# Patient Record
Sex: MALE | Race: WHITE | Employment: OTHER | URBAN - NONMETROPOLITAN AREA
[De-identification: names, ages, dates, MRNs, and addresses within clinical notes are randomized per-mention and may not be internally consistent; named-entity substitution may affect disease eponyms.]

---

## 2017-01-20 DIAGNOSIS — R07.89 ATYPICAL CHEST PAIN: Primary | ICD-10-CM

## 2017-01-20 NOTE — TELEPHONE ENCOUNTER
Prilosec      Last Written Prescription Date: 7/29/16  Last Fill Quantity: 30,  # refills: 10   Last Office Visit with [facility], [facility] or [facility] prescribing provider: 6/10/16

## 2017-01-23 RX ORDER — OMEPRAZOLE 40 MG/1
40 CAPSULE, DELAYED RELEASE ORAL DAILY
Qty: 30 CAPSULE | Refills: 3 | Status: SHIPPED | OUTPATIENT
Start: 2017-01-23 | End: 2018-07-17

## 2017-07-11 DIAGNOSIS — J30.2 SEASONAL ALLERGIC RHINITIS: ICD-10-CM

## 2017-07-12 RX ORDER — AZELASTINE 1 MG/ML
SPRAY, METERED NASAL
Qty: 30 ML | Refills: 0 | Status: SHIPPED | OUTPATIENT
Start: 2017-07-12 | End: 2017-08-24

## 2017-08-24 DIAGNOSIS — J30.2 SEASONAL ALLERGIC RHINITIS: ICD-10-CM

## 2017-08-24 RX ORDER — AZELASTINE 1 MG/ML
SPRAY, METERED NASAL
Qty: 30 ML | Refills: 0 | Status: SHIPPED | OUTPATIENT
Start: 2017-08-24 | End: 2017-12-12

## 2017-10-04 DIAGNOSIS — J30.2 SEASONAL ALLERGIC RHINITIS: ICD-10-CM

## 2017-10-04 NOTE — LETTER
[facility]  [address] [place] [address]              Atul Coker  [address] [facility] [address]      October 5, 2017       Dear Atul Coker,    APPOINTMENT REMINDER:       Our record indicates that it is time for you to be seen for an office visit with .  You may call our office at [phone] to schedule an appointment.  Please disregard this notice if you have already made an appointment.      Sincerely,    MD Claude

## 2017-10-04 NOTE — TELEPHONE ENCOUNTER
Astelin      Last Written Prescription Date: 8/24/17  Last Fill Quantity: 30ml,  # refills: 0   Last Office Visit with [facility], P or [facility] prescribing provider: 6/10/16

## 2017-10-05 RX ORDER — AZELASTINE 1 MG/ML
SPRAY, METERED NASAL
Qty: 30 ML | Refills: 0 | Status: SHIPPED | OUTPATIENT
Start: 2017-10-05 | End: 2018-02-02

## 2017-11-13 DIAGNOSIS — J30.2 SEASONAL ALLERGIC RHINITIS: ICD-10-CM

## 2017-11-13 DIAGNOSIS — J30.2 SEASONAL ALLERGIC RHINITIS, UNSPECIFIED CHRONICITY, UNSPECIFIED TRIGGER: Primary | ICD-10-CM

## 2017-11-14 NOTE — TELEPHONE ENCOUNTER
Astelin      Last Written Prescription Date: 10/5/17  Last Fill Quantity: 30ml,  # refills: 0   Last Office Visit with G, P or [facility] prescribing provider: 6/10/16

## 2017-11-16 RX ORDER — AZELASTINE 1 MG/ML
SPRAY, METERED NASAL
Qty: 30 ML | Refills: 0 | Status: SHIPPED | OUTPATIENT
Start: 2017-11-16 | End: 2018-02-02

## 2017-11-16 NOTE — TELEPHONE ENCOUNTER
Patient due for office visit.  Please see note below.  Medication pended.  Please advise.  Thank you.

## 2017-11-16 NOTE — TELEPHONE ENCOUNTER
Left message for the patient to return phone call back to clinic at earliest convenience.  Leti Bynum CMA([facility])

## 2017-11-22 NOTE — TELEPHONE ENCOUNTER
Left message for the patient to return phone call back to clinic at earliest convenience.  Leti Bynum CMA([facility])

## 2017-12-12 ENCOUNTER — OFFICE VISIT (OUTPATIENT)
Dept: FAMILY MEDICINE | Facility: OTHER | Age: 62
End: 2017-12-12
Attending: FAMILY MEDICINE
Payer: COMMERCIAL

## 2017-12-12 VITALS
HEIGHT: 69 IN | TEMPERATURE: 97 F | HEART RATE: 71 BPM | DIASTOLIC BLOOD PRESSURE: 68 MMHG | SYSTOLIC BLOOD PRESSURE: 132 MMHG | OXYGEN SATURATION: 97 % | BODY MASS INDEX: 28.82 KG/M2 | WEIGHT: 194.6 LBS | RESPIRATION RATE: 18 BRPM

## 2017-12-12 DIAGNOSIS — J30.1 CHRONIC SEASONAL ALLERGIC RHINITIS DUE TO POLLEN: Primary | ICD-10-CM

## 2017-12-12 PROCEDURE — 99213 OFFICE O/P EST LOW 20 MIN: CPT | Performed by: FAMILY MEDICINE

## 2017-12-12 RX ORDER — AZELASTINE 1 MG/ML
SPRAY, METERED NASAL
Qty: 3 BOTTLE | Refills: 3 | Status: SHIPPED | OUTPATIENT
Start: 2017-12-12 | End: 2018-05-17

## 2017-12-12 ASSESSMENT — PATIENT HEALTH QUESTIONNAIRE - PHQ9
5. POOR APPETITE OR OVEREATING: NOT AT ALL
SUM OF ALL RESPONSES TO PHQ QUESTIONS 1-9: 0

## 2017-12-12 ASSESSMENT — ANXIETY QUESTIONNAIRES
5. BEING SO RESTLESS THAT IT IS HARD TO SIT STILL: NOT AT ALL
3. WORRYING TOO MUCH ABOUT DIFFERENT THINGS: NOT AT ALL
1. FEELING NERVOUS, ANXIOUS, OR ON EDGE: NOT AT ALL
GAD7 TOTAL SCORE: 0
IF YOU CHECKED OFF ANY PROBLEMS ON THIS QUESTIONNAIRE, HOW DIFFICULT HAVE THESE PROBLEMS MADE IT FOR YOU TO DO YOUR WORK, TAKE CARE OF THINGS AT HOME, OR GET ALONG WITH OTHER PEOPLE: NOT DIFFICULT AT ALL
2. NOT BEING ABLE TO STOP OR CONTROL WORRYING: NOT AT ALL
7. FEELING AFRAID AS IF SOMETHING AWFUL MIGHT HAPPEN: NOT AT ALL
6. BECOMING EASILY ANNOYED OR IRRITABLE: NOT AT ALL

## 2017-12-12 ASSESSMENT — PAIN SCALES - GENERAL: PAINLEVEL: NO PAIN (0)

## 2017-12-12 NOTE — PROGRESS NOTES
SUBJECTIVE:  Atul Coker, 62 year old, male is seen with the following medical problems.    Allergic rhinitis.  Eliazar has history of allergic rhinitis and has been on azelastine as well as fexofenadine.  He has required occasional mucinex for congestions.  No new issues.      Denies chest pain, dyspnea, decreased exercise tolerance, dizzyness, nausea, vomiting, diaphoresis, palpitations, numbness, tingling, or paresthesias.    Current Outpatient Prescriptions   Medication Sig Dispense Refill     azelastine (ASTELIN) 0.1 % spray Spray 2 sprays into both nostrils 2 times daily 30 mL 0     azelastine (ASTELIN) 0.1 % spray Spray 2 sprays into both nostrils 2 times daily 30 mL 0     azelastine (ASTELIN) 0.1 % spray Spray 2 sprays into both nostrils 2 times daily 30 mL 0     omeprazole (PRILOSEC) 40 MG capsule Take 1 capsule (40 mg) by mouth daily Take 30-60 minutes before a meal. 30 capsule 3     order for DME Equipment being ordered: Orthotics 1 Device 0     order for DME Equipment being ordered: Left stirrup brace        DX:M25.572 LEFT ANKLE PAIN 1 Units 1     ASPIRIN PO Take 81 mg by mouth daily        Famotidine (PEPCID AC MAXIMUM STRENGTH PO) Take 20 mg by mouth daily       guaiFENesin (MUCINEX) 600 MG 12 hr tablet Take 600 mg by mouth daily       multivitamin, therapeutic with minerals (MULTI-VITAMIN) TABS Take 1 tablet by mouth daily       fexofenadine (ALLEGRA ALLERGY) 180 MG tablet Take 1 tablet by mouth daily.       Milk Thistle 175 MG CAPS Take by mouth daily          Allergies   Allergen Reactions     Seasonal Allergies      Penicillins Rash       Past Medical History:   Diagnosis Date     Allergic rhinitis, cause unspecified 3/5/2013    seasonal     CMV (cytomegalovirus infection) status positive (H)      Other abnormal glucose 3/5/2013    prediabetes     Other nonspecific abnormal serum enzyme levels 3/5/2013     Pure hypercholesterolemia 1/24/2012     Vocal cord nodules      Past Surgical History:  "  Procedure Laterality Date     COLONOSCOPY  08-    repeat in 10 yrs     HERNIA REPAIR, INGUINAL RT/LT  2013    Right side     UPPER GI ENDOSCOPY       VASECTOMY  2013     Family History   Problem Relation Age of Onset     C.A.D. Father      Myocardial Infarction Father      myocardial infarction     C.A.D. Mother      Other - See Comments Mother      colon polyps     Hypertension Mother      Other - See Comments Brother      end stage liver disease - cause of death     Hypertension Brother      Hypertension Sister      Social History     Social History     Marital status:      Spouse name: N/A     Number of children: N/A     Years of education: N/A     Occupational History     Teacher      full time     Social History Main Topics     Smoking status: Never Smoker     Smokeless tobacco: Never Used     Alcohol use Yes      Comment: occasionally     Drug use: No     Sexual activity: Not on file     Other Topics Concern     Caffeine Concern Yes     2 cups coffee daily     Parent/Sibling W/ Cabg, Mi Or Angioplasty Before 65f 55m? Yes     father     Social History Narrative         Review Of Systems  Constitutional, HEENT, cardiovascular, pulmonary, gi and gu systems are negative, except as otherwise noted.      OBJECTIVE:/68 (BP Location: Left arm, Patient Position: Sitting, Cuff Size: Adult Regular)  Pulse 71  Temp 97  F (36.1  C) (Tympanic)  Resp 18  Ht 5' 9\" (1.753 m)  Wt 194 lb 9.6 oz (88.3 kg)  SpO2 97%  BMI 28.74 kg/m2      Exam:  Physical Exam   Constitutional: He is oriented to person, place, and time. He appears well-developed and well-nourished. No distress.   Neurological: He is alert and oriented to person, place, and time.   Psychiatric: He has a normal mood and affect.     Other exam not repeated    Labs:  Orders Only on 07/26/2013   Component Date Value Ref Range Status     Post Vas Analysis 07/26/2013   NOSPRM Final                    Value:No Sperm Seen                          "  Testing performed on concentrated specimen.       ASSESSMENT/PLAN:  Chronic seasonal allergic rhinitis due to pollen  Continue same medications as outlined.  Follow up for complete physical.  - azelastine (ASTELIN) 0.1 % spray; Spray 2 sprays into both nostrils 2 times daily                Jatin Carbajal MD

## 2017-12-12 NOTE — MR AVS SNAPSHOT
After Visit Summary   12/12/2017    Atul Coker    MRN: [id number]           Patient Information     Date Of Birth          1955        Visit Information        Provider Department      12/12/2017 3:40 PM Jatin Carbajal MD [facility]3]        Today's Diagnoses     Chronic seasonal allergic rhinitis due to pollen    -  1      Care Instructions    Continue same medications.            Follow-ups after your visit        Follow-up notes from your care team     Return if symptoms worsen or fail to improve.      Who to contact     If you have questions or need follow up information about today's clinic visit or your schedule please contact [facility] directly at [phone].  Normal or non-critical lab and imaging results will be communicated to you by MyChart, letter or phone within 4 business days after the clinic has received the results. If you do not hear from us within 7 days, please contact the clinic through [employer] or phone. If you have a critical or abnormal lab result, we will notify you by phone as soon as possible.  Submit refill requests through [employer] or call your pharmacy and they will forward the refill request to us. Please allow 3 business days for your refill to be completed.          Additional Information About Your Visit        MyChart Information     [employer] gives you secure access to your electronic health record. If you see a primary care provider, you can also send messages to your care team and make appointments. If you have questions, please call your primary care clinic.  If you do not have a primary care provider, please call [phone] and they will assist you.        Care EveryWhere ID     This is your Care EveryWhere ID. This could be used by other organizations to access your [facility] medical records  [id number]        Your Vitals Were     Pulse Temperature Respirations Height Pulse Oximetry BMI (Body Mass Index)    71 97  F  "(36.1  C) (Tympanic) 18 5' 9\" (1.753 m) 97% 28.74 kg/m2       Blood Pressure from Last 3 Encounters:   12/12/17 132/68   06/10/16 103/70   10/05/15 132/74    Weight from Last 3 Encounters:   12/12/17 194 lb 9.6 oz (88.3 kg)   06/10/16 197 lb (89.4 kg)   10/05/15 192 lb (87.1 kg)              Today, you had the following     No orders found for display         Today's Medication Changes          These changes are accurate as of: 12/12/17 11:59 PM.  If you have any questions, ask your nurse or doctor.               These medicines have changed or have updated prescriptions.        Dose/Directions    * azelastine 0.1 % spray   Commonly known as:  ASTELIN   This may have changed:  Another medication with the same name was changed. Make sure you understand how and when to take each.   Used for:  Seasonal allergic rhinitis   Changed by:  Jatin Carbajal MD        Spray 2 sprays into both nostrils 2 times daily   Quantity:  30 mL   Refills:  0       * azelastine 0.1 % spray   Commonly known as:  ASTELIN   This may have changed:  Another medication with the same name was changed. Make sure you understand how and when to take each.   Used for:  Seasonal allergic rhinitis, unspecified chronicity, unspecified trigger   Changed by:  Jatin Carbajal MD        Spray 2 sprays into both nostrils 2 times daily   Quantity:  30 mL   Refills:  0       * azelastine 0.1 % spray   Commonly known as:  ASTELIN   This may have changed:  See the new instructions.   Used for:  Chronic seasonal allergic rhinitis due to pollen   Changed by:  Jatin Carbajal MD        Spray 2 sprays into both nostrils 2 times daily   Quantity:  3 Bottle   Refills:  3       * Notice:  This list has 3 medication(s) that are the same as other medications prescribed for you. Read the directions carefully, and ask your doctor or other care provider to review them with you.         Where to get your medicines      These medications were sent to [facility] - " [place], [place] - [address]  [address], [place] [address]     Phone:  [phone]     azelastine 0.1 % spray                Primary Care Provider Office Phone # Fax #    Jatin Carbajal [phone]       [facility] [place] [facility] [address] [place] [address]        Equal Access to Services     [facility] : Hadii aad ku hadasho Soomaali, waaxda luqadaha, qaybta kaalmada adeegyada, waxay idiin hayaan adeeg kharash la'aan ah. So [facility] [phone].    ATENCIÓN: Si habla espjustus, tiene a oscar disposición servicios gratuitos de asistencia lingüística. Isabel al [phone].    We comply with applicable federal civil rights laws and [place] laws. We do not discriminate on the basis of race, color, national origin, age, disability, sex, sexual orientation, or gender identity.            Thank you!     Thank you for choosing [facility]  for your care. Our goal is always to provide you with excellent care. Hearing back from our patients is one way we can continue to improve our services. Please take a few minutes to complete the written survey that you may receive in the mail after your visit with us. Thank you!             Your Updated Medication List - Protect others around you: Learn how to safely use, store and throw away your medicines at www.disposemymeds.org.          This list is accurate as of: 12/12/17 11:59 PM.  Always use your most recent med list.                   Brand Name Dispense Instructions for use Diagnosis    ALLEGRA ALLERGY 180 MG tablet   Generic drug:  fexofenadine      Take 1 tablet by mouth daily.        ASPIRIN PO      Take 81 mg by mouth daily        * azelastine 0.1 % spray    ASTELIN    30 mL    Spray 2 sprays into both nostrils 2 times daily    Seasonal allergic rhinitis       * azelastine 0.1 % spray    ASTELIN    30 mL    Spray 2 sprays into both nostrils 2 times daily    Seasonal allergic rhinitis, unspecified chronicity, unspecified trigger        * azelastine 0.1 % spray    ASTELIN    3 Bottle    Spray 2 sprays into both nostrils 2 times daily    Chronic seasonal allergic rhinitis due to pollen       guaiFENesin 600 MG 12 hr tablet    MUCINEX     Take 600 mg by mouth daily        Milk Thistle 175 MG Caps      Take by mouth daily        Multi-vitamin Tabs tablet      Take 1 tablet by mouth daily        omeprazole 40 MG capsule    priLOSEC    30 capsule    Take 1 capsule (40 mg) by mouth daily Take 30-60 minutes before a meal.    Atypical chest pain       * order for DME     1 Units    Equipment being ordered: Left stirrup brace    DX:M25.572 LEFT ANKLE PAIN    Left ankle pain       * order for DME     1 Device    Equipment being ordered: Orthotics    Chronic bilateral low back pain without sciatica       PEPCID AC MAXIMUM STRENGTH PO      Take 20 mg by mouth daily        * Notice:  This list has 5 medication(s) that are the same as other medications prescribed for you. Read the directions carefully, and ask your doctor or other care provider to review them with you.

## 2017-12-12 NOTE — NURSING NOTE
"Chief Complaint   Patient presents with     Recheck Medication       Initial /68 (BP Location: Left arm, Patient Position: Sitting, Cuff Size: Adult Regular)  Pulse 71  Temp 97  F (36.1  C) (Tympanic)  Resp 18  Ht 5' 9\" (1.753 m)  Wt 194 lb 9.6 oz (88.3 kg)  SpO2 97%  BMI 28.74 kg/m2 Estimated body mass index is 28.74 kg/(m^2) as calculated from the following:    Height as of this encounter: 5' 9\" (1.753 m).    Weight as of this encounter: 194 lb 9.6 oz (88.3 kg).  Medication Reconciliation: complete   Sarah Ramsey  "

## 2017-12-13 ASSESSMENT — ANXIETY QUESTIONNAIRES: GAD7 TOTAL SCORE: 0

## 2018-01-17 ENCOUNTER — TELEPHONE (OUTPATIENT)
Dept: FAMILY MEDICINE | Facility: OTHER | Age: 63
End: 2018-01-17

## 2018-01-17 DIAGNOSIS — Z12.5 SCREENING FOR PROSTATE CANCER: ICD-10-CM

## 2018-01-17 DIAGNOSIS — E78.2 MIXED HYPERLIPIDEMIA: Primary | ICD-10-CM

## 2018-01-17 NOTE — TELEPHONE ENCOUNTER
2:53 PM    Reason for Call: Phone Call    Description: Pt called and stated he was suppose to make an appt as a follow up from his Dec appt. Pt was told lab orders would be placed. Pt scheduled appt for 02/02/18 but I do not see labs in. Please place these and pt will come in on 02/01/18. Please call pt if there is any issues    Was an appointment offered for this call? Yes  If yes : Appointment type short              Date 02/02/18    Preferred method for responding to this message: Telephone Call  What is your phone number ?     If we cannot reach you directly, may we leave a detailed response at the number you provided? Yes    Can this message wait until your PCP/provider returns, if available today? Not applicable    Adrienne Sigala

## 2018-02-01 DIAGNOSIS — Z12.5 SCREENING FOR PROSTATE CANCER: ICD-10-CM

## 2018-02-01 DIAGNOSIS — E78.2 MIXED HYPERLIPIDEMIA: ICD-10-CM

## 2018-02-01 LAB
ALBUMIN SERPL-MCNC: 3.8 G/DL (ref 3.4–5)
ALP SERPL-CCNC: 126 U/L (ref 40–150)
ALT SERPL W P-5'-P-CCNC: 38 U/L (ref 0–70)
ANION GAP SERPL CALCULATED.3IONS-SCNC: 8 MMOL/L (ref 3–14)
AST SERPL W P-5'-P-CCNC: 22 U/L (ref 0–45)
BASOPHILS # BLD AUTO: 0.1 10E9/L (ref 0–0.2)
BASOPHILS NFR BLD AUTO: 0.8 %
BILIRUB SERPL-MCNC: 0.7 MG/DL (ref 0.2–1.3)
BUN SERPL-MCNC: 13 MG/DL (ref 7–30)
CALCIUM SERPL-MCNC: 8.7 MG/DL (ref 8.5–10.1)
CHLORIDE SERPL-SCNC: 109 MMOL/L (ref 94–109)
CHOLEST SERPL-MCNC: 253 MG/DL
CO2 SERPL-SCNC: 24 MMOL/L (ref 20–32)
CREAT SERPL-MCNC: 1.11 MG/DL (ref 0.66–1.25)
DIFFERENTIAL METHOD BLD: NORMAL
EOSINOPHIL # BLD AUTO: 0.3 10E9/L (ref 0–0.7)
EOSINOPHIL NFR BLD AUTO: 3.9 %
ERYTHROCYTE [DISTWIDTH] IN BLOOD BY AUTOMATED COUNT: 13.4 % (ref 10–15)
GFR SERPL CREATININE-BSD FRML MDRD: 67 ML/MIN/1.7M2
GLUCOSE SERPL-MCNC: 103 MG/DL (ref 70–99)
HCT VFR BLD AUTO: 46.2 % (ref 40–53)
HDLC SERPL-MCNC: 62 MG/DL
HGB BLD-MCNC: 16 G/DL (ref 13.3–17.7)
IMM GRANULOCYTES # BLD: 0 10E9/L (ref 0–0.4)
IMM GRANULOCYTES NFR BLD: 0.1 %
LDLC SERPL CALC-MCNC: 167 MG/DL
LYMPHOCYTES # BLD AUTO: 1.6 10E9/L (ref 0.8–5.3)
LYMPHOCYTES NFR BLD AUTO: 22.3 %
MCH RBC QN AUTO: 31.1 PG (ref 26.5–33)
MCHC RBC AUTO-ENTMCNC: 34.6 G/DL (ref 31.5–36.5)
MCV RBC AUTO: 90 FL (ref 78–100)
MONOCYTES # BLD AUTO: 0.7 10E9/L (ref 0–1.3)
MONOCYTES NFR BLD AUTO: 10.3 %
NEUTROPHILS # BLD AUTO: 4.5 10E9/L (ref 1.6–8.3)
NEUTROPHILS NFR BLD AUTO: 62.6 %
NONHDLC SERPL-MCNC: 191 MG/DL
NRBC # BLD AUTO: 0 10*3/UL
NRBC BLD AUTO-RTO: 0 /100
PLATELET # BLD AUTO: 161 10E9/L (ref 150–450)
POTASSIUM SERPL-SCNC: 4.1 MMOL/L (ref 3.4–5.3)
PROT SERPL-MCNC: 8 G/DL (ref 6.8–8.8)
PSA SERPL-ACNC: 1.47 UG/L (ref 0–4)
RBC # BLD AUTO: 5.14 10E12/L (ref 4.4–5.9)
SODIUM SERPL-SCNC: 141 MMOL/L (ref 133–144)
TRIGL SERPL-MCNC: 122 MG/DL
TSH SERPL DL<=0.005 MIU/L-ACNC: 1.35 MU/L (ref 0.4–4)
WBC # BLD AUTO: 7.2 10E9/L (ref 4–11)

## 2018-02-01 PROCEDURE — 80061 LIPID PANEL: CPT | Performed by: FAMILY MEDICINE

## 2018-02-01 PROCEDURE — G0103 PSA SCREENING: HCPCS | Performed by: FAMILY MEDICINE

## 2018-02-01 PROCEDURE — 80050 GENERAL HEALTH PANEL: CPT | Performed by: FAMILY MEDICINE

## 2018-02-01 PROCEDURE — 36415 COLL VENOUS BLD VENIPUNCTURE: CPT | Performed by: FAMILY MEDICINE

## 2018-02-02 ENCOUNTER — OFFICE VISIT (OUTPATIENT)
Dept: FAMILY MEDICINE | Facility: OTHER | Age: 63
End: 2018-02-02
Attending: FAMILY MEDICINE
Payer: COMMERCIAL

## 2018-02-02 VITALS
DIASTOLIC BLOOD PRESSURE: 62 MMHG | TEMPERATURE: 98.6 F | OXYGEN SATURATION: 99 % | HEART RATE: 62 BPM | HEIGHT: 69 IN | SYSTOLIC BLOOD PRESSURE: 132 MMHG | BODY MASS INDEX: 28.73 KG/M2 | WEIGHT: 194 LBS

## 2018-02-02 DIAGNOSIS — E78.5 DYSLIPIDEMIA: Primary | ICD-10-CM

## 2018-02-02 PROCEDURE — 99213 OFFICE O/P EST LOW 20 MIN: CPT | Performed by: FAMILY MEDICINE

## 2018-02-02 RX ORDER — SIMVASTATIN 20 MG
20 TABLET ORAL AT BEDTIME
Qty: 90 TABLET | Refills: 1 | Status: SHIPPED | OUTPATIENT
Start: 2018-02-02 | End: 2018-08-27

## 2018-02-02 ASSESSMENT — ANXIETY QUESTIONNAIRES
1. FEELING NERVOUS, ANXIOUS, OR ON EDGE: NOT AT ALL
7. FEELING AFRAID AS IF SOMETHING AWFUL MIGHT HAPPEN: NOT AT ALL
6. BECOMING EASILY ANNOYED OR IRRITABLE: NOT AT ALL
3. WORRYING TOO MUCH ABOUT DIFFERENT THINGS: NOT AT ALL
2. NOT BEING ABLE TO STOP OR CONTROL WORRYING: NOT AT ALL
GAD7 TOTAL SCORE: 0
5. BEING SO RESTLESS THAT IT IS HARD TO SIT STILL: NOT AT ALL
4. TROUBLE RELAXING: NOT AT ALL

## 2018-02-02 ASSESSMENT — PAIN SCALES - GENERAL: PAINLEVEL: NO PAIN (0)

## 2018-02-02 NOTE — MR AVS SNAPSHOT
After Visit Summary   2/2/2018    Atul Coker    MRN: [id number]           Patient Information     Date Of Birth          1955        Visit Information        Provider Department      2/2/2018 3:20 PM Jatin Carbajal MD [facility] [place]        Today's Diagnoses     Dyslipidemia    -  1      Care Instructions      Medicine for Cholesterol Control  Cholesterol is a waxy substance in your bloodstream. If there is too much of it in your blood, it can build up in the walls of your arteries. Over time, this buildup can lead to coronary disease. Coronary disease can put you at risk for a heart attack or stroke. It can also put you at risk for disease of the arteries in your legs and other places in your body. Medicine can give you the extra help you need to control your cholesterol.    How medicine helps  Different kinds of medicines help with cholesterol levels. Some help lower your LDL (bad cholesterol). Some help raise your HDL (good cholesterol). Other medicines lower your triglyceride levels. And some do all three. It may take some time to find the right medicine for you. Taking medicine will be only one part of your cholesterol control plan. You will still need to eat right and get regular exercise.  Talk with your healthcare provider to find out your risks for having a heart attack. Your provider can tell you what goals to use to see if your treatment is working. These goals may vary based on your health issues or family history. Also ask your provider how often your cholesterol should be checked as part of your treatment plan. You may need to fast before getting your cholesterol checked.  Taking your medicine  It is important to:    Tell your healthcare provider about any other medicines you take. This includes over-the-counter medicines. It also includes vitamins and herbs.    Take your medicine exactly as directed. This helps make sure that it works as it should.    Don't skip a  dose.    Don't stop taking it if you feel better.    Don't stop taking it when your cholesterol numbers improve.    Order your refill before your medicine runs out.  Side effects  Medicines can cause side effects. These often occur at the start of taking a new medicine. Side effects can include headache and upset stomach. Rarely you can have muscle aches. Tell your healthcare provider about any side effects you have.  When to call your healthcare provider  When taking your medicine, let your healthcare provider know if you have:    Yellowing of the whites of eyes    Blurred vision    Muscle aches    Trouble breathing   High-risk groups  Some people may need to take medicines called statins to control their cholesterol. This is in addition to eating a healthy diet and getting regular exercise.  Statins can help you stay healthy. They can also help prevent a heart attack or stroke. You may need to take a statin if you are in one of these groups:    Adults who have had a heart attack or stroke. Or adults who have had peripheral vascular disease, a ministroke (transient ischemic attack), or stable or unstable angina. This group also includes people who have had a procedure to restore blood flow through a blocked artery. These procedures include percutaneous coronary intervention, angioplasty, stent, and open-heart bypass surgery.    Adults who have diabetes. Or adults who are at higher risk of having a heart attack or stroke and have an LDL cholesterol level of 70 to 189 mg/dL    Adults who are 21 years old or older and have an LDL cholesterol level of 190 mg/dL or higher.  If you are in a high-risk group, talk with your healthcare provider about your treatment goals. Make sure you understand why these goals are important, based on your own health history and your family history of heart disease or high cholesterol.  Make a plan to have regular cholesterol checks. You may need to fast before getting this test. Also ask  "your provider about any side effects your medicines may cause. Let your provider know about any side effects you have. You may need to take more than one medicine to reach the cholesterol goals that you and your provider decide on.  Date Last Reviewed: 10/1/2016    7092-3785 [employer]. [address] [place] [address]. All rights reserved. This information is not intended as a substitute for professional medical care. Always follow your healthcare professional's instructions.        Controlling Your Cholesterol  Cholesterol is a waxy substance. It travels in your blood through the blood vessels. When you have high cholesterol, it can build up along the walls of the blood vessels. This makes the vessels narrower and decreases blood flow. You are then at greater risk for having a heart attack or a stroke.  Good and bad cholesterol  Lipids are fats and blood is mostly water. Fat and water don't mix. So our bodies need lipoproteins (lipids inside a protein shell) to carry the lipids. The protein shell carries its lipids through the bloodstream. There are two main kinds of lipoproteins:    LDL (low-density lipoprotein) is known as \"bad cholesterol.\" It mainly carries cholesterol. It delivers this cholesterol to body cells. Excess LDL cholesterol will build up in artery walls. This increases your risk for heart disease and stroke.    HDL (high-density lipoprotein) is known as \"good cholesterol.\" This protein shell collects excess cholesterol that LDLs have left behind on blood vessel walls. That's why high levels of HDL cholesterol can decrease your risk of heart disease and stroke.  Controlling cholesterol levels  Total cholesterol includes LDL and HDL cholesterol, as well as other fats in the bloodstream. If your total cholesterol is high, follow the steps below to help lower your total cholesterol level:    Eat less unhealthy fat:    Cut back on saturated fats and trans fats (also called " hydrogenated) by selecting lean cuts of meat, low-fat dairy, and using oils instead of solid fats. Limit baked goods, processed meats, and fried foods. A diet that s high in these fats increases your bad cholesterol. It's not enough to just cut back on foods containing cholesterol.    Eat about 2 servings of fish per week. Most fish contain omega-3 fatty acids. These help lower blood cholesterol.    Eat more whole grains and soluble fiber (such as oat bran). These lower overall cholesterol.    Be active:    Choose an activity you enjoy. Walking, swimming, and riding a bike are some good ways to be active.    Start at a level where you feel comfortable. Increase your time and pace a little each week.    Work up to 30 to 40 minutes of moderate to high intensity physical activity at least 3 to 4 days per week.    Remember, some activity is better than none.    If you haven't been exercising regularly, start slowly. Check with your healthcare provider to make sure the exercise plan is right for you.    Quit smoking. Quitting smoking can improve your lipid levels. It also lowers your risk for heart disease and stroke.    Weight management. If you are overweight or obese, your healthcare provider will work with you to lose weight and lower your BMI (body mass index) to a normal or near-normal level. Making diet changes and increasing physical activity can help.    Take medicine as directed. Many people need medicine to get their LDL levels to a safe level. Medicine to lower cholesterol levels is effective and safe. Taking medicine is not a substitute for exercise or watching your diet! Your healthcare provider can tell you whether you might benefit from a cholesterol-lowering medicine.  Date Last Reviewed: 6/1/2017 2000-2017 [employer]. [address] [place] [address], [place] [address]. All rights reserved. This information is not intended as a substitute for professional medical care. Always follow your  healthcare professional's instructions.        Lifestyle Changes to Control Cholesterol  You can control your cholesterol through diet, exercise, weight management, quitting smoking, stress management, and taking your medicines right. These things can also lower your risk for cardiovascular disease.    Eating healthy  Your healthcare provider will give you information on diet changes you may need to make. Your provider may recommend that you see a registered dietitian for help with diet changes. Changes may include:    Cutting back on the amount of fat and cholesterol in your meals    Eating less salt (sodium). This is especially important if you have high blood pressure.    Eating more fresh vegetables and fruits    Eating lean proteins such as fish, poultry, beans, and peas    Eating less red meat and processed meats    Using low-fat dairy products    Using vegetable and nut oils in limited amounts    Limiting how many sweets and processed foods like chips, cookies, and baked goods that you eat     Limiting how many sugar-sweetened beverages you drink    Limiting how often you eat out  Getting exercise  Regular exercise is a good way to help your body control cholesterol. Regular exercise can help in many ways. It can:    Raise your good cholesterol    Help lower your bad cholesterol    Let blood flow better through your body    Give more oxygen to your muscles and tissues    Help you manage your weight    Help your heart pump better    Lower your blood pressure  Your healthcare provider may recommend that you get more physical activity if you haven't been active. Your provider may recommend that you get moderate to vigorous physical activity for at least 40 minutes each day. You should do this for at least 3 to 4 days each week. A few examples of moderate to vigorous activity are:    Walking at a brisk pace. This is about 3 to 4 miles per hour.    Jogging or running    Swimming or water  aerobics    Hiking    Dancing    Martial arts    Tennis    Riding a bicycle or stationary bike    Dancing  Managing your weight  If you are overweight or obese, your healthcare provider will work with you to help you lose weight and lower your BMI (body mass index). Making diet changes and getting more physical activity can help. Changing your diet will help you lose weight more easily than adding exercise.  Quitting smoking  Smoking and other tobacco use can raise cholesterol and make it harder to control. Quitting is tough. But millions of people have given up tobacco for good. You can quit, too! Think about some of the reasons below to quit smoking. Do any of them make you think twice about your smoking habit?  Stop smoking because it:    Keeps your cholesterol high, even if you make all the other changes you re supposed to    Damages your body. It especially harms your heart, lungs, skin, and blood vessels.    Makes you more likely to have a heart attack (acute myocardial infarction), stroke, or cancer    Stains your teeth    Makes your skin, clothes, and breath smell bad    Costs a lot of money  Controlling stress   Learn ways to control stress. This will help you deal with stress in your home and work life. Controlling stress can greatly lower your risk of getting cardiovascular disease.  Making the most of medicines  Healthy eating and exercise are a good start to keeping your cholesterol down. But you may need some extra help from medicine. If your doctor prescribes medicine, be sure to take it exactly as directed. Remember:    Tell your healthcare provider about all other medicines you take. This includes vitamins and herbs.    Tell your healthcare provider if you have any side effects after starting to take a medicine. Examples of side effects to watch for include muscle aches, weakness, blurred vision, rust-colored urine, yellowing of eyes or skin (jaundice), and headache.    Don t skip a dose or stop  taking your medicine because you feel better or because your cholesterol numbers go down. Never stop taking your medicine unless your healthcare provider has told you it s OK.    Ask your healthcare provider if you have any questions about your medicines.  High risk groups  Some people may need to take medicines called statins to control their cholesterol. This is in addition to eating a healthy diet and getting regular exercise.  Statins can help you stay healthy. They can also help prevent a heart attack or stroke. You may need to take a statin if you are in one of these groups:    Adults who have had a heart attack or stroke. Or adults who have had peripheral vascular disease, a ministroke (transient ischemic attack), or stable or unstable angina. This group also includes people who have had a procedure to restore blood flow through a blocked artery. These procedures include percutaneous coronary intervention, angioplasty, stent, and open-heart bypass surgery.    Adults who have diabetes. Or adults who are at higher risk of having a heart attack or stroke and have an LDL cholesterol level of 70 to 189 mg/dL    Adults who are 21 years old or older and have an LDL cholesterol level of 190 mg/dL or higher.  If you are in a high-risk group, talk with your healthcare provider about your treatment goals. Make sure you understand why these goals are important, based on your own health history and your family history of heart disease or high cholesterol.  Make a plan to have regular cholesterol checks. You may need to fast before getting this test. Also ask your provider about any side effects your medicines may cause. Let your provider know about any side effects you have. You may need to take more than one medicine to reach the cholesterol goals that you and your provider decide on.  Date Last Reviewed: 10/1/2016    6428-2045 [employer]. [address] [place] [address], [place] [address]. All rights reserved.  This information is not intended as a substitute for professional medical care. Always follow your healthcare professional's instructions.        Understanding Food and Cholesterol  Having a high cholesterol level puts you at risk for heart disease and other health problems. What you eat has a big effect on your body s cholesterol level. Eating certain foods can raise your cholesterol. Other foods can help you lower it. Watching what you eat can help you get your cholesterol level under control.  Know which foods are high in saturated fat and trans fat  Foods high in saturated fat and trans fat can raise your LDL (bad) cholesterol. It s important to knowing which foods are high in these fats, and eat less of them. This can help you manage your cholesterol levels.  Foods high in these fats are:    Animal products, including beef, lamb, pork, and poultry with skin on    Cold cuts, hankins, sausage    Creamy sauces and fatty gravies    Cookies, donuts, muffins, and pastries    Fried foods    Shortening, butter, coconut oil, palm oil, cocoa butter, partially hydrogenated oils (read labels)    High-fat dairy products such as whole milk, cheese, cream cheese, and ice cream  Better choices:    Lean beef, skinless white-meat poultry, fish    Tomato sauce, vegetable puree    Dried fruit, bagels, bread with jam    Baked, broiled, steamed, or roasted foods    Soft (tub) margarine, canola oil, and olive oil in moderate amounts    Low-fat or nonfat dairy products, such as 1% or fat-free milk, and reduced-fat cheese  Use fiber to help control cholesterol  Foods high in fiber can help you keep your cholesterol down. Good sources of fiber are:    Oats    Barley    Whole grains    Beans    Vegetables    Cornmeal    Popcorn    Berries, apples, other fruits  Date Last Reviewed: 8/10/2015    2927-4700 [employer].. [address] [place] [address], [place] [address]. All rights reserved. This information is not intended as a  "substitute for professional medical care. Always follow your healthcare professional's instructions.                Follow-ups after your visit        Follow-up notes from your care team     Return in about 3 months (around 5/2/2018) for Lab testing, Follow Up Visit.      Who to contact     If you have questions or need follow up information about today's clinic visit or your schedule please contact [facility] directly at [phone].  Normal or non-critical lab and imaging results will be communicated to you by [employer], letter or phone within 4 business days after the clinic has received the results. If you do not hear from us within 7 days, please contact the clinic through [employer] or phone. If you have a critical or abnormal lab result, we will notify you by phone as soon as possible.  Submit refill requests through [employer] or call your pharmacy and they will forward the refill request to us. Please allow 3 business days for your refill to be completed.          Additional Information About Your Visit        [employer] Information     [employer] gives you secure access to your electronic health record. If you see a primary care provider, you can also send messages to your care team and make appointments. If you have questions, please call your primary care clinic.  If you do not have a primary care provider, please call [phone] and they will assist you.        Care EveryWhere ID     This is your Care EveryWhere ID. This could be used by other organizations to access your [place] medical records  [id number]        Your Vitals Were     Pulse Temperature Height Pulse Oximetry BMI (Body Mass Index)       62 98.6  F (37  C) (Tympanic) 5' 9\" (1.753 m) 99% 28.65 kg/m2        Blood Pressure from Last 3 Encounters:   02/02/18 132/62   12/12/17 132/68   06/10/16 103/70    Weight from Last 3 Encounters:   02/02/18 194 lb (88 kg)   12/12/17 194 lb 9.6 oz (88.3 kg)   06/10/16 197 lb (89.4 kg)            "   Today, you had the following     No orders found for display         Today's Medication Changes          These changes are accurate as of 2/2/18 11:59 PM.  If you have any questions, ask your nurse or doctor.               Start taking these medicines.        Dose/Directions    simvastatin 20 MG tablet   Commonly known as:  ZOCOR   Used for:  Dyslipidemia   Started by:  Jatin Carbajal MD        Dose:  20 mg   Take 1 tablet (20 mg) by mouth At Bedtime   Quantity:  90 tablet   Refills:  1         These medicines have changed or have updated prescriptions.        Dose/Directions    azelastine 0.1 % spray   Commonly known as:  ASTELIN   This may have changed:  Another medication with the same name was removed. Continue taking this medication, and follow the directions you see here.   Used for:  Chronic seasonal allergic rhinitis due to pollen   Changed by:  Jatin Carbajal MD        Spray 2 sprays into both nostrils 2 times daily   Quantity:  3 Bottle   Refills:  3         Stop taking these medicines if you haven't already. Please contact your care team if you have questions.     Milk Thistle 175 MG Caps   Stopped by:  Jatin Carbajal MD                Where to get your medicines      These medications were sent to [facility] [id number]     Phone:  [phone]     simvastatin 20 MG tablet                Primary Care Provider Office Phone # Fax #    Jatin Carbajal [phone]       [address] [place] [address]        Equal Access to Services     [facility]: Hadii john nice Soblayne, waaxda luqadaha, qaybta kaalmada jose fernandez . So [facility] [phone].    ATENCIÓN: Si habla español, tiene a oscar disposición servicios gratuitos de asistencia lingüística. Karlame al [phone].    We comply with applicable federal civil rights laws and [place] laws. We do not discriminate on  the basis of race, color, national origin, age, disability, sex, sexual orientation, or gender identity.            Thank you!     [facility]  [facility]. Our goal is always to provide you with excellent care. Hearing back from our patients is one way we can continue to improve our services. Please take a few minutes to complete the written survey that you may receive in the mail after your visit with us. Thank you!             Your Updated Medication List - Protect others around you: Learn how to safely use, store and throw away your medicines at www.disposemymeds.org.          This list is accurate as of 2/2/18 11:59 PM.  Always use your most recent med list.                   Brand Name Dispense Instructions for use Diagnosis    ALLEGRA ALLERGY 180 MG tablet   Generic drug:  fexofenadine      Take 1 tablet by mouth daily.        ASPIRIN PO      Take 81 mg by mouth daily        azelastine 0.1 % spray    ASTELIN    3 Bottle    Spray 2 sprays into both nostrils 2 times daily    Chronic seasonal allergic rhinitis due to pollen       guaiFENesin 600 MG 12 hr tablet    MUCINEX     Take 600 mg by mouth daily        Multi-vitamin Tabs tablet      Take 1 tablet by mouth daily        omeprazole 40 MG capsule    priLOSEC    30 capsule    Take 1 capsule (40 mg) by mouth daily Take 30-60 minutes before a meal.    Atypical chest pain       * order for DME     1 Units    Equipment being ordered: Left stirrup brace    DX:M25.572 LEFT ANKLE PAIN    Left ankle pain       * order for DME     1 Device    Equipment being ordered: Orthotics    Chronic bilateral low back pain without sciatica       PEPCID AC MAXIMUM STRENGTH PO      Take 20 mg by mouth daily        simvastatin 20 MG tablet    ZOCOR    90 tablet    Take 1 tablet (20 mg) by mouth At Bedtime    Dyslipidemia       * Notice:  This list has 2 medication(s) that are the same as other medications prescribed for you. Read the directions  carefully, and ask your doctor or other care provider to review them with you.

## 2018-02-02 NOTE — PROGRESS NOTES
SUBJECTIVE:  Atul Coker, 63 year old, male is seen with the following medical problems.    Dyslipidemia.  Eliazar was found to have a significantly elevated total cholesterol and LDL value.  He has a strong family history of coronary heart disease. Risk is calculated as follows:    The 10-year ASCVD risk score (Delavaneb BURRELL Jr, et al., 2013) is: 11.2%    Values used to calculate the score:      Age: 63 years      Sex: Male      Is Non- : No      Diabetic: No      Tobacco smoker: No      Systolic Blood Pressure: 132 mmHg      Is BP treated: No      HDL Cholesterol: 62 mg/dL      Total Cholesterol: 253 mg/dL     Denies chest pain, dyspnea, decreased exercise tolerance, dizzyness, nausea, vomiting, diaphoresis, palpitations, numbness, tingling, or paresthesias.            Current Outpatient Prescriptions   Medication Sig Dispense Refill     azelastine (ASTELIN) 0.1 % spray Spray 2 sprays into both nostrils 2 times daily 3 Bottle 3     omeprazole (PRILOSEC) 40 MG capsule Take 1 capsule (40 mg) by mouth daily Take 30-60 minutes before a meal. 30 capsule 3     order for DME Equipment being ordered: Orthotics 1 Device 0     order for DME Equipment being ordered: Left stirrup brace        DX:M25.572 LEFT ANKLE PAIN 1 Units 1     ASPIRIN PO Take 81 mg by mouth daily        Famotidine (PEPCID AC MAXIMUM STRENGTH PO) Take 20 mg by mouth daily       guaiFENesin (MUCINEX) 600 MG 12 hr tablet Take 600 mg by mouth daily       multivitamin, therapeutic with minerals (MULTI-VITAMIN) TABS Take 1 tablet by mouth daily       fexofenadine (ALLEGRA ALLERGY) 180 MG tablet Take 1 tablet by mouth daily.          Allergies   Allergen Reactions     Seasonal Allergies      Penicillins Rash       Past Medical History:   Diagnosis Date     Allergic rhinitis, cause unspecified 3/5/2013    seasonal     CMV (cytomegalovirus infection) status positive (H)      Other abnormal glucose 3/5/2013    prediabetes     Other  "nonspecific abnormal serum enzyme levels 3/5/2013     Pure hypercholesterolemia 1/24/2012     Vocal cord nodules      Past Surgical History:   Procedure Laterality Date     COLONOSCOPY  08-    repeat in 10 yrs     HERNIA REPAIR, INGUINAL RT/LT  2013    Right side     UPPER GI ENDOSCOPY       VASECTOMY  2013     Family History   Problem Relation Age of Onset     C.A.D. Father      Myocardial Infarction Father      myocardial infarction     C.A.D. Mother      Other - See Comments Mother      colon polyps     Hypertension Mother      Other - See Comments Brother      end stage liver disease - cause of death     Hypertension Brother      Hypertension Sister      Social History     Social History     Marital status:      Spouse name: N/A     Number of children: N/A     Years of education: N/A     Occupational History     Teacher      full time     Social History Main Topics     Smoking status: Never Smoker     Smokeless tobacco: Never Used     Alcohol use Yes      Comment: occasionally     Drug use: No     Sexual activity: Not on file     Other Topics Concern     Caffeine Concern Yes     2 cups coffee daily     Parent/Sibling W/ Cabg, Mi Or Angioplasty Before 65f 55m? Yes     father     Social History Narrative         Review Of Systems  Constitutional, HEENT, cardiovascular, pulmonary, gi and gu systems are negative, except as otherwise noted.    OBJECTIVE:  /62 (BP Location: Right arm, Patient Position: Sitting, Cuff Size: Adult Regular)  Pulse 62  Temp 98.6  F (37  C) (Tympanic)  Ht 5' 9\" (1.753 m)  Wt 194 lb (88 kg)  SpO2 99%  BMI 28.65 kg/m2      Exam:  Physical Exam   Constitutional: He is oriented to person, place, and time. He appears well-developed and well-nourished. No distress.   Neurological: He is alert and oriented to person, place, and time.   Psychiatric: He has a normal mood and affect.     Other exam not repeated    Labs:  Orders Only on 02/01/2018   Component Date Value Ref " Range Status     WBC 02/01/2018 7.2  4.0 - 11.0 10e9/L Final     RBC Count 02/01/2018 5.14  4.4 - 5.9 10e12/L Final     Hemoglobin 02/01/2018 16.0  13.3 - 17.7 g/dL Final     Hematocrit 02/01/2018 46.2  40.0 - 53.0 % Final     MCV 02/01/2018 90  78 - 100 fl Final     MCH 02/01/2018 31.1  26.5 - 33.0 pg Final     MCHC 02/01/2018 34.6  31.5 - 36.5 g/dL Final     RDW 02/01/2018 13.4  10.0 - 15.0 % Final     Platelet Count 02/01/2018 161  150 - 450 10e9/L Final     Diff Method 02/01/2018 Automated Method   Final     % Neutrophils 02/01/2018 62.6  % Final     % Lymphocytes 02/01/2018 22.3  % Final     % Monocytes 02/01/2018 10.3  % Final     % Eosinophils 02/01/2018 3.9  % Final     % Basophils 02/01/2018 0.8  % Final     % Immature Granulocytes 02/01/2018 0.1  % Final     Nucleated RBCs 02/01/2018 0  0 /100 Final     Absolute Neutrophil 02/01/2018 4.5  1.6 - 8.3 10e9/L Final     Absolute Lymphocytes 02/01/2018 1.6  0.8 - 5.3 10e9/L Final     Absolute Monocytes 02/01/2018 0.7  0.0 - 1.3 10e9/L Final     Absolute Eosinophils 02/01/2018 0.3  0.0 - 0.7 10e9/L Final     Absolute Basophils 02/01/2018 0.1  0.0 - 0.2 10e9/L Final     Abs Immature Granulocytes 02/01/2018 0.0  0 - 0.4 10e9/L Final     Absolute Nucleated RBC 02/01/2018 0.0   Final     PSA 02/01/2018 1.47  0 - 4 ug/L Final    Assay Method:  Chemiluminescence using Siemens Vista analyzer     Cholesterol 02/01/2018 253* <200 mg/dL Final    Desirable:       <200 mg/dl     Triglycerides 02/01/2018 122  <150 mg/dL Final    Fasting specimen     HDL Cholesterol 02/01/2018 62  >39 mg/dL Final     LDL Cholesterol Calculated 02/01/2018 167* <100 mg/dL Final    Comment: Above desirable:  100-129 mg/dl  Borderline High:  130-159 mg/dL  High:             160-189 mg/dL  Very high:       >189 mg/dl       Non HDL Cholesterol 02/01/2018 191* <130 mg/dL Final    Comment: Above Desirable:  130-159 mg/dl  Borderline high:  160-189 mg/dl  High:             190-219 mg/dl  Very high:        >219 mg/dl       Sodium 02/01/2018 141  133 - 144 mmol/L Final     Potassium 02/01/2018 4.1  3.4 - 5.3 mmol/L Final     Chloride 02/01/2018 109  94 - 109 mmol/L Final     Carbon Dioxide 02/01/2018 24  20 - 32 mmol/L Final     Anion Gap 02/01/2018 8  3 - 14 mmol/L Final     Glucose 02/01/2018 103* 70 - 99 mg/dL Final    Fasting specimen     Urea Nitrogen 02/01/2018 13  7 - 30 mg/dL Final     Creatinine 02/01/2018 1.11  0.66 - 1.25 mg/dL Final     GFR Estimate 02/01/2018 67  >60 mL/min/1.7m2 Final    Non  GFR Calc     GFR Estimate If Black 02/01/2018 81  >60 mL/min/1.7m2 Final    African American GFR Calc     Calcium 02/01/2018 8.7  8.5 - 10.1 mg/dL Final     Bilirubin Total 02/01/2018 0.7  0.2 - 1.3 mg/dL Final     Albumin 02/01/2018 3.8  3.4 - 5.0 g/dL Final     Protein Total 02/01/2018 8.0  6.8 - 8.8 g/dL Final     Alkaline Phosphatase 02/01/2018 126  40 - 150 U/L Final     ALT 02/01/2018 38  0 - 70 U/L Final     AST 02/01/2018 22  0 - 45 U/L Final     TSH 02/01/2018 1.35  0.40 - 4.00 mU/L Final       ASSESSMENT/PLAN:  Dyslipidemia  Begin simvastatin (Zocor) once daily.  Diet and exercise. Follow up 3 months.  - simvastatin (ZOCOR) 20 MG tablet; Take 1 tablet (20 mg) by mouth At Bedtime            Jatin Carbajal MD

## 2018-02-02 NOTE — NURSING NOTE
"Chief Complaint   Patient presents with     RECHECK     consult of labs he had drawn yesterday.     Derm Problem     on the shin and on the arms.  Very itchy.  Just wants to know if it is dry skin or not.     Ear Problem     states when he swallows that both ears click       Initial /62 (BP Location: Right arm, Patient Position: Sitting, Cuff Size: Adult Regular)  Pulse 62  Temp 98.6  F (37  C) (Tympanic)  Ht 5' 9\" (1.753 m)  Wt 194 lb (88 kg)  SpO2 99%  BMI 28.65 kg/m2 Estimated body mass index is 28.65 kg/(m^2) as calculated from the following:    Height as of this encounter: 5' 9\" (1.753 m).    Weight as of this encounter: 194 lb (88 kg).  Medication Reconciliation: complete     Caitlyn Miller      "

## 2018-02-03 ASSESSMENT — ANXIETY QUESTIONNAIRES: GAD7 TOTAL SCORE: 0

## 2018-02-03 ASSESSMENT — PATIENT HEALTH QUESTIONNAIRE - PHQ9: SUM OF ALL RESPONSES TO PHQ QUESTIONS 1-9: 0

## 2018-02-09 ENCOUNTER — TELEPHONE (OUTPATIENT)
Dept: FAMILY MEDICINE | Facility: OTHER | Age: 63
End: 2018-02-09

## 2018-02-09 NOTE — TELEPHONE ENCOUNTER
Left message to hold medication and see if the pain goes away and follow up with Dr. Carbajal next week. If any questions please call.

## 2018-02-09 NOTE — TELEPHONE ENCOUNTER
8:48 AM    Reason for Call: Phone Call    Description: Atul was seen by Solomon Martinez and was put on a cholesterol medication and is now having stomach issues. Could this be from the medication ? Please call Atul toharrisonvise    Was an appointment offered for this call?  No    Preferred method for responding to this message: [phone]    If we cannot reach you directly, may we leave a detailed response at the number you provided?   Yes    Can this message wait until your PCP/provider returns, if available today?   BRAYAN Tellez

## 2018-02-14 NOTE — PATIENT INSTRUCTIONS
Medicine for Cholesterol Control  Cholesterol is a waxy substance in your bloodstream. If there is too much of it in your blood, it can build up in the walls of your arteries. Over time, this buildup can lead to coronary disease. Coronary disease can put you at risk for a heart attack or stroke. It can also put you at risk for disease of the arteries in your legs and other places in your body. Medicine can give you the extra help you need to control your cholesterol.    How medicine helps  Different kinds of medicines help with cholesterol levels. Some help lower your LDL (bad cholesterol). Some help raise your HDL (good cholesterol). Other medicines lower your triglyceride levels. And some do all three. It may take some time to find the right medicine for you. Taking medicine will be only one part of your cholesterol control plan. You will still need to eat right and get regular exercise.  Talk with your healthcare provider to find out your risks for having a heart attack. Your provider can tell you what goals to use to see if your treatment is working. These goals may vary based on your health issues or family history. Also ask your provider how often your cholesterol should be checked as part of your treatment plan. You may need to fast before getting your cholesterol checked.  Taking your medicine  It is important to:    Tell your healthcare provider about any other medicines you take. This includes over-the-counter medicines. It also includes vitamins and herbs.    Take your medicine exactly as directed. This helps make sure that it works as it should.    Don't skip a dose.    Don't stop taking it if you feel better.    Don't stop taking it when your cholesterol numbers improve.    Order your refill before your medicine runs out.  Side effects  Medicines can cause side effects. These often occur at the start of taking a new medicine. Side effects can include headache and upset stomach. Rarely you can have  muscle aches. Tell your healthcare provider about any side effects you have.  When to call your healthcare provider  When taking your medicine, let your healthcare provider know if you have:    Yellowing of the whites of eyes    Blurred vision    Muscle aches    Trouble breathing   High-risk groups  Some people may need to take medicines called statins to control their cholesterol. This is in addition to eating a healthy diet and getting regular exercise.  Statins can help you stay healthy. They can also help prevent a heart attack or stroke. You may need to take a statin if you are in one of these groups:    Adults who have had a heart attack or stroke. Or adults who have had peripheral vascular disease, a ministroke (transient ischemic attack), or stable or unstable angina. This group also includes people who have had a procedure to restore blood flow through a blocked artery. These procedures include percutaneous coronary intervention, angioplasty, stent, and open-heart bypass surgery.    Adults who have diabetes. Or adults who are at higher risk of having a heart attack or stroke and have an LDL cholesterol level of 70 to 189 mg/dL    Adults who are 21 years old or older and have an LDL cholesterol level of 190 mg/dL or higher.  If you are in a high-risk group, talk with your healthcare provider about your treatment goals. Make sure you understand why these goals are important, based on your own health history and your family history of heart disease or high cholesterol.  Make a plan to have regular cholesterol checks. You may need to fast before getting this test. Also ask your provider about any side effects your medicines may cause. Let your provider know about any side effects you have. You may need to take more than one medicine to reach the cholesterol goals that you and your provider decide on.  Date Last Reviewed: 10/1/2016    9187-5638 [employer]. [address] [facility], [place], [place]  "[address]. All rights reserved. This information is not intended as a substitute for professional medical care. Always follow your healthcare professional's instructions.        Controlling Your Cholesterol  Cholesterol is a waxy substance. It travels in your blood through the blood vessels. When you have high cholesterol, it can build up along the walls of the blood vessels. This makes the vessels narrower and decreases blood flow. You are then at greater risk for having a heart attack or a stroke.  Good and bad cholesterol  Lipids are fats and blood is mostly water. Fat and water don't mix. So our bodies need lipoproteins (lipids inside a protein shell) to carry the lipids. The protein shell carries its lipids through the bloodstream. There are two main kinds of lipoproteins:    LDL (low-density lipoprotein) is known as \"bad cholesterol.\" It mainly carries cholesterol. It delivers this cholesterol to body cells. Excess LDL cholesterol will build up in artery walls. This increases your risk for heart disease and stroke.    HDL (high-density lipoprotein) is known as \"good cholesterol.\" This protein shell collects excess cholesterol that LDLs have left behind on blood vessel walls. That's why high levels of HDL cholesterol can decrease your risk of heart disease and stroke.  Controlling cholesterol levels  Total cholesterol includes LDL and HDL cholesterol, as well as other fats in the bloodstream. If your total cholesterol is high, follow the steps below to help lower your total cholesterol level:    Eat less unhealthy fat:    Cut back on saturated fats and trans fats (also called hydrogenated) by selecting lean cuts of meat, low-fat dairy, and using oils instead of solid fats. Limit baked goods, processed meats, and fried foods. A diet that s high in these fats increases your bad cholesterol. It's not enough to just cut back on foods containing cholesterol.    Eat about 2 servings of fish per week. Most fish contain " omega-3 fatty acids. These help lower blood cholesterol.    Eat more whole grains and soluble fiber (such as oat bran). These lower overall cholesterol.    Be active:    Choose an activity you enjoy. Walking, swimming, and riding a bike are some good ways to be active.    Start at a level where you feel comfortable. Increase your time and pace a little each week.    Work up to 30 to 40 minutes of moderate to high intensity physical activity at least 3 to 4 days per week.    Remember, some activity is better than none.    If you haven't been exercising regularly, start slowly. Check with your healthcare provider to make sure the exercise plan is right for you.    Quit smoking. Quitting smoking can improve your lipid levels. It also lowers your risk for heart disease and stroke.    Weight management. If you are overweight or obese, your healthcare provider will work with you to lose weight and lower your BMI (body mass index) to a normal or near-normal level. Making diet changes and increasing physical activity can help.    Take medicine as directed. Many people need medicine to get their LDL levels to a safe level. Medicine to lower cholesterol levels is effective and safe. Taking medicine is not a substitute for exercise or watching your diet! Your healthcare provider can tell you whether you might benefit from a cholesterol-lowering medicine.  Date Last Reviewed: 6/1/2017 2000-2017 [employer]. [address] [place] [address], [place] [address]. All rights reserved. This information is not intended as a substitute for professional medical care. Always follow your healthcare professional's instructions.        Lifestyle Changes to Control Cholesterol  You can control your cholesterol through diet, exercise, weight management, quitting smoking, stress management, and taking your medicines right. These things can also lower your risk for cardiovascular disease.    Eating healthy  Your healthcare provider will  give you information on diet changes you may need to make. Your provider may recommend that you see a registered dietitian for help with diet changes. Changes may include:    Cutting back on the amount of fat and cholesterol in your meals    Eating less salt (sodium). This is especially important if you have high blood pressure.    Eating more fresh vegetables and fruits    Eating lean proteins such as fish, poultry, beans, and peas    Eating less red meat and processed meats    Using low-fat dairy products    Using vegetable and nut oils in limited amounts    Limiting how many sweets and processed foods like chips, cookies, and baked goods that you eat     Limiting how many sugar-sweetened beverages you drink    Limiting how often you eat out  Getting exercise  Regular exercise is a good way to help your body control cholesterol. Regular exercise can help in many ways. It can:    Raise your good cholesterol    Help lower your bad cholesterol    Let blood flow better through your body    Give more oxygen to your muscles and tissues    Help you manage your weight    Help your heart pump better    Lower your blood pressure  Your healthcare provider may recommend that you get more physical activity if you haven't been active. Your provider may recommend that you get moderate to vigorous physical activity for at least 40 minutes each day. You should do this for at least 3 to 4 days each week. A few examples of moderate to vigorous activity are:    Walking at a brisk pace. This is about 3 to 4 miles per hour.    Jogging or running    Swimming or water aerobics    Hiking    Dancing    Martial arts    Tennis    Riding a bicycle or stationary bike    Dancing  Managing your weight  If you are overweight or obese, your healthcare provider will work with you to help you lose weight and lower your BMI (body mass index). Making diet changes and getting more physical activity can help. Changing your diet will help you lose weight  more easily than adding exercise.  Quitting smoking  Smoking and other tobacco use can raise cholesterol and make it harder to control. Quitting is tough. But millions of people have given up tobacco for good. You can quit, too! Think about some of the reasons below to quit smoking. Do any of them make you think twice about your smoking habit?  Stop smoking because it:    Keeps your cholesterol high, even if you make all the other changes you re supposed to    Damages your body. It especially harms your heart, lungs, skin, and blood vessels.    Makes you more likely to have a heart attack (acute myocardial infarction), stroke, or cancer    Stains your teeth    Makes your skin, clothes, and breath smell bad    Costs a lot of money  Controlling stress   Learn ways to control stress. This will help you deal with stress in your home and work life. Controlling stress can greatly lower your risk of getting cardiovascular disease.  Making the most of medicines  Healthy eating and exercise are a good start to keeping your cholesterol down. But you may need some extra help from medicine. If your doctor prescribes medicine, be sure to take it exactly as directed. Remember:    Tell your healthcare provider about all other medicines you take. This includes vitamins and herbs.    Tell your healthcare provider if you have any side effects after starting to take a medicine. Examples of side effects to watch for include muscle aches, weakness, blurred vision, rust-colored urine, yellowing of eyes or skin (jaundice), and headache.    Don t skip a dose or stop taking your medicine because you feel better or because your cholesterol numbers go down. Never stop taking your medicine unless your healthcare provider has told you it s OK.    Ask your healthcare provider if you have any questions about your medicines.  High risk groups  Some people may need to take medicines called statins to control their cholesterol. This is in addition  to eating a healthy diet and getting regular exercise.  Statins can help you stay healthy. They can also help prevent a heart attack or stroke. You may need to take a statin if you are in one of these groups:    Adults who have had a heart attack or stroke. Or adults who have had peripheral vascular disease, a ministroke (transient ischemic attack), or stable or unstable angina. This group also includes people who have had a procedure to restore blood flow through a blocked artery. These procedures include percutaneous coronary intervention, angioplasty, stent, and open-heart bypass surgery.    Adults who have diabetes. Or adults who are at higher risk of having a heart attack or stroke and have an LDL cholesterol level of 70 to 189 mg/dL    Adults who are 21 years old or older and have an LDL cholesterol level of 190 mg/dL or higher.  If you are in a high-risk group, talk with your healthcare provider about your treatment goals. Make sure you understand why these goals are important, based on your own health history and your family history of heart disease or high cholesterol.  Make a plan to have regular cholesterol checks. You may need to fast before getting this test. Also ask your provider about any side effects your medicines may cause. Let your provider know about any side effects you have. You may need to take more than one medicine to reach the cholesterol goals that you and your provider decide on.  Date Last Reviewed: 10/1/2016    1019-6543 [employer]. [address] [place] [address], [place] [address]. All rights reserved. This information is not intended as a substitute for professional medical care. Always follow your healthcare professional's instructions.        Understanding Food and Cholesterol  Having a high cholesterol level puts you at risk for heart disease and other health problems. What you eat has a big effect on your body s cholesterol level. Eating certain foods can raise your  cholesterol. Other foods can help you lower it. Watching what you eat can help you get your cholesterol level under control.  Know which foods are high in saturated fat and trans fat  Foods high in saturated fat and trans fat can raise your LDL (bad) cholesterol. It s important to knowing which foods are high in these fats, and eat less of them. This can help you manage your cholesterol levels.  Foods high in these fats are:    Animal products, including beef, lamb, pork, and poultry with skin on    Cold cuts, hankins, sausage    Creamy sauces and fatty gravies    Cookies, donuts, muffins, and pastries    Fried foods    Shortening, butter, coconut oil, palm oil, cocoa butter, partially hydrogenated oils (read labels)    High-fat dairy products such as whole milk, cheese, cream cheese, and ice cream  Better choices:    Lean beef, skinless white-meat poultry, fish    Tomato sauce, vegetable puree    Dried fruit, bagels, bread with jam    Baked, broiled, steamed, or roasted foods    Soft (tub) margarine, canola oil, and olive oil in moderate amounts    Low-fat or nonfat dairy products, such as 1% or fat-free milk, and reduced-fat cheese  Use fiber to help control cholesterol  Foods high in fiber can help you keep your cholesterol down. Good sources of fiber are:    Oats    Barley    Whole grains    Beans    Vegetables    Cornmeal    Popcorn    Berries, apples, other fruits  Date Last Reviewed: 8/10/2015    3884-8338 The "[employer] [address] [place] [address], [place] [address]. All rights reserved. This information is not intended as a substitute for professional medical care. Always follow your healthcare professional's instructions.

## 2018-05-17 ENCOUNTER — TELEPHONE (OUTPATIENT)
Dept: FAMILY MEDICINE | Facility: OTHER | Age: 63
End: 2018-05-17

## 2018-05-17 DIAGNOSIS — E78.2 MIXED HYPERLIPIDEMIA: Primary | ICD-10-CM

## 2018-05-17 DIAGNOSIS — Z12.5 SCREENING FOR PROSTATE CANCER: ICD-10-CM

## 2018-05-17 NOTE — TELEPHONE ENCOUNTER
Called and notified patient he may come in for fasting labs prior to his appointment. Please place orders per Dr Elaina Boo.

## 2018-05-17 NOTE — TELEPHONE ENCOUNTER
10:10 AM    Reason for Call: Phone Call    Description: Patient has an appt on 5/29/18 for his BP med review appt - he is questioning if he has to fast for the blood draw and if so can he come in early to do labs please call the patient back.    Was an appointment offered for this call? No  If yes : Appointment type              Date    Preferred method for responding to this message: Telephone Call  What is your phone number ? [phone]    If we cannot reach you directly, may we leave a detailed response at the number you provided? Yes    Can this message wait until your PCP/provider returns, if available today? YES,     Elaina Irene

## 2018-05-25 DIAGNOSIS — Z12.5 SCREENING FOR PROSTATE CANCER: ICD-10-CM

## 2018-05-25 DIAGNOSIS — E78.2 MIXED HYPERLIPIDEMIA: ICD-10-CM

## 2018-05-25 LAB
ALBUMIN SERPL-MCNC: 3.9 G/DL (ref 3.4–5)
ALP SERPL-CCNC: 150 U/L (ref 40–150)
ALT SERPL W P-5'-P-CCNC: 136 U/L (ref 0–70)
ANION GAP SERPL CALCULATED.3IONS-SCNC: 6 MMOL/L (ref 3–14)
AST SERPL W P-5'-P-CCNC: 64 U/L (ref 0–45)
BASOPHILS # BLD AUTO: 0.1 10E9/L (ref 0–0.2)
BASOPHILS NFR BLD AUTO: 1 %
BILIRUB SERPL-MCNC: 0.7 MG/DL (ref 0.2–1.3)
BUN SERPL-MCNC: 11 MG/DL (ref 7–30)
CALCIUM SERPL-MCNC: 8.8 MG/DL (ref 8.5–10.1)
CHLORIDE SERPL-SCNC: 109 MMOL/L (ref 94–109)
CHOLEST SERPL-MCNC: 131 MG/DL
CO2 SERPL-SCNC: 26 MMOL/L (ref 20–32)
CREAT SERPL-MCNC: 1.13 MG/DL (ref 0.66–1.25)
DIFFERENTIAL METHOD BLD: NORMAL
EOSINOPHIL # BLD AUTO: 0.3 10E9/L (ref 0–0.7)
EOSINOPHIL NFR BLD AUTO: 4.5 %
ERYTHROCYTE [DISTWIDTH] IN BLOOD BY AUTOMATED COUNT: 13.3 % (ref 10–15)
GFR SERPL CREATININE-BSD FRML MDRD: 66 ML/MIN/1.7M2
GLUCOSE SERPL-MCNC: 100 MG/DL (ref 70–99)
HCT VFR BLD AUTO: 44.9 % (ref 40–53)
HDLC SERPL-MCNC: 56 MG/DL
HGB BLD-MCNC: 15.9 G/DL (ref 13.3–17.7)
IMM GRANULOCYTES # BLD: 0 10E9/L (ref 0–0.4)
IMM GRANULOCYTES NFR BLD: 0.5 %
LDLC SERPL CALC-MCNC: 54 MG/DL
LYMPHOCYTES # BLD AUTO: 1.6 10E9/L (ref 0.8–5.3)
LYMPHOCYTES NFR BLD AUTO: 27.3 %
MCH RBC QN AUTO: 32.1 PG (ref 26.5–33)
MCHC RBC AUTO-ENTMCNC: 35.4 G/DL (ref 31.5–36.5)
MCV RBC AUTO: 91 FL (ref 78–100)
MONOCYTES # BLD AUTO: 0.5 10E9/L (ref 0–1.3)
MONOCYTES NFR BLD AUTO: 9 %
NEUTROPHILS # BLD AUTO: 3.5 10E9/L (ref 1.6–8.3)
NEUTROPHILS NFR BLD AUTO: 57.7 %
NONHDLC SERPL-MCNC: 75 MG/DL
NRBC # BLD AUTO: 0 10*3/UL
NRBC BLD AUTO-RTO: 0 /100
PLATELET # BLD AUTO: 163 10E9/L (ref 150–450)
POTASSIUM SERPL-SCNC: 4 MMOL/L (ref 3.4–5.3)
PROT SERPL-MCNC: 7.5 G/DL (ref 6.8–8.8)
PSA SERPL-ACNC: 0.86 UG/L (ref 0–4)
RBC # BLD AUTO: 4.95 10E12/L (ref 4.4–5.9)
SODIUM SERPL-SCNC: 141 MMOL/L (ref 133–144)
TRIGL SERPL-MCNC: 103 MG/DL
TSH SERPL DL<=0.005 MIU/L-ACNC: 1.38 MU/L (ref 0.4–4)
WBC # BLD AUTO: 6 10E9/L (ref 4–11)

## 2018-05-25 PROCEDURE — 80061 LIPID PANEL: CPT | Performed by: FAMILY MEDICINE

## 2018-05-25 PROCEDURE — 80050 GENERAL HEALTH PANEL: CPT | Performed by: FAMILY MEDICINE

## 2018-05-25 PROCEDURE — G0103 PSA SCREENING: HCPCS | Performed by: FAMILY MEDICINE

## 2018-05-25 PROCEDURE — 36415 COLL VENOUS BLD VENIPUNCTURE: CPT | Performed by: FAMILY MEDICINE

## 2018-05-29 ENCOUNTER — OFFICE VISIT (OUTPATIENT)
Dept: FAMILY MEDICINE | Facility: OTHER | Age: 63
End: 2018-05-29
Attending: FAMILY MEDICINE
Payer: COMMERCIAL

## 2018-05-29 VITALS
HEART RATE: 68 BPM | TEMPERATURE: 98.9 F | DIASTOLIC BLOOD PRESSURE: 64 MMHG | WEIGHT: 194 LBS | HEIGHT: 69 IN | BODY MASS INDEX: 28.73 KG/M2 | SYSTOLIC BLOOD PRESSURE: 118 MMHG | OXYGEN SATURATION: 98 %

## 2018-05-29 DIAGNOSIS — E78.2 MIXED HYPERLIPIDEMIA: Primary | ICD-10-CM

## 2018-05-29 DIAGNOSIS — R79.89 ABNORMAL LFTS (LIVER FUNCTION TESTS): ICD-10-CM

## 2018-05-29 PROCEDURE — 99213 OFFICE O/P EST LOW 20 MIN: CPT | Performed by: FAMILY MEDICINE

## 2018-05-29 ASSESSMENT — ANXIETY QUESTIONNAIRES
6. BECOMING EASILY ANNOYED OR IRRITABLE: NOT AT ALL
5. BEING SO RESTLESS THAT IT IS HARD TO SIT STILL: NOT AT ALL
GAD7 TOTAL SCORE: 0
3. WORRYING TOO MUCH ABOUT DIFFERENT THINGS: NOT AT ALL
4. TROUBLE RELAXING: NOT AT ALL
1. FEELING NERVOUS, ANXIOUS, OR ON EDGE: NOT AT ALL
7. FEELING AFRAID AS IF SOMETHING AWFUL MIGHT HAPPEN: NOT AT ALL
2. NOT BEING ABLE TO STOP OR CONTROL WORRYING: NOT AT ALL

## 2018-05-29 ASSESSMENT — PAIN SCALES - GENERAL: PAINLEVEL: NO PAIN (0)

## 2018-05-29 NOTE — NURSING NOTE
"Chief Complaint   Patient presents with     Recheck Medication       Initial /64 (BP Location: Left arm, Patient Position: Sitting, Cuff Size: Adult Regular)  Pulse 68  Temp 98.9  F (37.2  C) (Tympanic)  Ht 5' 9\" (1.753 m)  Wt 194 lb (88 kg)  SpO2 98%  BMI 28.65 kg/m2 Estimated body mass index is 28.65 kg/(m^2) as calculated from the following:    Height as of this encounter: 5' 9\" (1.753 m).    Weight as of this encounter: 194 lb (88 kg).  Medication Reconciliation: complete    Caitlyn Miller LPN    "

## 2018-05-29 NOTE — MR AVS SNAPSHOT
After Visit Summary   5/29/2018    Atul Coker    MRN: [id number]           Patient Information     Date Of Birth          1955        Visit Information        Provider Department      5/29/2018 3:00 PM Jatin Carbajal MD [facility]        Today's Diagnoses     Dyslipidemia    -  1    Abnormal LFTs (liver function tests)          Care Instructions    Continue same medications.  Repeat liver test in 3 months.  Cholesterol in one year          Follow-ups after your visit        Follow-up notes from your care team     Return in about 3 months (around 8/29/2018) for Lab testing.      Who to contact     If you have questions or need follow up information about today's clinic visit or your schedule please contact [facility] directly at [phone].  Normal or non-critical lab and imaging results will be communicated to you by BeatSwitchhart, letter or phone within 4 business days after the clinic has received the results. If you do not hear from us within 7 days, please contact the clinic through BeatSwitchhart or phone. If you have a critical or abnormal lab result, we will notify you by phone as soon as possible.  Submit refill requests through Roomtag or call your pharmacy and they will forward the refill request to us. Please allow 3 business days for your refill to be completed.          Additional Information About Your Visit        MyChart Information     Roomtag gives you secure access to your electronic health record. If you see a primary care provider, you can also send messages to your care team and make appointments. If you have questions, please call your primary care clinic.  If you do not have a primary care provider, please call [phone] and they will assist you.        Care EveryWhere ID     This is your Care EveryWhere ID. This could be used by other organizations to access your [place] medical records  [id number]        Your Vitals Were     Pulse  "Temperature Height Pulse Oximetry BMI (Body Mass Index)       68 98.9  F (37.2  C) (Tympanic) 5' 9\" (1.753 m) 98% 28.65 kg/m2        Blood Pressure from Last 3 Encounters:   05/29/18 118/64   02/02/18 132/62   12/12/17 132/68    Weight from Last 3 Encounters:   05/29/18 194 lb (88 kg)   02/02/18 194 lb (88 kg)   12/12/17 194 lb 9.6 oz (88.3 kg)              Today, you had the following     No orders found for display       Primary Care Provider Office Phone # Fax #    Jatin Carbajal [phone] [address] [address]        Equal Access to Services     VADIM MEDRANO : Negro nice Soblayne, waaxda luqadaha, qaybta kaalmada adeegyada, jose beyer . So [facility] [phone].    ATENCIÓN: Si habla español, tiene a oscar disposición servicios gratuitos de asistencia lingüística. [facility] [phone].    We comply with applicable federal civil rights laws and [place] laws. We do not discriminate on the basis of race, color, national origin, age, disability, sex, sexual orientation, or gender identity.            Thank you!     Thank you for choosing [facility]  for your care. Our goal is always to provide you with excellent care. Hearing back from our patients is one way we can continue to improve our services. Please take a few minutes to complete the written survey that you may receive in the mail after your visit with us. Thank you!             Your Updated Medication List - Protect others around you: Learn how to safely use, store and throw away your medicines at www.disposemymeds.org.          This list is accurate as of 5/29/18 11:59 PM.  Always use your most recent med list.                   Brand Name Dispense Instructions for use Diagnosis    ALLEGRA ALLERGY 180 MG tablet   Generic drug:  fexofenadine      Take 1 tablet by mouth daily.        ASPIRIN PO      Take 81 mg by mouth daily        Azelastine HCl 137 MCG/SPRAY Soln "     30 mL    SPRAY 2 SPRAYS INTO BOTH NOSTRILS 2 TIMES DAILY    Chronic seasonal allergic rhinitis due to pollen       guaiFENesin 600 MG 12 hr tablet    MUCINEX     Take 600 mg by mouth daily        Multi-vitamin Tabs tablet      Take 1 tablet by mouth daily        omeprazole 40 MG capsule    priLOSEC    30 capsule    Take 1 capsule (40 mg) by mouth daily Take 30-60 minutes before a meal.    Atypical chest pain       order for DME     1 Units    Equipment being ordered: Left stirrup brace    DX:M25.572 LEFT ANKLE PAIN    Left ankle pain       order for DME     1 Device    Equipment being ordered: Orthotics    Chronic bilateral low back pain without sciatica       PEPCID AC MAXIMUM STRENGTH PO      Take 20 mg by mouth daily        simvastatin 20 MG tablet    ZOCOR    90 tablet    Take 1 tablet (20 mg) by mouth At Bedtime    Dyslipidemia

## 2018-05-29 NOTE — PROGRESS NOTES
SUBJECTIVE:   Atul Coker is a 63 year old male who presents to clinic today for the following health issues:     Hyperlipidemia Follow-Up      Rate your low fat/cholesterol diet?: fair    Taking statin?  Yes, no muscle aches from statin    Other lipid medications/supplements?:  none    Eliazar was started on simvastatin (Zocor) and his lipids are significantly improved.  He was noted to have a mild increase in his ALT and AST.  He remains asymptomatic.    Problem list and histories reviewed & adjusted, as indicated.  Additional history: as documented    Patient Active Problem List   Diagnosis     GERD (gastroesophageal reflux disease)     CMV (cytomegalovirus) positive (H)     Dyslipidemia     Seasonal allergic rhinitis     Chronic bilateral low back pain without sciatica     Past Surgical History:   Procedure Laterality Date     COLONOSCOPY  08-    repeat in 10 yrs     HERNIA REPAIR, INGUINAL RT/LT  2013    Right side     UPPER GI ENDOSCOPY       VASECTOMY  2013       Social History   Substance Use Topics     Smoking status: Never Smoker     Smokeless tobacco: Never Used     Alcohol use Yes      Comment: occasionally     Family History   Problem Relation Age of Onset     C.A.D. Father      Myocardial Infarction Father      myocardial infarction     C.A.D. Mother      Other - See Comments Mother      colon polyps     Hypertension Mother      Other - See Comments Brother      end stage liver disease - cause of death     Hypertension Brother      Hypertension Sister          Current Outpatient Prescriptions   Medication Sig Dispense Refill     ASPIRIN PO Take 81 mg by mouth daily        Azelastine HCl 137 MCG/SPRAY SOLN SPRAY 2 SPRAYS INTO BOTH NOSTRILS 2 TIMES DAILY 30 mL 1     Famotidine (PEPCID AC MAXIMUM STRENGTH PO) Take 20 mg by mouth daily       fexofenadine (ALLEGRA ALLERGY) 180 MG tablet Take 1 tablet by mouth daily.       guaiFENesin (MUCINEX) 600 MG 12 hr tablet Take 600 mg by mouth daily        "multivitamin, therapeutic with minerals (MULTI-VITAMIN) TABS Take 1 tablet by mouth daily       omeprazole (PRILOSEC) 40 MG capsule Take 1 capsule (40 mg) by mouth daily Take 30-60 minutes before a meal. 30 capsule 3     order for DME Equipment being ordered: Orthotics 1 Device 0     order for DME Equipment being ordered: Left stirrup brace        DX:M25.572 LEFT ANKLE PAIN 1 Units 1     simvastatin (ZOCOR) 20 MG tablet Take 1 tablet (20 mg) by mouth At Bedtime 90 tablet 1     Allergies   Allergen Reactions     Seasonal Allergies      Penicillins Rash       Reviewed and updated as needed this visit by clinical staff  Tobacco  Allergies  Meds  Problems       Reviewed and updated as needed this visit by Provider         ROS:  Constitutional, HEENT, cardiovascular, pulmonary, gi and gu systems are negative, except as otherwise noted.    OBJECTIVE:     /64 (BP Location: Left arm, Patient Position: Sitting, Cuff Size: Adult Regular)  Pulse 68  Temp 98.9  F (37.2  C) (Tympanic)  Ht 5' 9\" (1.753 m)  Wt 194 lb (88 kg)  SpO2 98%  BMI 28.65 kg/m2  Body mass index is 28.65 kg/(m^2).  Physical Exam   Constitutional: He is oriented to person, place, and time. He appears well-developed and well-nourished. No distress.   Neurological: He is alert and oriented to person, place, and time.   Psychiatric: He has a normal mood and affect.       Other exam not repeated.  Diagnostic Test Results:  none     ASSESSMENT/PLAN:     Dyslipidemia  Reviewed recent lipids.  Continue diet and exercise.  Recheck one year.    Abnormal LFTs (liver function tests)  Mild increase in ALT and AST.  Will repeat in 3 months.            Jatin Carbajal MD  [facility] [place]    "

## 2018-05-30 ASSESSMENT — ANXIETY QUESTIONNAIRES: GAD7 TOTAL SCORE: 0

## 2018-05-30 ASSESSMENT — PATIENT HEALTH QUESTIONNAIRE - PHQ9: SUM OF ALL RESPONSES TO PHQ QUESTIONS 1-9: 0

## 2018-11-09 ENCOUNTER — OFFICE VISIT (OUTPATIENT)
Dept: FAMILY MEDICINE | Facility: OTHER | Age: 63
End: 2018-11-09
Attending: FAMILY MEDICINE
Payer: COMMERCIAL

## 2018-11-09 VITALS
WEIGHT: 190 LBS | HEART RATE: 61 BPM | OXYGEN SATURATION: 98 % | BODY MASS INDEX: 28.06 KG/M2 | SYSTOLIC BLOOD PRESSURE: 112 MMHG | DIASTOLIC BLOOD PRESSURE: 78 MMHG | TEMPERATURE: 97 F

## 2018-11-09 DIAGNOSIS — R10.32 ABDOMINAL PAIN, LLQ: Primary | ICD-10-CM

## 2018-11-09 PROCEDURE — 99213 OFFICE O/P EST LOW 20 MIN: CPT | Performed by: FAMILY MEDICINE

## 2018-11-09 RX ORDER — AMPICILLIN TRIHYDRATE 250 MG
1 CAPSULE ORAL DAILY
COMMUNITY

## 2018-11-09 ASSESSMENT — ANXIETY QUESTIONNAIRES
5. BEING SO RESTLESS THAT IT IS HARD TO SIT STILL: NOT AT ALL
GAD7 TOTAL SCORE: 0
1. FEELING NERVOUS, ANXIOUS, OR ON EDGE: NOT AT ALL
2. NOT BEING ABLE TO STOP OR CONTROL WORRYING: NOT AT ALL
6. BECOMING EASILY ANNOYED OR IRRITABLE: NOT AT ALL
7. FEELING AFRAID AS IF SOMETHING AWFUL MIGHT HAPPEN: NOT AT ALL
3. WORRYING TOO MUCH ABOUT DIFFERENT THINGS: NOT AT ALL

## 2018-11-09 ASSESSMENT — PATIENT HEALTH QUESTIONNAIRE - PHQ9
5. POOR APPETITE OR OVEREATING: NOT AT ALL
SUM OF ALL RESPONSES TO PHQ QUESTIONS 1-9: 0

## 2018-11-09 ASSESSMENT — PAIN SCALES - GENERAL: PAINLEVEL: NO PAIN (0)

## 2018-11-09 NOTE — PROGRESS NOTES
"  SUBJECTIVE:   Atul Coker is a 63 year old male who presents to clinic today for the following health issues:    Abdominal Pain      Duration: Flash of a moment on Sunday; similar to muscle cramping, everything subsided with in the next few days, no S&S since except a \"odd sensation\"    Description (location/character/radiation): Abdomen Left lower quadrant; pain was like a \"cramp\"       Associated flank pain: None    Intensity:  mild    Accompanying signs and symptoms:        Fever/Chills: no        Gas/Bloating: no        Nausea/vomitting: no        Diarrhea: no        Dysuria or Hematuria: no     History (previous similar pain/trauma/previous testing): None    Precipitating or alleviating factors:       Pain worse with eating/BM/urination: N/A       Pain relieved by BM: no     Therapies tried and outcome: None    LMP:  not applicable      Problem list and histories reviewed & adjusted, as indicated.  Additional history: as documented    Patient Active Problem List   Diagnosis     GERD (gastroesophageal reflux disease)     CMV (cytomegalovirus) positive (H)     Dyslipidemia     Seasonal allergic rhinitis     Chronic bilateral low back pain without sciatica     Past Surgical History:   Procedure Laterality Date     COLONOSCOPY  08-    repeat in 10 yrs     HERNIA REPAIR, INGUINAL RT/LT  2013    Right side     UPPER GI ENDOSCOPY       VASECTOMY  2013       Social History   Substance Use Topics     Smoking status: Never Smoker     Smokeless tobacco: Never Used     Alcohol use Yes      Comment: occasionally     Family History   Problem Relation Age of Onset     C.A.D. Father      Myocardial Infarction Father      myocardial infarction     C.A.D. Mother      Other - See Comments Mother      colon polyps     Hypertension Mother      Other - See Comments Brother      end stage liver disease - cause of death     Hypertension Brother      Hypertension Sister          Current Outpatient Prescriptions "   Medication Sig Dispense Refill     ASPIRIN PO Take 81 mg by mouth daily        Azelastine HCl 137 MCG/SPRAY SOLN SPRAY 2 SPRAYS INTO BOTH NOSTRILS 2 TIMES DAILY 30 mL 5     Coenzyme Q10 (COQ10) 200 MG CAPS Take 1 capsule by mouth daily       Famotidine (PEPCID AC MAXIMUM STRENGTH PO) Take 20 mg by mouth daily       fexofenadine (ALLEGRA ALLERGY) 180 MG tablet Take 1 tablet by mouth daily.       guaiFENesin (MUCINEX) 600 MG 12 hr tablet Take 600 mg by mouth daily       multivitamin, therapeutic with minerals (MULTI-VITAMIN) TABS Take 1 tablet by mouth daily       omeprazole (PRILOSEC) 40 MG capsule TAKE 1 CAPSULE (40 MG) BY MOUTH DAILY TAKE 30-60 MINUTES BEFORE A MEAL. 30 capsule 10     order for DME Equipment being ordered: Orthotics 1 Device 0     order for DME Equipment being ordered: Left stirrup brace        DX:M25.572 LEFT ANKLE PAIN 1 Units 1     simvastatin (ZOCOR) 20 MG tablet TAKE 1 TABLET (20 MG) BY MOUTH AT BEDTIME 90 tablet 1     Allergies   Allergen Reactions     Seasonal Allergies      Penicillins Rash     BP Readings from Last 3 Encounters:   11/09/18 112/78   05/29/18 118/64   02/02/18 132/62    Wt Readings from Last 3 Encounters:   11/09/18 190 lb (86.2 kg)   05/29/18 194 lb (88 kg)   02/02/18 194 lb (88 kg)                    Reviewed and updated as needed this visit by clinical staff  Tobacco  Allergies  Meds  Med Hx  Surg Hx  Fam Hx  Soc Hx      Reviewed and updated as needed this visit by Provider         ROS:  Constitutional, HEENT, cardiovascular, pulmonary, gi and gu systems are negative, except as otherwise noted.    OBJECTIVE:     /78 (BP Location: Left arm, Patient Position: Sitting, Cuff Size: Adult Regular)  Pulse 61  Temp 97  F (36.1  C) (Tympanic)  Wt 190 lb (86.2 kg)  SpO2 98%  BMI 28.06 kg/m2  Body mass index is 28.06 kg/(m^2).  Physical Exam   Constitutional: He is oriented to person, place, and time. He appears well-developed and well-nourished. No distress.    Abdominal: Soft. Normal appearance. He exhibits no mass. There is no hepatosplenomegaly. There is tenderness in the left lower quadrant. There is no CVA tenderness. No hernia.   Neurological: He is alert and oriented to person, place, and time.   Psychiatric: He has a normal mood and affect.       Other exam not repeated.  Diagnostic Test Results:  none     ASSESSMENT/PLAN:     Abdominal pain, LLQ  Suspect resolving diverticulitis.  This is discussed.  Will follow.          Jatin Carbajal MD  [facility]

## 2018-11-09 NOTE — MR AVS SNAPSHOT
After Visit Summary   11/9/2018    Atul Coker    MRN: [id number]           Patient Information     Date Of Birth          1955        Visit Information        Provider Department      11/9/2018 3:20 PM Jatin Carbajal MD [facility]        Today's Diagnoses     Abdominal pain, LLQ    -  1      Care Instructions    Will follow          Follow-ups after your visit        Follow-up notes from your care team     Return if symptoms worsen or fail to improve.      Who to contact     If you have questions or need follow up information about today's clinic visit or your schedule please contact [facility] directly at [phone].  Normal or non-critical lab and imaging results will be communicated to you by MyChart, letter or phone within 4 business days after the clinic has received the results. If you do not hear from us within 7 days, please contact the clinic through Copinyhart or phone. If you have a critical or abnormal lab result, we will notify you by phone as soon as possible.  Submit refill requests through Classical Connection or call your pharmacy and they will forward the refill request to us. Please allow 3 business days for your refill to be completed.          Additional Information About Your Visit        MyChart Information     Classical Connection gives you secure access to your electronic health record. If you see a primary care provider, you can also send messages to your care team and make appointments. If you have questions, please call your primary care clinic.  If you do not have a primary care provider, please call [phone] and they will assist you.        Care EveryWhere ID     This is your Care EveryWhere ID. This could be used by other organizations to access your [place] medical records  [id number]        Your Vitals Were     Pulse Temperature Pulse Oximetry BMI (Body Mass Index)          61 97  F (36.1  C) (Tympanic) 98% 28.06 kg/m2          Blood Pressure from Last 3 Encounters:   11/09/18 112/78   05/29/18 118/64   02/02/18 132/62    Weight from Last 3 Encounters:   11/09/18 190 lb (86.2 kg)   05/29/18 194 lb (88 kg)   02/02/18 194 lb (88 kg)              Today, you had the following     No orders found for display       Primary Care Provider Office Phone # Fax #    Jatin Carbajal [phone]       [facility] [address]        Equal Access to Services     [facility]: Hadii aad ku hadasho Soomaali, waaxda luqadaha, qaybta kaalmada adeegyadenis, jose beyer . So [facility] [phone].    ATENCIÓN: Si habla español, tiene a oscar disposición servicios gratuitos de asistencia lingüística. [facility] [phone].    We comply with applicable federal civil rights laws and [place] laws. We do not discriminate on the basis of race, color, national origin, age, disability, sex, sexual orientation, or gender identity.            Thank you!     Thank you for choosing [facility]  for your care. Our goal is always to provide you with excellent care. Hearing back from our patients is one way we can continue to improve our services. Please take a few minutes to complete the written survey that you may receive in the mail after your visit with us. Thank you!             Your Updated Medication List - Protect others around you: Learn how to safely use, store and throw away your medicines at www.disposemymeds.org.          This list is accurate as of 11/9/18 11:59 PM.  Always use your most recent med list.                   Brand Name Dispense Instructions for use Diagnosis    ALLEGRA ALLERGY 180 MG tablet   Generic drug:  fexofenadine      Take 1 tablet by mouth daily.        ASPIRIN PO      Take 81 mg by mouth daily        Azelastine HCl 137 MCG/SPRAY Soln     30 mL    SPRAY 2 SPRAYS INTO BOTH NOSTRILS 2 TIMES DAILY    Chronic seasonal allergic rhinitis due to pollen       CoQ10 200 MG  Caps      Take 1 capsule by mouth daily        guaiFENesin 600 MG 12 hr tablet    MUCINEX     Take 600 mg by mouth daily        Multi-vitamin Tabs tablet      Take 1 tablet by mouth daily        omeprazole 40 MG capsule    priLOSEC    30 capsule    TAKE 1 CAPSULE (40 MG) BY MOUTH DAILY TAKE 30-60 MINUTES BEFORE A MEAL.    Atypical chest pain       order for DME     1 Units    Equipment being ordered: Left stirrup brace    DX:M25.572 LEFT ANKLE PAIN    Left ankle pain       order for DME     1 Device    Equipment being ordered: Orthotics    Chronic bilateral low back pain without sciatica       PEPCID AC MAXIMUM STRENGTH PO      Take 20 mg by mouth daily        simvastatin 20 MG tablet    ZOCOR    90 tablet    TAKE 1 TABLET (20 MG) BY MOUTH AT BEDTIME    Dyslipidemia

## 2018-11-09 NOTE — NURSING NOTE
"Chief Complaint   Patient presents with     Abdominal Pain       Initial /78 (BP Location: Left arm, Patient Position: Sitting, Cuff Size: Adult Regular)  Pulse 61  Temp 97  F (36.1  C) (Tympanic)  Wt 190 lb (86.2 kg)  SpO2 98%  BMI 28.06 kg/m2 Estimated body mass index is 28.06 kg/(m^2) as calculated from the following:    Height as of 5/29/18: 5' 9\" (1.753 m).    Weight as of this encounter: 190 lb (86.2 kg).  Medication Reconciliation: complete   Patient declined Influenza vaccine as previously received; located documentation in medication reconciliation; health maintenance updated.    Odilia Lopes LPN  "

## 2018-11-10 ASSESSMENT — ANXIETY QUESTIONNAIRES: GAD7 TOTAL SCORE: 0

## 2019-01-15 DIAGNOSIS — J30.1 CHRONIC SEASONAL ALLERGIC RHINITIS DUE TO POLLEN: ICD-10-CM

## 2019-01-16 RX ORDER — AZELASTINE HYDROCHLORIDE 137 UG/1
SPRAY, METERED NASAL
Qty: 30 ML | Refills: 2 | Status: SHIPPED | OUTPATIENT
Start: 2019-01-16 | End: 2019-05-11

## 2019-02-15 ENCOUNTER — OFFICE VISIT (OUTPATIENT)
Dept: FAMILY MEDICINE | Facility: OTHER | Age: 64
End: 2019-02-15
Attending: FAMILY MEDICINE
Payer: COMMERCIAL

## 2019-02-15 VITALS
SYSTOLIC BLOOD PRESSURE: 118 MMHG | TEMPERATURE: 100.3 F | OXYGEN SATURATION: 96 % | WEIGHT: 196 LBS | DIASTOLIC BLOOD PRESSURE: 78 MMHG | HEART RATE: 88 BPM | BODY MASS INDEX: 28.94 KG/M2

## 2019-02-15 DIAGNOSIS — J01.01 ACUTE RECURRENT MAXILLARY SINUSITIS: Primary | ICD-10-CM

## 2019-02-15 PROCEDURE — 99213 OFFICE O/P EST LOW 20 MIN: CPT | Performed by: FAMILY MEDICINE

## 2019-02-15 RX ORDER — AZITHROMYCIN 250 MG/1
TABLET, FILM COATED ORAL
Qty: 6 TABLET | Refills: 0 | Status: SHIPPED | OUTPATIENT
Start: 2019-02-15 | End: 2019-05-07

## 2019-02-15 NOTE — PROGRESS NOTES
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 Nasal spray      Problem list and histories reviewed & adjusted, as indicated.  Additional history: as documented    Patient Active Problem List   Diagnosis     GERD (gastroesophageal reflux disease)     CMV (cytomegalovirus) positive (H)     Dyslipidemia     Seasonal allergic rhinitis     Chronic bilateral low back pain without sciatica     Past Surgical History:   Procedure Laterality Date     COLONOSCOPY  08-    repeat in 10 yrs     HERNIA REPAIR, INGUINAL RT/LT  2013    Right side     UPPER GI ENDOSCOPY       VASECTOMY  2013       Social History     Tobacco Use     Smoking status: Never Smoker     Smokeless tobacco: Never Used   Substance Use Topics     Alcohol use: Yes     Comment: occasionally     Family History   Problem Relation Age of Onset     C.A.D. Father      Myocardial Infarction Father         myocardial infarction     C.A.D. Mother      Other - See Comments Mother         colon polyps     Hypertension Mother      Other - See Comments Brother         end stage liver disease - cause of death     Hypertension Brother      Hypertension Sister          Current Outpatient Medications   Medication Sig Dispense Refill     ASPIRIN PO Take 81 mg by mouth daily        Azelastine HCl 137 MCG/SPRAY SOLN SPRAY 2 SPRAYS INTO BOTH NOSTRILS 2 TIMES DAILY 30 mL 2     Coenzyme Q10 (COQ10) 200 MG CAPS Take 1 capsule by mouth daily       Famotidine  (PEPCID AC MAXIMUM STRENGTH PO) Take 20 mg by mouth daily       fexofenadine (ALLEGRA ALLERGY) 180 MG tablet Take 1 tablet by mouth daily.       guaiFENesin (MUCINEX) 600 MG 12 hr tablet Take 600 mg by mouth daily       multivitamin, therapeutic with minerals (MULTI-VITAMIN) TABS Take 1 tablet by mouth daily       omeprazole (PRILOSEC) 40 MG capsule TAKE 1 CAPSULE (40 MG) BY MOUTH DAILY TAKE 30-60 MINUTES BEFORE A MEAL. 30 capsule 10     order for DME Equipment being ordered: Orthotics 1 Device 0     order for DME Equipment being ordered: Left stirrup brace        DX:M25.572 LEFT ANKLE PAIN 1 Units 1     simvastatin (ZOCOR) 20 MG tablet TAKE 1 TABLET (20 MG) BY MOUTH AT BEDTIME 90 tablet 1     Allergies   Allergen Reactions     Seasonal Allergies      Penicillins Rash       Reviewed and updated as needed this visit by clinical staff       Reviewed and updated as needed this visit by Provider         ROS:  Constitutional, HEENT, cardiovascular, pulmonary, gi and gu systems are negative, except as otherwise noted.    OBJECTIVE:     /78   Pulse 88   Temp 100.3  F (37.9  C) (Tympanic)   Wt 88.9 kg (196 lb)   SpO2 96%   BMI 28.94 kg/m    Body mass index is 28.94 kg/m .  Physical Exam   Constitutional: He is oriented to person, place, and time. He appears well-developed and well-nourished. No distress.   HENT:   Head: Normocephalic.   Right Ear: Hearing, tympanic membrane, external ear and ear canal normal.   Left Ear: Hearing, tympanic membrane, external ear and ear canal normal.   Nose: Right sinus exhibits maxillary sinus tenderness. Right sinus exhibits no frontal sinus tenderness. Left sinus exhibits maxillary sinus tenderness. Left sinus exhibits no frontal sinus tenderness.   Mouth/Throat: Uvula is midline and oropharynx is clear and moist. No oropharyngeal exudate or posterior oropharyngeal erythema.   Eyes: Conjunctivae are normal.   Neck: Neck supple.   Pulmonary/Chest: Effort normal and breath sounds  normal.   Lymphadenopathy:     He has no cervical adenopathy.   Neurological: He is alert and oriented to person, place, and time.   Skin: Skin is warm and dry.   Psychiatric: He has a normal mood and affect.         Diagnostic Test Results:  none     ASSESSMENT/PLAN:     Acute recurrent maxillary sinusitis  Azithromycin (Zithromax) as written.  Continue over the counter decongestants and hydration.   - azithromycin (ZITHROMAX) 250 MG tablet; Two tablets first day, then one tablet daily for four days.    Jatin Carbajal MD  [facility] - [place]

## 2019-02-15 NOTE — NURSING NOTE
"Chief Complaint   Patient presents with     URI       Initial /78   Pulse 88   Temp 100.3  F (37.9  C) (Tympanic)   Wt 88.9 kg (196 lb)   SpO2 96%   BMI 28.94 kg/m   Estimated body mass index is 28.94 kg/m  as calculated from the following:    Height as of 5/29/18: 1.753 m (5' 9\").    Weight as of this encounter: 88.9 kg (196 lb).  Medication Reconciliation: complete    Michelle Trevino LPN  "

## 2019-02-27 ENCOUNTER — TRANSFERRED RECORDS (OUTPATIENT)
Dept: HEALTH INFORMATION MANAGEMENT | Facility: CLINIC | Age: 64
End: 2019-02-27

## 2019-04-24 ENCOUNTER — TRANSFERRED RECORDS (OUTPATIENT)
Dept: HEALTH INFORMATION MANAGEMENT | Facility: CLINIC | Age: 64
End: 2019-04-24

## 2019-05-06 NOTE — PROGRESS NOTES
SUBJECTIVE:   Atul Coker is a 64 year old male who presents to clinic today for the following   health issues:    Musculoskeletal problem/pain      Duration: weeks    Description  Location: left arms, shoulder, neck and upper back    Intensity:  4/10    Accompanying signs and symptoms: weakness in arm and shoulder and tingling    History  Previous similar problem: no   Previous evaluation:  none    Precipitating or alleviating factors:  Trauma or overuse: no   Aggravating factors include: lifting and exercise    Therapies tried and outcome: nothing    Eliazar has had symptoms of bilateral hane numbness and tingling as well as the Orthopedic complaints as above.  He was treated conservatively for carpal tunnel syndrome but his symptoms persist.        Additional history: as documented    Reviewed  and updated as needed this visit by clinical staff  Tobacco  Allergies  Meds  Problems  Med Hx  Surg Hx  Fam Hx         Reviewed and updated as needed this visit by Provider         Patient Active Problem List   Diagnosis     GERD (gastroesophageal reflux disease)     CMV (cytomegalovirus) positive (H)     Dyslipidemia     Seasonal allergic rhinitis     Chronic bilateral low back pain without sciatica     Past Surgical History:   Procedure Laterality Date     COLONOSCOPY  08-    repeat in 10 yrs     HERNIA REPAIR, INGUINAL RT/LT  2013    Right side     UPPER GI ENDOSCOPY       VASECTOMY  2013       Social History     Tobacco Use     Smoking status: Never Smoker     Smokeless tobacco: Never Used   Substance Use Topics     Alcohol use: Yes     Comment: occasionally     Family History   Problem Relation Age of Onset     C.A.D. Father      Myocardial Infarction Father         myocardial infarction     C.A.D. Mother      Other - See Comments Mother         colon polyps     Hypertension Mother      Other - See Comments Brother         end stage liver disease - cause of death     Hypertension Brother       Hypertension Sister          Current Outpatient Medications   Medication Sig Dispense Refill     ASPIRIN PO Take 81 mg by mouth daily        Azelastine HCl 137 MCG/SPRAY SOLN SPRAY 2 SPRAYS INTO BOTH NOSTRILS 2 TIMES DAILY 30 mL 2     Coenzyme Q10 (COQ10) 200 MG CAPS Take 1 capsule by mouth daily       Famotidine (PEPCID AC MAXIMUM STRENGTH PO) Take 20 mg by mouth daily       fexofenadine (ALLEGRA ALLERGY) 180 MG tablet Take 1 tablet by mouth daily.       guaiFENesin (MUCINEX) 600 MG 12 hr tablet Take 600 mg by mouth daily       multivitamin, therapeutic with minerals (MULTI-VITAMIN) TABS Take 1 tablet by mouth daily       omeprazole (PRILOSEC) 40 MG capsule TAKE 1 CAPSULE (40 MG) BY MOUTH DAILY TAKE 30-60 MINUTES BEFORE A MEAL. 30 capsule 10     order for DME Equipment being ordered: Orthotics 1 Device 0     order for DME Equipment being ordered: Left stirrup brace        DX:M25.572 LEFT ANKLE PAIN 1 Units 1     simvastatin (ZOCOR) 20 MG tablet TAKE 1 TABLET (20 MG) BY MOUTH AT BEDTIME 90 tablet 1     Allergies   Allergen Reactions     Seasonal Allergies      Penicillins Rash     BP Readings from Last 3 Encounters:   05/07/19 143/82   02/15/19 118/78   11/09/18 112/78    Wt Readings from Last 3 Encounters:   02/15/19 88.9 kg (196 lb)   11/09/18 86.2 kg (190 lb)   05/29/18 88 kg (194 lb)                    ROS:  Constitutional, HEENT, cardiovascular, pulmonary, gi and gu systems are negative, except as otherwise noted.    OBJECTIVE:     /82 (BP Location: Left arm, Patient Position: Sitting, Cuff Size: Adult Regular)   Pulse 63   Temp 97.4  F (36.3  C) (Tympanic)   Resp 18   SpO2 98%   There is no height or weight on file to calculate BMI.  Physical Exam   Constitutional: He is oriented to person, place, and time. He appears well-developed and well-nourished. No distress.   Musculoskeletal:   Examination of the left wrist shows a positive Tinel and Phalen sign.  No thenar atrophy.    Examination of the  right wrist shows a positve Tinel and Phalen sign.  No thenar atrophy.    There is  No tenderness noted over the anterior superior aspect of the left shoulder.  No crepitus is appreciated. ROM l in forward flexion, abduction, and internal rotation is intact.  Impingement sign is negative.  Pulses are intact.     Neurological: He is alert and oriented to person, place, and time.   Psychiatric: He has a normal mood and affect.         Diagnostic Test Results:  none     ASSESSMENT/PLAN:     Bilateral hand numbness  Suspect the majority of his complaints are related to carpal tunnel syndrome.  EMG and Neurology consultation scheduled.  He will return for complete physical.  - NEUROLOGY ADULT REFERRAL    Special screening for malignant neoplasms, colon  Appointment with General Surgery scheduled for evaluation and recommendations for further management.   - GENERAL SURG ADULT REFERRAL    Jatin Carbajal MD  [facility]

## 2019-05-07 ENCOUNTER — OFFICE VISIT (OUTPATIENT)
Dept: FAMILY MEDICINE | Facility: OTHER | Age: 64
End: 2019-05-07
Attending: FAMILY MEDICINE
Payer: COMMERCIAL

## 2019-05-07 VITALS
HEART RATE: 63 BPM | RESPIRATION RATE: 18 BRPM | SYSTOLIC BLOOD PRESSURE: 143 MMHG | TEMPERATURE: 97.4 F | OXYGEN SATURATION: 98 % | DIASTOLIC BLOOD PRESSURE: 82 MMHG

## 2019-05-07 DIAGNOSIS — Z12.11 SPECIAL SCREENING FOR MALIGNANT NEOPLASMS, COLON: Primary | ICD-10-CM

## 2019-05-07 DIAGNOSIS — R20.0 BILATERAL HAND NUMBNESS: ICD-10-CM

## 2019-05-07 PROCEDURE — 99213 OFFICE O/P EST LOW 20 MIN: CPT | Performed by: FAMILY MEDICINE

## 2019-05-07 ASSESSMENT — PAIN SCALES - GENERAL: PAINLEVEL: MODERATE PAIN (4)

## 2019-05-07 NOTE — NURSING NOTE
"Chief Complaint   Patient presents with     Musculoskeletal Problem       Initial /82 (BP Location: Left arm, Patient Position: Sitting, Cuff Size: Adult Regular)   Pulse 63   Temp 97.4  F (36.3  C) (Tympanic)   Resp 18   SpO2 98%  Estimated body mass index is 28.94 kg/m  as calculated from the following:    Height as of 5/29/18: 1.753 m (5' 9\").    Weight as of 2/15/19: 88.9 kg (196 lb).  Medication Reconciliation: complete    Magdalene Estrada LPN  "

## 2019-05-08 ENCOUNTER — TELEPHONE (OUTPATIENT)
Dept: SURGERY | Facility: OTHER | Age: 64
End: 2019-05-08

## 2019-05-08 DIAGNOSIS — Z12.11 SPECIAL SCREENING FOR MALIGNANT NEOPLASMS, COLON: Primary | ICD-10-CM

## 2019-05-08 NOTE — TELEPHONE ENCOUNTER
Referral received colonoscopy. Approved by surgery education nurse for meet and greet colonoscopy. 1st attempt to call patient to schedule. No answer. Left message for patient to return call.    City: [place]  Phone number: [phone]  Insurance: [facility]  [facility]acy: [facility]  Lab/X-ray/EKG needed: None

## 2019-05-08 NOTE — LETTER
"May 10, 2019          Atul Coker  [address] [place] [address]      Dear Atul,     We want to your procedure to be as pleasant as possible. Please review the instructions below. If you have questions, you may contact us at the any of the following numbers:     [facility] Coordinator: [phone]  Clinic Nurse: [phone]  Surgery Education Nurse: [phone]    Date of Procedure: 5/23/19 with Dr. Groves  Admit Time: Hospital Surgery will call you the day before your procedure by 5pm with your arrival time. If your surgery is on Monday, expect a call on Friday. If you are not contacted before 5PM, please call admitting at [phone]. After hours or on weekends, please call [phone] to postpone.     Call the clinic nurse if you become ill within 1 week of your procedure to reschedule.     7 DAYS BEFORE THE EXAM:  Call the Surgery Education Nurse at [phone] and have a medication list ready.   Do not take Aspirin or NSAIDS (Ibuprofen, Celebrex, Naproxen, etc) 7 days before surgery.  Stop taking fiber supplements, herbals, vitamins, and iron. Stop eating corn, nuts and seeds.  If you are prescribed a daily 81mg Aspirin, you may continue this.   Arrange transportation with a responsible adult or you will be be cancelled.    You have been ordered Gatorade Prep Please purchase the following over the counter items:    Two 5 mg Dulcolax (bisacodyl) tablets   One 8.3 ounce (238 g) bottle of miralax   One 10 ounce bottle of magnesium citrate   One 64 ounce bottle of gatorade-Not red or purple or powdered.    2 DAYS BEFORE THE EXAM:   Low fiber diet. See the list of low fiber foods on page 3 of the \"Miralax, Dulcolax and Magnesium Citrate\" packet. Drink at least 4-6 large glasses of sports drink today and tomorrow. Nothing red or purple.    1 DAY BEFORE THE EXAM:  No solid food or milk products after 12:01 AM. Drink only clear liquids all day, at least 8-10 glasses. Please see the list of clear " "liquids on page 2 of the \"Miralax, Dulcolax and Magnesium Citrate\" packet.  No red, purple or alcohol.             AT 12:00 PM NOON THE DAY BEFORE EXAM:  Take 2 Dulcolax tablets by mouth with clear liquids.            AT 6:00 PM THE DAY BEFORE EXAM:  Mix the bottle of Miralax and 64 oz. of Gatorade in a pitcher. Drink one 8 oz. glass every 10-15 minutes until gone. Stay near a toilet.     DAY OF COLONOSCOPY EXAM:             6 HOURS PRIOR TO EXAM DAY OF PROCEDURE:  Drink the bottle of magnesium citrate followed by a full glass of water. You may have clear liquids up until 3 hours before arrival.  If you need to take any medications after this, take them with a tiny sip of water.   Shower. Wear comfortable clothes. No jewelry, make-up, nail polish, hair spray, lotions, or perfumes. [place] in Admitting through the [facility]. You must have a responsible adult to drive and to stay with you for 4 hours at home.     TIPS FOR COLON CLEANSING BEFORE YOUR COLONOSCOPY  To get accurate results from your exam, your colon must be completely empty or you may need to repeat the colon prep and exam. If you followed instructions and your stool is clear or yellow liquid, you are ready. If you are not sure if your colon is clean, please call the clinic nurse.    You may use Tucks wipes, hemorrhoid treatments, hydrocortisone cream or alcohol-free baby wipes to ease anal irritation. You may also use Vaseline to help protect the skin.     Quickly drink each glass. Even when you are sitting on the toilet, keep drinking every 15 minutes. If you have nausea or vomiting, take a break for 30 minutes and then resume drinking.    You will have loose watery stools and may also have chills. Dress for comfort. Expect to feel bloating, nausea and other discomfort until the stool clears from your colon.           Sincerely,      Dr. David Groves/Humaira YANG LPN            "

## 2019-05-10 NOTE — TELEPHONE ENCOUNTER
Referral received for meet and greet colonoscopy. 2nd attempt to call patient to schedule. No answer. Left message for patient to return call.       City: [place]  Phone number: [phone]  Insurance: [facility]  Pharmacy: [facility]  Lab/X-ray/EKG needed: None

## 2019-05-10 NOTE — TELEPHONE ENCOUNTER
Referral received colonoscopy. Approved by surgery education nurses for meet and greet colonoscopy. Patient scheduled for colonoscopy on 5/23/19 with Dr. Groves at [facility] with Gatorade prep. Instructions given via phone and instructions mailed to patient with surgery handbook.

## 2019-05-17 ENCOUNTER — ANESTHESIA EVENT (OUTPATIENT)
Dept: SURGERY | Facility: HOSPITAL | Age: 64
End: 2019-05-17
Payer: COMMERCIAL

## 2019-05-17 RX ORDER — ONDANSETRON 4 MG/1
4 TABLET, ORALLY DISINTEGRATING ORAL EVERY 30 MIN PRN
Status: CANCELLED | OUTPATIENT
Start: 2019-05-17

## 2019-05-17 RX ORDER — SODIUM CHLORIDE, SODIUM LACTATE, POTASSIUM CHLORIDE, CALCIUM CHLORIDE 600; 310; 30; 20 MG/100ML; MG/100ML; MG/100ML; MG/100ML
INJECTION, SOLUTION INTRAVENOUS CONTINUOUS
Status: CANCELLED | OUTPATIENT
Start: 2019-05-17

## 2019-05-17 RX ORDER — ONDANSETRON 2 MG/ML
4 INJECTION INTRAMUSCULAR; INTRAVENOUS EVERY 30 MIN PRN
Status: CANCELLED | OUTPATIENT
Start: 2019-05-17

## 2019-05-17 RX ORDER — MEPERIDINE HYDROCHLORIDE 50 MG/ML
12.5 INJECTION INTRAMUSCULAR; INTRAVENOUS; SUBCUTANEOUS
Status: CANCELLED | OUTPATIENT
Start: 2019-05-17

## 2019-05-17 RX ORDER — NALOXONE HYDROCHLORIDE 0.4 MG/ML
.1-.4 INJECTION, SOLUTION INTRAMUSCULAR; INTRAVENOUS; SUBCUTANEOUS
Status: CANCELLED | OUTPATIENT
Start: 2019-05-17 | End: 2019-05-18

## 2019-05-17 NOTE — ANESTHESIA PREPROCEDURE EVALUATION
Anesthesia Pre-Procedure Evaluation    Patient: Atul Coker   MRN: [id number] : 1955          Preoperative Diagnosis: SCREENING    Procedure(s):  COLONOSCOPY    Past Medical History:   Diagnosis Date     Allergic rhinitis, cause unspecified 3/5/2013    seasonal     CMV (cytomegalovirus infection) status positive (H)      Other abnormal glucose 3/5/2013    prediabetes     Other nonspecific abnormal serum enzyme levels 3/5/2013     Pure hypercholesterolemia 2012     Vocal cord nodules      Past Surgical History:   Procedure Laterality Date     COLONOSCOPY  2006    repeat in 10 yrs     HERNIA REPAIR, INGUINAL RT/LT      Right side     UPPER GI ENDOSCOPY       VASECTOMY  2013       Anesthesia Evaluation     . Pt has had prior anesthetic. Type: MAC and General    History of anesthetic complications    slow to wake      ROS/MED HX    ENT/Pulmonary:     (+)allergic rhinitis, , . .    Neurologic:  - neg neurologic ROS     Cardiovascular:     (+) Dyslipidemia, ----. : . . . :. . Previous cardiac testing date:results:date: results:ECG reviewed date:5-9-15 results:SR date: results:          METS/Exercise Tolerance:     Hematologic:  - neg hematologic  ROS       Musculoskeletal:   (+)  other musculoskeletal- chronic low back pain      GI/Hepatic:     (+) GERD Asymptomatic on medication, bowel prep,       Renal/Genitourinary:  - ROS Renal section negative       Endo:  - neg endo ROS       Psychiatric:  - neg psychiatric ROS       Infectious Disease:   (+) Other Infectious Disease hx CMV      Malignancy:      - no malignancy   Other:    (+) H/O Chronic Pain,  - neg other ROS                      Physical Exam  Normal systems: cardiovascular, pulmonary and dental    Airway   Mallampati: IV  TM distance: <3 FB  Neck ROM: full  Comment: Possible difficult intubation. Pt reports no problem with previus hernia surgery.     Dental     Cardiovascular   Rhythm and rate: regular and normal      Pulmonary     "breath sounds clear to auscultation            Lab Results   Component Value Date    WBC 6.0 05/25/2018    HGB 15.9 05/25/2018    HCT 44.9 05/25/2018     05/25/2018     05/25/2018    POTASSIUM 4.0 05/25/2018    CHLORIDE 109 05/25/2018    CO2 26 05/25/2018    BUN 11 05/25/2018    CR 1.13 05/25/2018     (H) 05/25/2018    FAVIO 8.8 05/25/2018    ALBUMIN 3.9 05/25/2018    PROTTOTAL 7.5 05/25/2018     (H) 05/25/2018    AST 64 (H) 05/25/2018    ALKPHOS 150 05/25/2018    BILITOTAL 0.7 05/25/2018    TSH 1.38 05/25/2018       Preop Vitals  BP Readings from Last 3 Encounters:   05/07/19 143/82   02/15/19 118/78   11/09/18 112/78    Pulse Readings from Last 3 Encounters:   05/07/19 63   02/15/19 88   11/09/18 61      Resp Readings from Last 3 Encounters:   05/07/19 18   12/12/17 18   06/10/16 20    SpO2 Readings from Last 3 Encounters:   05/07/19 98%   02/15/19 96%   11/09/18 98%      Temp Readings from Last 1 Encounters:   05/07/19 97.4  F (36.3  C) (Tympanic)    Ht Readings from Last 1 Encounters:   05/29/18 1.753 m (5' 9\")      Wt Readings from Last 1 Encounters:   02/15/19 88.9 kg (196 lb)    Estimated body mass index is 28.94 kg/m  as calculated from the following:    Height as of 5/29/18: 1.753 m (5' 9\").    Weight as of 2/15/19: 88.9 kg (196 lb).       Anesthesia Plan      History & Physical Review  History and physical reviewed and following examination; no interval change.    ASA Status:  2 .    NPO Status:  > 8 hours    Plan for MAC with Intravenous induction. Maintenance will be TIVA.  Reason for MAC:  Deep or markedly invasive procedure (G8)    Risks and benefits of MAC anesthetic discussed including dental damage, aspiration, loss of airway, conversion to general anesthetic, CV complications, MI, stroke, death. Pt wishes to proceed.       Postoperative Care  Postoperative pain management:  Multi-modal analgesia.      Consents  Anesthetic plan, risks, benefits and alternatives discussed " with:  Patient.  Use of blood products discussed: Yes.   Use of blood products discussed with Patient.  Consented to blood products.  .                 SHAHZAD Canales CRNA

## 2019-05-23 ENCOUNTER — HOSPITAL ENCOUNTER (OUTPATIENT)
Facility: HOSPITAL | Age: 64
Discharge: HOME | End: 2019-05-23
Attending: SURGERY | Admitting: SURGERY
Payer: COMMERCIAL

## 2019-05-23 ENCOUNTER — ANESTHESIA (OUTPATIENT)
Dept: SURGERY | Facility: HOSPITAL | Age: 64
End: 2019-05-23
Payer: COMMERCIAL

## 2019-05-23 VITALS
SYSTOLIC BLOOD PRESSURE: 117 MMHG | OXYGEN SATURATION: 95 % | BODY MASS INDEX: 26.96 KG/M2 | RESPIRATION RATE: 20 BRPM | TEMPERATURE: 97.8 F | HEART RATE: 81 BPM | WEIGHT: 182 LBS | HEIGHT: 69 IN | DIASTOLIC BLOOD PRESSURE: 80 MMHG

## 2019-05-23 PROCEDURE — 25000128 H RX IP 250 OP 636: Performed by: NURSE ANESTHETIST, CERTIFIED REGISTERED

## 2019-05-23 PROCEDURE — 37000008 ZZH ANESTHESIA TECHNICAL FEE, 1ST 30 MIN: Performed by: SURGERY

## 2019-05-23 PROCEDURE — 01999 UNLISTED ANES PROCEDURE: CPT | Performed by: NURSE ANESTHETIST, CERTIFIED REGISTERED

## 2019-05-23 PROCEDURE — 36000050 ZZH SURGERY LEVEL 2 1ST 30 MIN: Performed by: SURGERY

## 2019-05-23 PROCEDURE — 27210794 ZZH OR GENERAL SUPPLY STERILE: Performed by: SURGERY

## 2019-05-23 PROCEDURE — 25800030 ZZH RX IP 258 OP 636: Performed by: NURSE ANESTHETIST, CERTIFIED REGISTERED

## 2019-05-23 PROCEDURE — 40000305 ZZH STATISTIC PRE PROC ASSESS I: Performed by: SURGERY

## 2019-05-23 PROCEDURE — 88305 TISSUE EXAM BY PATHOLOGIST: CPT | Mod: TC | Performed by: SURGERY

## 2019-05-23 PROCEDURE — 45380 COLONOSCOPY AND BIOPSY: CPT | Mod: PT | Performed by: SURGERY

## 2019-05-23 PROCEDURE — 25000125 ZZHC RX 250: Performed by: NURSE ANESTHETIST, CERTIFIED REGISTERED

## 2019-05-23 PROCEDURE — 71000027 ZZH RECOVERY PHASE 2 EACH 15 MINS: Performed by: SURGERY

## 2019-05-23 RX ORDER — SODIUM CHLORIDE, SODIUM LACTATE, POTASSIUM CHLORIDE, CALCIUM CHLORIDE 600; 310; 30; 20 MG/100ML; MG/100ML; MG/100ML; MG/100ML
INJECTION, SOLUTION INTRAVENOUS CONTINUOUS
Status: DISCONTINUED | OUTPATIENT
Start: 2019-05-23 | End: 2019-05-23 | Stop reason: HOSPADM

## 2019-05-23 RX ORDER — LIDOCAINE 40 MG/G
CREAM TOPICAL
Status: DISCONTINUED | OUTPATIENT
Start: 2019-05-23 | End: 2019-05-23 | Stop reason: HOSPADM

## 2019-05-23 RX ORDER — LIDOCAINE HYDROCHLORIDE 20 MG/ML
INJECTION, SOLUTION INFILTRATION; PERINEURAL PRN
Status: DISCONTINUED | OUTPATIENT
Start: 2019-05-23 | End: 2019-05-23

## 2019-05-23 RX ORDER — PROPOFOL 10 MG/ML
INJECTION, EMULSION INTRAVENOUS PRN
Status: DISCONTINUED | OUTPATIENT
Start: 2019-05-23 | End: 2019-05-23

## 2019-05-23 RX ADMIN — PROPOFOL 20 MG: 10 INJECTION, EMULSION INTRAVENOUS at 08:05

## 2019-05-23 RX ADMIN — PROPOFOL 30 MG: 10 INJECTION, EMULSION INTRAVENOUS at 08:02

## 2019-05-23 RX ADMIN — PROPOFOL 20 MG: 10 INJECTION, EMULSION INTRAVENOUS at 08:06

## 2019-05-23 RX ADMIN — PROPOFOL 10 MG: 10 INJECTION, EMULSION INTRAVENOUS at 08:09

## 2019-05-23 RX ADMIN — MIDAZOLAM 2 MG: 1 INJECTION INTRAMUSCULAR; INTRAVENOUS at 08:00

## 2019-05-23 RX ADMIN — PROPOFOL 10 MG: 10 INJECTION, EMULSION INTRAVENOUS at 08:11

## 2019-05-23 RX ADMIN — SODIUM CHLORIDE, POTASSIUM CHLORIDE, SODIUM LACTATE AND CALCIUM CHLORIDE: 600; 310; 30; 20 INJECTION, SOLUTION INTRAVENOUS at 07:40

## 2019-05-23 RX ADMIN — PROPOFOL 10 MG: 10 INJECTION, EMULSION INTRAVENOUS at 08:13

## 2019-05-23 RX ADMIN — LIDOCAINE HYDROCHLORIDE 40 MG: 20 INJECTION, SOLUTION INFILTRATION; PERINEURAL at 08:02

## 2019-05-23 RX ADMIN — PROPOFOL 30 MG: 10 INJECTION, EMULSION INTRAVENOUS at 08:04

## 2019-05-23 RX ADMIN — PROPOFOL 20 MG: 10 INJECTION, EMULSION INTRAVENOUS at 08:15

## 2019-05-23 ASSESSMENT — MIFFLIN-ST. JEOR: SCORE: 1610.93

## 2019-05-23 NOTE — H&P
HISTORY AND PHYSICAL - ENDOSCOPY   5/23/2019    Patient : Atul Coker    Referring Physician : No ref. provider found    Planned Procedures : Colonoscopy    This is a 64 year old male with a need for a colonoscopy for Screening colonoscopy.      Last colonoscopy : 13 years ago  Family history of colon cancer : NO  Family history of colon polyps : NO  Personal history of colon cancer : NO  Personal history of colon polyps : NO  Rectal bleeding : NO  Changes in bowel habits :NO  Personal history of inflammatory bowel disease : NO    Past Medical History:  Past Medical History:   Diagnosis Date     Allergic rhinitis, cause unspecified 3/5/2013    seasonal     CMV (cytomegalovirus infection) status positive (H)      Other abnormal glucose 3/5/2013    prediabetes     Other nonspecific abnormal serum enzyme levels 3/5/2013     Pure hypercholesterolemia 1/24/2012     Vocal cord nodules        Past Surgical History:  Past Surgical History:   Procedure Laterality Date     COLONOSCOPY  08-    repeat in 10 yrs     HERNIA REPAIR, INGUINAL RT/LT  2013    Right side     UPPER GI ENDOSCOPY       VASECTOMY  2013       Family History History:  Family History   Problem Relation Age of Onset     C.A.D. Father      Myocardial Infarction Father         myocardial infarction     C.A.D. Mother      Other - See Comments Mother         colon polyps     Hypertension Mother      Other - See Comments Brother         end stage liver disease - cause of death     Hypertension Brother      Hypertension Sister        History of Tobacco Use:  History   Smoking Status     Never Smoker   Smokeless Tobacco     Never Used       Current Medications:  No current outpatient medications on file.       Allergies:  Allergies   Allergen Reactions     Seasonal Allergies      Penicillins Rash       ROS:  A comprehensive review of systems was negative.    PHYSICAL EXAM:     Vital signs: /83   Pulse 81   Temp 98  F (36.7  C) (Oral)   Resp 20   " Ht 1.753 m (5' 9\")   Wt 82.6 kg (182 lb)   SpO2 96%   BMI 26.88 kg/m     Weight: [unfilled]   BMI: Body mass index is 26.88 kg/m .   General: Normal, healthy, cooperative, in no acute distress, alert   Skin: no jaundice   HEENT: PERRLA, EOMI and Sclera clear, anicteric   Neck: supple, without adenopathy, full range of motion   Lungs: clear to auscultation   CV: Regular rate and rhythm without murmer   Abdominal: abdomen is soft without significant tenderness, masses, organomegaly or guarding   Extremities: No cyanosis, clubbing or edema noted bilaterally in Upper and Lower Extremities   Neurological: without deficit    Assessment:   64 year old male with need of a colonoscopy for Screening colonoscopy:    Plan:   Will plan on doing a colonoscopy.  The procedures with their risks, benefits and alternatives were explained.  Risks include but are not limited to bleeding, perforation, missing lesions, need for additional procedures, reaction to anesthesia.  All the patients questions were answered.  The patient consents to proceed.  The procedures will be scheduled.      "

## 2019-05-23 NOTE — OR NURSING
Pateint discharged to home.  Navjot score 20. Pain level 0/10.  Discharged from unit via walking.

## 2019-05-23 NOTE — OP NOTE
REPORT OF OPERATION  DATE OF PROCEDURE: 5/23/2019    PATIENT: Atul Coker    SURGERY PREFORMED:   Colonoscopy     with biopsy    without use of cauterized snare    without tattooing    PREOPERATIVE DIAGNOSIS: Screening colonoscopy    POSTOPERATIVE DIAGNOSIS:    Same   Colon polyps, 20 cm   Diverticulosis was identified.   Hemorrhoids  were  identified.    SURGEON: David Groves MD    ASSISTANTS: None    ANESTHESIA: Monitored Anesthesia Care    COMPLICATIONS: None apparent    TRANSFUSIONS: None     TISSUE TO PATHOLOGY: Polyps from 20 cm were sent to pathology for pathological diagnosis.    FINDINGS: Colon polyps, 20 cm.  Diverticulosis was identified.  Hemorrhoids  were  identified.    INDICATIONS: This is a 64 year old male in need of a colonoscopy for Screening colonoscopy.  The patient will be taken to the endoscopy suite for that procedure.    DESCRIPTIONS OF PROCEDURE IN DETAIL: After consent was obtained the patient was taken to the endoscopy suite and placed in the left lateral decubitus position.  The patient was identified and the correct patient was confirmed.  Monitored Anesthesia Care was given.  A time out was preformed verifying the correct patient and the correct procedure.  The entire operative team was in agreement.  All necessary equipment and supplies were in the room.    Rectal exam was preformed and no lesions of the anal canal were noted.  The colonoscope was inserted into the anus and passed without difficulty to the cecum.  The cecum was identified by the ileocecal valve, the coalescence of the tinea and the appendiceal orifice.  Upon withdrawal all walls of the colon were visualized.  Polyps were identified at 20 cm.  These was removed by cold biopsy forceps.  Tattooing their of the location was not done.  Large colon masses and colitis were not seen.colon  Diverticulosis was seen.  Upon reaching the rectum the scope was retroflexed and internal hemorrhoids  were  seen.  The scope  was straightened back out and removed from the patient.  The patient was then taken to the recovery room in stable condition tolerating the procedure well.      Prep: good    Withdrawal time was 8 minutes.    It is recommended that the patient have another colonoscopy in 5 years.

## 2019-05-23 NOTE — ANESTHESIA POSTPROCEDURE EVALUATION
Patient: Atul Coker    Procedure(s):  COLONOSCOPY WITH POLYPECTOMY    Diagnosis:SCREENING  Diagnosis Additional Information: No value filed.    Anesthesia Type:  MAC    Note:  Anesthesia Post Evaluation    Patient location during evaluation: Bedside  Patient participation: Able to fully participate in evaluation  Level of consciousness: awake and alert  Pain management: adequate  Airway patency: patent  Cardiovascular status: acceptable and stable  Respiratory status: acceptable and room air  Hydration status: acceptable  PONV: none     Anesthetic complications: None          Last vitals:  Vitals:    05/23/19 0840 05/23/19 0845 05/23/19 0850   BP: 119/86 122/96 117/80   Pulse:      Resp: 20 20 20   Temp:      SpO2: 97% 97% 95%         Electronically Signed By: SHAHZAD Grover CRNA  May 23, 2019  9:02 AM

## 2019-05-23 NOTE — ANESTHESIA CARE TRANSFER NOTE
Patient: Atul Coker    Procedure(s):  COLONOSCOPY WITH POLYPECTOMY    Diagnosis: SCREENING  Diagnosis Additional Information: No value filed.    Anesthesia Type:   MAC     Note:  Airway :Nasal Cannula  Patient transferred to:Phase II  Handoff Report: Identifed the Patient, Identified the Reponsible Provider, Reviewed the pertinent medical history, Discussed the surgical course, Reviewed Intra-OP anesthesia mangement and issues during anesthesia, Set expectations for post-procedure period and Allowed opportunity for questions and acknowledgement of understanding      Vitals: (Last set prior to Anesthesia Care Transfer)    CRNA VITALS  5/23/2019 0746 - 5/23/2019 0818      5/23/2019             NIBP:  115/73    Pulse:  74    NIBP Mean:  88    Ht Rate:  72    SpO2:  98 %    Resp Rate (set):  8                Electronically Signed By: SHAHZAD Sharma CRNA  May 23, 2019  8:18 AM

## 2019-05-24 LAB — COPATH REPORT: NORMAL

## 2019-05-28 DIAGNOSIS — E78.5 DYSLIPIDEMIA: ICD-10-CM

## 2019-05-29 ENCOUNTER — TRANSFERRED RECORDS (OUTPATIENT)
Dept: HEALTH INFORMATION MANAGEMENT | Facility: CLINIC | Age: 64
End: 2019-05-29

## 2019-05-29 NOTE — TELEPHONE ENCOUNTER
simvastatin  Last Written Prescription Date: 11/27/18  Last Fill Quantity: 90 # of Refills: 1  Last Office Visit: 5/7/19

## 2019-05-30 NOTE — TELEPHONE ENCOUNTER
Protocol failed due to    LDL on file in past 12 months     LDL Cholesterol Calculated   Date Value Ref Range Status   05/25/2018 54 <100 mg/dL Final     Comment:     Desirable:       <100 mg/dl     Please advise. Thank you.

## 2019-05-31 RX ORDER — SIMVASTATIN 20 MG
TABLET ORAL
Qty: 90 TABLET | Refills: 1 | Status: SHIPPED | OUTPATIENT
Start: 2019-05-31 | End: 2019-11-25

## 2019-06-05 ENCOUNTER — TRANSFERRED RECORDS (OUTPATIENT)
Dept: HEALTH INFORMATION MANAGEMENT | Facility: CLINIC | Age: 64
End: 2019-06-05

## 2019-06-06 ENCOUNTER — TELEPHONE (OUTPATIENT)
Dept: FAMILY MEDICINE | Facility: OTHER | Age: 64
End: 2019-06-06

## 2019-06-06 DIAGNOSIS — Z12.5 SCREENING FOR PROSTATE CANCER: ICD-10-CM

## 2019-06-06 DIAGNOSIS — E78.5 DYSLIPIDEMIA: ICD-10-CM

## 2019-06-06 DIAGNOSIS — E78.5 DYSLIPIDEMIA: Primary | ICD-10-CM

## 2019-06-06 LAB
ALBUMIN SERPL-MCNC: 3.7 G/DL (ref 3.4–5)
ALP SERPL-CCNC: 117 U/L (ref 40–150)
ALT SERPL W P-5'-P-CCNC: 56 U/L (ref 0–70)
ANION GAP SERPL CALCULATED.3IONS-SCNC: 9 MMOL/L (ref 3–14)
AST SERPL W P-5'-P-CCNC: 26 U/L (ref 0–45)
BASOPHILS # BLD AUTO: 0.1 10E9/L (ref 0–0.2)
BASOPHILS NFR BLD AUTO: 1.1 %
BILIRUB SERPL-MCNC: 0.5 MG/DL (ref 0.2–1.3)
BUN SERPL-MCNC: 15 MG/DL (ref 7–30)
CALCIUM SERPL-MCNC: 8.9 MG/DL (ref 8.5–10.1)
CHLORIDE SERPL-SCNC: 109 MMOL/L (ref 94–109)
CHOLEST SERPL-MCNC: 198 MG/DL
CO2 SERPL-SCNC: 23 MMOL/L (ref 20–32)
CREAT SERPL-MCNC: 1.13 MG/DL (ref 0.66–1.25)
DIFFERENTIAL METHOD BLD: NORMAL
EOSINOPHIL # BLD AUTO: 0.3 10E9/L (ref 0–0.7)
EOSINOPHIL NFR BLD AUTO: 5.1 %
ERYTHROCYTE [DISTWIDTH] IN BLOOD BY AUTOMATED COUNT: 13.1 % (ref 10–15)
GFR SERPL CREATININE-BSD FRML MDRD: 69 ML/MIN/{1.73_M2}
GLUCOSE SERPL-MCNC: 105 MG/DL (ref 70–99)
HCT VFR BLD AUTO: 47.1 % (ref 40–53)
HDLC SERPL-MCNC: 73 MG/DL
HGB BLD-MCNC: 16.5 G/DL (ref 13.3–17.7)
IMM GRANULOCYTES # BLD: 0 10E9/L (ref 0–0.4)
IMM GRANULOCYTES NFR BLD: 0.3 %
LDLC SERPL CALC-MCNC: 94 MG/DL
LYMPHOCYTES # BLD AUTO: 1.7 10E9/L (ref 0.8–5.3)
LYMPHOCYTES NFR BLD AUTO: 25.4 %
MCH RBC QN AUTO: 32.1 PG (ref 26.5–33)
MCHC RBC AUTO-ENTMCNC: 35 G/DL (ref 31.5–36.5)
MCV RBC AUTO: 92 FL (ref 78–100)
MONOCYTES # BLD AUTO: 0.5 10E9/L (ref 0–1.3)
MONOCYTES NFR BLD AUTO: 7.4 %
NEUTROPHILS # BLD AUTO: 4 10E9/L (ref 1.6–8.3)
NEUTROPHILS NFR BLD AUTO: 60.7 %
NONHDLC SERPL-MCNC: 125 MG/DL
NRBC # BLD AUTO: 0 10*3/UL
NRBC BLD AUTO-RTO: 0 /100
PLATELET # BLD AUTO: 166 10E9/L (ref 150–450)
POTASSIUM SERPL-SCNC: 3.9 MMOL/L (ref 3.4–5.3)
PROT SERPL-MCNC: 7.5 G/DL (ref 6.8–8.8)
PSA SERPL-ACNC: 0.94 UG/L (ref 0–4)
RBC # BLD AUTO: 5.14 10E12/L (ref 4.4–5.9)
SODIUM SERPL-SCNC: 141 MMOL/L (ref 133–144)
TRIGL SERPL-MCNC: 156 MG/DL
TSH SERPL DL<=0.005 MIU/L-ACNC: 1.21 MU/L (ref 0.4–4)
WBC # BLD AUTO: 6.5 10E9/L (ref 4–11)

## 2019-06-06 PROCEDURE — 36415 COLL VENOUS BLD VENIPUNCTURE: CPT | Performed by: FAMILY MEDICINE

## 2019-06-06 PROCEDURE — 80061 LIPID PANEL: CPT | Performed by: FAMILY MEDICINE

## 2019-06-06 PROCEDURE — G0103 PSA SCREENING: HCPCS | Performed by: FAMILY MEDICINE

## 2019-06-06 PROCEDURE — 80050 GENERAL HEALTH PANEL: CPT | Performed by: FAMILY MEDICINE

## 2019-06-06 NOTE — TELEPHONE ENCOUNTER
Patient came in for labs for physical next week. Can you please sign labs as Dr. Carbajal is out of office today.

## 2019-06-11 NOTE — PROGRESS NOTES
SUBJECTIVE:   CC: Atul Coker is an 64 year old male who presents for preventive health visit.     Healthy Habits:    Do you get at least three servings of calcium containing foods daily (dairy, green leafy vegetables, etc.)? yes    Amount of exercise or daily activities, outside of work: 1 day(s) per week    Problems taking medications regularly No    Medication side effects: No    Have you had an eye exam in the past two years? yes    Do you see a dentist twice per year? no    Do you have sleep apnea, excessive snoring or daytime drowsiness?yes, excessive snoring      His recent consultations with Neurology and Retinal Surgery are reviewed.    Today's PHQ-2 Score:   PHQ-2 ( 1999 [employer]) 6/12/2019 5/7/2019   Q1: Little interest or pleasure in doing things 0 0   Q2: Feeling down, depressed or hopeless 0 0   PHQ-2 Score 0 0       Abuse: Current or Past(Physical, Sexual or Emotional)- No  Do you feel safe in your environment? Yes    Social History     Tobacco Use     Smoking status: Never Smoker     Smokeless tobacco: Never Used   Substance Use Topics     Alcohol use: Yes     Comment: occasionally     If you drink alcohol do you typically have >3 drinks per day or >7 drinks per week? No                      Last PSA:   PSA   Date Value Ref Range Status   06/06/2019 0.94 0 - 4 ug/L Final     Comment:     Assay Method:  Chemiluminescence using Siemens Vista analyzer       Reviewed orders with patient. Reviewed health maintenance and updated orders accordingly - Yes  BP Readings from Last 3 Encounters:   06/12/19 124/80   05/23/19 117/80   05/07/19 143/82    Wt Readings from Last 3 Encounters:   06/12/19 84.4 kg (186 lb)   05/23/19 82.6 kg (182 lb)   02/15/19 88.9 kg (196 lb)                  Patient Active Problem List   Diagnosis     GERD (gastroesophageal reflux disease)     CMV (cytomegalovirus) positive (H)     Dyslipidemia     Seasonal allergic rhinitis     Chronic bilateral low back pain without sciatica      Comprehensive Medical Examination     Past Surgical History:   Procedure Laterality Date     COLONOSCOPY  08-    repeat in 10 yrs     COLONOSCOPY N/A 5/23/2019    Procedure: COLONOSCOPY WITH POLYPECTOMY;  Surgeon: David Groves MD;  Location: [facility]     HERNIA REPAIR, INGUINAL RT/LT  2013    Right side     UPPER GI ENDOSCOPY       VASECTOMY  2013       Social History     Tobacco Use     Smoking status: Never Smoker     Smokeless tobacco: Never Used   Substance Use Topics     Alcohol use: Yes     Comment: occasionally     Family History   Problem Relation Age of Onset     C.A.D. Father      Myocardial Infarction Father         myocardial infarction     C.A.D. Mother      Other - See Comments Mother         colon polyps     Hypertension Mother      Other - See Comments Brother         end stage liver disease - cause of death     Hypertension Brother      Hypertension Sister          Current Outpatient Medications   Medication Sig Dispense Refill     ASPIRIN PO Take 81 mg by mouth daily        Azelastine HCl 137 MCG/SPRAY SOLN SPRAY 2 SPRAYS INTO BOTH NOSTRILS 2 TIMES DAILY 30 mL 2     Coenzyme Q10 (COQ10) 200 MG CAPS Take 1 capsule by mouth daily       Famotidine (PEPCID AC MAXIMUM STRENGTH PO) Take 20 mg by mouth daily       fexofenadine (ALLEGRA ALLERGY) 180 MG tablet Take 1 tablet by mouth daily.       guaiFENesin (MUCINEX) 600 MG 12 hr tablet Take 600 mg by mouth daily       multivitamin, therapeutic with minerals (MULTI-VITAMIN) TABS Take 1 tablet by mouth daily       omeprazole (PRILOSEC) 40 MG capsule TAKE 1 CAPSULE (40 MG) BY MOUTH DAILY TAKE 30-60 MINUTES BEFORE A MEAL. 30 capsule 10     order for DME Equipment being ordered: Orthotics 1 Device 0     order for DME Equipment being ordered: Left stirrup brace        DX:M25.572 LEFT ANKLE PAIN 1 Units 1     simvastatin (ZOCOR) 20 MG tablet TAKE 1 TABLET (20 MG) BY MOUTH AT BEDTIME 90 tablet 1     Allergies   Allergen Reactions     Seasonal  Allergies      Penicillins Rash     Recent Labs   Lab Test 06/06/19  0745 05/25/18  0736 02/01/18  0734   LDL 94 54 167*   HDL 73 56 62   TRIG 156* 103 122   ALT 56 136* 38   CR 1.13 1.13 1.11   GFRESTIMATED 69 66 67   GFRESTBLACK 80 79 81   POTASSIUM 3.9 4.0 4.1   TSH 1.21 1.38 1.35        Reviewed and updated as needed this visit by clinical staff  Tobacco  Allergies  Meds  Problems  Med Hx  Surg Hx  Fam Hx         Reviewed and updated as needed this visit by Provider        Past Medical History:   Diagnosis Date     Allergic rhinitis, cause unspecified 3/5/2013    seasonal     CMV (cytomegalovirus infection) status positive (H)      Other abnormal glucose 3/5/2013    prediabetes     Other nonspecific abnormal serum enzyme levels 3/5/2013     Pure hypercholesterolemia 1/24/2012     Vocal cord nodules       Past Surgical History:   Procedure Laterality Date     COLONOSCOPY  08-    repeat in 10 yrs     COLONOSCOPY N/A 5/23/2019    Procedure: COLONOSCOPY WITH POLYPECTOMY;  Surgeon: David Groves MD;  Location: [facility]     HERNIA REPAIR, INGUINAL RT/LT  2013    Right side     UPPER GI ENDOSCOPY       VASECTOMY  2013       ROS:  CONSTITUTIONAL: NEGATIVE for fever, chills, change in weight  INTEGUMENTARY/SKIN: NEGATIVE for worrisome rashes, moles or lesions  EYES: NEGATIVE for vision changes or irritation  ENT: NEGATIVE for ear, mouth and throat problems  RESP: NEGATIVE for significant cough or SOB  CV: NEGATIVE for chest pain, palpitations or peripheral edema  GI: NEGATIVE for nausea, abdominal pain, heartburn, or change in bowel habits   male: negative for dysuria, hematuria, decreased urinary stream, erectile dysfunction, urethral discharge  MUSCULOSKELETAL: NEGATIVE for significant arthralgias or myalgia  NEURO: NEGATIVE for weakness, dizziness or paresthesias  PSYCHIATRIC: NEGATIVE for changes in mood or affect    OBJECTIVE:   /80 (BP Location: Right arm, Patient Position: Sitting, Cuff  "Size: Adult Regular)   Pulse 58   Temp 97.7  F (36.5  C) (Tympanic)   Resp 18   Ht 1.753 m (5' 9\")   Wt 84.4 kg (186 lb)   SpO2 98%   BMI 27.47 kg/m    EXAM:  Physical Exam   Constitutional: He is oriented to person, place, and time. He appears well-developed and well-nourished. No distress.   HENT:   Head: Normocephalic and atraumatic.   Right Ear: External ear normal.   Left Ear: External ear normal.   Nose: Nose normal.   Mouth/Throat: Oropharynx is clear and moist.   Eyes: Pupils are equal, round, and reactive to light. Conjunctivae and EOM are normal.   Neck: Normal range of motion. Neck supple. No JVD present. No thyromegaly present.   Cardiovascular: Normal rate, regular rhythm, normal heart sounds and intact distal pulses. Exam reveals no gallop and no friction rub.   No murmur heard.  Pulmonary/Chest: Effort normal and breath sounds normal. He has no wheezes. He has no rales.   Abdominal: Soft. Bowel sounds are normal. He exhibits no mass. There is no tenderness.   Genitourinary: Rectum normal, prostate normal and penis normal.   Musculoskeletal: Normal range of motion.   Lymphadenopathy:     He has no cervical adenopathy.   Neurological: He is alert and oriented to person, place, and time. He has normal reflexes.   Skin: Skin is warm and dry.   Psychiatric: He has a normal mood and affect.         Diagnostic Test Results:  Labs reviewed in Epic  Results for orders placed or performed in visit on 06/06/19   PSA, screen   Result Value Ref Range    PSA 0.94 0 - 4 ug/L   TSH with free T4 reflex   Result Value Ref Range    TSH 1.21 0.40 - 4.00 mU/L   Lipid Profile   Result Value Ref Range    Cholesterol 198 <200 mg/dL    Triglycerides 156 (H) <150 mg/dL    HDL Cholesterol 73 >39 mg/dL    LDL Cholesterol Calculated 94 <100 mg/dL    Non HDL Cholesterol 125 <130 mg/dL   Comprehensive metabolic panel   Result Value Ref Range    Sodium 141 133 - 144 mmol/L    Potassium 3.9 3.4 - 5.3 mmol/L    Chloride 109 94 " - 109 mmol/L    Carbon Dioxide 23 20 - 32 mmol/L    Anion Gap 9 3 - 14 mmol/L    Glucose 105 (H) 70 - 99 mg/dL    Urea Nitrogen 15 7 - 30 mg/dL    Creatinine 1.13 0.66 - 1.25 mg/dL    GFR Estimate 69 >60 mL/min/[1.73_m2]    GFR Estimate If Black 80 >60 mL/min/[1.73_m2]    Calcium 8.9 8.5 - 10.1 mg/dL    Bilirubin Total 0.5 0.2 - 1.3 mg/dL    Albumin 3.7 3.4 - 5.0 g/dL    Protein Total 7.5 6.8 - 8.8 g/dL    Alkaline Phosphatase 117 40 - 150 U/L    ALT 56 0 - 70 U/L    AST 26 0 - 45 U/L   CBC with platelets differential   Result Value Ref Range    WBC 6.5 4.0 - 11.0 10e9/L    RBC Count 5.14 4.4 - 5.9 10e12/L    Hemoglobin 16.5 13.3 - 17.7 g/dL    Hematocrit 47.1 40.0 - 53.0 %    MCV 92 78 - 100 fl    MCH 32.1 26.5 - 33.0 pg    MCHC 35.0 31.5 - 36.5 g/dL    RDW 13.1 10.0 - 15.0 %    Platelet Count 166 150 - 450 10e9/L    Diff Method Automated Method     % Neutrophils 60.7 %    % Lymphocytes 25.4 %    % Monocytes 7.4 %    % Eosinophils 5.1 %    % Basophils 1.1 %    % Immature Granulocytes 0.3 %    Nucleated RBCs 0 0 /100    Absolute Neutrophil 4.0 1.6 - 8.3 10e9/L    Absolute Lymphocytes 1.7 0.8 - 5.3 10e9/L    Absolute Monocytes 0.5 0.0 - 1.3 10e9/L    Absolute Eosinophils 0.3 0.0 - 0.7 10e9/L    Absolute Basophils 0.1 0.0 - 0.2 10e9/L    Abs Immature Granulocytes 0.0 0 - 0.4 10e9/L    Absolute Nucleated RBC 0.0        ASSESSMENT/PLAN:   1. Comprehensive Medical Examination  Appropriate screening labs are drawn.  Most recent colonoscopy and immunizations are reviewed.  Risk factors, aspirin use, screening recommendations, and follow up discussed.  Routine exam in 1 year.      2. Dyslipidemia  Fasting labs reviewed.  Goals discussed.  Discussed the importance of diet, exercise, and weight reduction.  Follow up 12 months.      3. Vision changes  Reviewed Retinal Surgery notes  - US Carotid Bilateral; Future    4. Cervical radiculopathy  He has recently had EMG which showed   - MR Cervical Spine w/o Contrast; Future    5.  "Central retinal vein occlusion with macular edema of right eye  As above    6. Branch retinal vein occlusion of left eye with macular edema  As above    7. Cervical disc disorder at C6-C7 level with radiculopathy  As above      COUNSELING:  Reviewed preventive health counseling, as reflected in patient instructions  Special attention given to:        Regular exercise       Healthy diet/nutrition    Estimated body mass index is 27.47 kg/m  as calculated from the following:    Height as of this encounter: 1.753 m (5' 9\").    Weight as of this encounter: 84.4 kg (186 lb).    Weight management plan: Discussed healthy diet and exercise guidelines     reports that he has never smoked. He has never used smokeless tobacco.      Counseling Resources:  ATP IV Guidelines  Pooled Cohorts Equation Calculator  FRAX Risk Assessment  ICSI Preventive Guidelines  Dietary Guidelines for Americans, 2010  USDA's MyPlate  ASA Prophylaxis  Lung CA Screening    Jatin Carbajal MD  [facility] - [place]  "

## 2019-06-12 ENCOUNTER — OFFICE VISIT (OUTPATIENT)
Dept: FAMILY MEDICINE | Facility: OTHER | Age: 64
End: 2019-06-12
Attending: FAMILY MEDICINE
Payer: COMMERCIAL

## 2019-06-12 VITALS
SYSTOLIC BLOOD PRESSURE: 124 MMHG | WEIGHT: 186 LBS | DIASTOLIC BLOOD PRESSURE: 80 MMHG | HEIGHT: 69 IN | OXYGEN SATURATION: 98 % | RESPIRATION RATE: 18 BRPM | HEART RATE: 58 BPM | BODY MASS INDEX: 27.55 KG/M2 | TEMPERATURE: 97.7 F

## 2019-06-12 DIAGNOSIS — M54.12 CERVICAL RADICULOPATHY: ICD-10-CM

## 2019-06-12 DIAGNOSIS — E78.2 MIXED HYPERLIPIDEMIA: ICD-10-CM

## 2019-06-12 DIAGNOSIS — H34.8110 CENTRAL RETINAL VEIN OCCLUSION WITH MACULAR EDEMA OF RIGHT EYE (H): ICD-10-CM

## 2019-06-12 DIAGNOSIS — H53.9 VISION CHANGES: ICD-10-CM

## 2019-06-12 DIAGNOSIS — H34.8320 BRANCH RETINAL VEIN OCCLUSION OF LEFT EYE WITH MACULAR EDEMA (H): ICD-10-CM

## 2019-06-12 DIAGNOSIS — M50.123 CERVICAL DISC DISORDER AT C6-C7 LEVEL WITH RADICULOPATHY: ICD-10-CM

## 2019-06-12 DIAGNOSIS — Z00.00 ROUTINE GENERAL MEDICAL EXAMINATION AT A HEALTH CARE FACILITY: Primary | ICD-10-CM

## 2019-06-12 PROCEDURE — 99396 PREV VISIT EST AGE 40-64: CPT | Performed by: FAMILY MEDICINE

## 2019-06-12 ASSESSMENT — PAIN SCALES - GENERAL: PAINLEVEL: NO PAIN (0)

## 2019-06-12 ASSESSMENT — MIFFLIN-ST. JEOR: SCORE: 1629.07

## 2019-06-12 NOTE — NURSING NOTE
"Chief Complaint   Patient presents with     Physical       Initial /80 (BP Location: Right arm, Patient Position: Sitting, Cuff Size: Adult Regular)   Pulse 58   Temp 97.7  F (36.5  C) (Tympanic)   Resp 18   Ht 1.753 m (5' 9\")   Wt 84.4 kg (186 lb)   SpO2 98%   BMI 27.47 kg/m   Estimated body mass index is 27.47 kg/m  as calculated from the following:    Height as of this encounter: 1.753 m (5' 9\").    Weight as of this encounter: 84.4 kg (186 lb).  Medication Reconciliation: complete    Magdalene Estrada LPN  "

## 2019-06-13 PROBLEM — H34.8112: Status: ACTIVE | Noted: 2019-06-13

## 2019-06-13 PROBLEM — M50.123 CERVICAL DISC DISORDER AT C6-C7 LEVEL WITH RADICULOPATHY: Status: ACTIVE | Noted: 2019-06-13

## 2019-06-13 PROBLEM — H34.8320 BRANCH RETINAL VEIN OCCLUSION OF LEFT EYE WITH MACULAR EDEMA (H): Status: ACTIVE | Noted: 2019-06-13

## 2019-06-26 ENCOUNTER — TRANSFERRED RECORDS (OUTPATIENT)
Dept: HEALTH INFORMATION MANAGEMENT | Facility: CLINIC | Age: 64
End: 2019-06-26

## 2019-06-27 ENCOUNTER — HOSPITAL ENCOUNTER (OUTPATIENT)
Dept: ULTRASOUND IMAGING | Facility: HOSPITAL | Age: 64
Discharge: HOME | End: 2019-06-27
Attending: FAMILY MEDICINE | Admitting: FAMILY MEDICINE
Payer: COMMERCIAL

## 2019-06-27 ENCOUNTER — HOSPITAL ENCOUNTER (OUTPATIENT)
Dept: MRI IMAGING | Facility: HOSPITAL | Age: 64
End: 2019-06-27
Attending: FAMILY MEDICINE
Payer: COMMERCIAL

## 2019-06-27 DIAGNOSIS — M54.12 CERVICAL RADICULOPATHY: ICD-10-CM

## 2019-06-27 DIAGNOSIS — H53.9 VISION CHANGES: ICD-10-CM

## 2019-06-27 PROCEDURE — 72141 MRI NECK SPINE W/O DYE: CPT | Mod: TC

## 2019-06-27 PROCEDURE — 93880 EXTRACRANIAL BILAT STUDY: CPT | Mod: TC

## 2019-07-15 ENCOUNTER — OFFICE VISIT (OUTPATIENT)
Dept: FAMILY MEDICINE | Facility: OTHER | Age: 64
End: 2019-07-15
Attending: FAMILY MEDICINE
Payer: COMMERCIAL

## 2019-07-15 VITALS
OXYGEN SATURATION: 96 % | DIASTOLIC BLOOD PRESSURE: 72 MMHG | BODY MASS INDEX: 28.29 KG/M2 | WEIGHT: 191.6 LBS | SYSTOLIC BLOOD PRESSURE: 118 MMHG | HEART RATE: 72 BPM

## 2019-07-15 DIAGNOSIS — M50.123 CERVICAL DISC DISORDER AT C6-C7 LEVEL WITH RADICULOPATHY: Primary | ICD-10-CM

## 2019-07-15 DIAGNOSIS — G56.03 BILATERAL CARPAL TUNNEL SYNDROME: ICD-10-CM

## 2019-07-15 DIAGNOSIS — J30.1 CHRONIC SEASONAL ALLERGIC RHINITIS DUE TO POLLEN: ICD-10-CM

## 2019-07-15 PROCEDURE — 99213 OFFICE O/P EST LOW 20 MIN: CPT | Performed by: FAMILY MEDICINE

## 2019-07-15 RX ORDER — AZELASTINE HYDROCHLORIDE 137 UG/1
SPRAY, METERED NASAL
Qty: 30 ML | Refills: 2 | Status: SHIPPED | OUTPATIENT
Start: 2019-07-15 | End: 2019-10-22

## 2019-07-15 ASSESSMENT — PAIN SCALES - GENERAL: PAINLEVEL: NO PAIN (1)

## 2019-07-15 NOTE — NURSING NOTE
"Chief Complaint   Patient presents with     Results       Initial /72 (BP Location: Left arm, Patient Position: Chair, Cuff Size: Adult Large)   Pulse 72   Wt 86.9 kg (191 lb 9.6 oz)   SpO2 96%   BMI 28.29 kg/m   Estimated body mass index is 28.29 kg/m  as calculated from the following:    Height as of 6/12/19: 1.753 m (5' 9\").    Weight as of this encounter: 86.9 kg (191 lb 9.6 oz).  Medication Reconciliation: complete     Jefferson Diana LPN    "

## 2019-07-15 NOTE — PATIENT INSTRUCTIONS
Appointment with Orthopedics scheduled for evaluation and recommendations for further management.

## 2019-07-15 NOTE — PROGRESS NOTES
Subjective     Atul Coker is a 64 year old male who presents to clinic today for the following health issues:    HPI     Bilataral wrist pain  Eliazar is seen in follow up of neck pain and possible carpal tunnel syndrome. He previously underwent EMG which showed possible cervical disc disease. He underwent MRI which showed only facet degenerative disease. Eliazar was noted to have bilateral carpal tunnel syndrome      Patient would like to talk about tightness behind right knee       Patient Active Problem List   Diagnosis     GERD (gastroesophageal reflux disease)     CMV (cytomegalovirus) positive (H)     Dyslipidemia     Seasonal allergic rhinitis     Chronic bilateral low back pain without sciatica     Comprehensive Medical Examination     Retinal vein occlusion of right eye     Branch retinal vein occlusion of left eye with macular edema     Cervical disc disorder at C6-C7 level with radiculopathy     Past Surgical History:   Procedure Laterality Date     COLONOSCOPY  08-    repeat in 10 yrs     COLONOSCOPY N/A 5/23/2019    Procedure: COLONOSCOPY WITH POLYPECTOMY;  Surgeon: David Groves MD;  Location: [facility]     HERNIA REPAIR, INGUINAL RT/LT  2013    Right side     UPPER GI ENDOSCOPY       VASECTOMY  2013       Social History     Tobacco Use     Smoking status: Never Smoker     Smokeless tobacco: Never Used   Substance Use Topics     Alcohol use: Yes     Comment: occasionally     Family History   Problem Relation Age of Onset     C.A.D. Father      Myocardial Infarction Father         myocardial infarction     C.A.D. Mother      Other - See Comments Mother         colon polyps     Hypertension Mother      Other - See Comments Brother         end stage liver disease - cause of death     Hypertension Brother      Hypertension Sister          Current Outpatient Medications   Medication Sig Dispense Refill     ASPIRIN PO Take 81 mg by mouth daily        Azelastine HCl 137 MCG/SPRAY SOLN SPRAY 2  SPRAYS INTO BOTH NOSTRILS 2 TIMES DAILY 30 mL 2     Coenzyme Q10 (COQ10) 200 MG CAPS Take 1 capsule by mouth daily       Famotidine (PEPCID AC MAXIMUM STRENGTH PO) Take 20 mg by mouth daily       fexofenadine (ALLEGRA ALLERGY) 180 MG tablet Take 1 tablet by mouth daily.       guaiFENesin (MUCINEX) 600 MG 12 hr tablet Take 600 mg by mouth daily       multivitamin, therapeutic with minerals (MULTI-VITAMIN) TABS Take 1 tablet by mouth daily       omeprazole (PRILOSEC) 40 MG capsule TAKE 1 CAPSULE (40 MG) BY MOUTH DAILY TAKE 30-60 MINUTES BEFORE A MEAL. 30 capsule 10     order for DME Equipment being ordered: Orthotics 1 Device 0     order for DME Equipment being ordered: Left stirrup brace        DX:M25.572 LEFT ANKLE PAIN 1 Units 1     simvastatin (ZOCOR) 20 MG tablet TAKE 1 TABLET (20 MG) BY MOUTH AT BEDTIME 90 tablet 1     Allergies   Allergen Reactions     Seasonal Allergies      Penicillins Rash     BP Readings from Last 3 Encounters:   07/15/19 118/72   06/12/19 124/80   05/23/19 117/80    Wt Readings from Last 3 Encounters:   07/15/19 86.9 kg (191 lb 9.6 oz)   06/12/19 84.4 kg (186 lb)   05/23/19 82.6 kg (182 lb)            Reviewed and updated as needed this visit by Provider         Review of Systems   ROS COMP: Constitutional, HEENT, cardiovascular, pulmonary, gi and gu systems are negative, except as otherwise noted.      Objective    /72 (BP Location: Left arm, Patient Position: Chair, Cuff Size: Adult Large)   Pulse 72   Wt 86.9 kg (191 lb 9.6 oz)   SpO2 96%   BMI 28.29 kg/m    Body mass index is 28.29 kg/m .  Physical Exam   Constitutional: He is oriented to person, place, and time. He appears well-developed and well-nourished.   Neurological: He is alert and oriented to person, place, and time.   Psychiatric: He has a normal mood and affect.   Nursing note and vitals reviewed.     Other exam not repeated    Diagnostic Test Results:  Results for orders placed or performed during the hospital  "encounter of 06/27/19   MR Cervical Spine w/o Contrast    Narrative    PROCEDURE: MR CERVICAL SPINE W/O CONTRAST 6/27/2019 12:56 PM    HISTORY: Radiculopathy; Cervical radiculopathy    COMPARISONS: None.    Meds/Dose Given:    TECHNIQUE: Images were obtained sagittally T1 STIR and T2 weighted.  Images were obtained axially T2 weighted gradient echo and 3-D    FINDINGS: The lower brainstem grams cervical junction appear normal.  The C1-C2 articulation appears normal. The C2 C3 C3 C4 C4 C5 C5-C6  discs appear normal.    There is mild annular bulging at C6-C7 without cervical cord or nerve  root compression. C7-T1 disc appears normal.    There are facet joint degenerative changes throughout the cervical  spine most severe at C4-C5 C5-C6 bilaterally.    Intradurally the cervical cord appears normal there are no intradural  abnormalities.    The paravertebral soft tissues appear normal.         Impression    IMPRESSION: Cervical facet joint degenerative change.    QUAN DE LA CRUZ MD           Assessment & Plan     1. Cervical disc disorder at C6-C7 level with radiculopathy  MRI reviewed in detail. No evidence of compression. Will follow.    2. Bilateral carpal tunnel syndrome  Appointment with Orthopedics scheduled for evaluation and recommendations for further management.   - ORTHOPEDICS ADULT REFERRAL     BMI:   Estimated body mass index is 28.29 kg/m  as calculated from the following:    Height as of 6/12/19: 1.753 m (5' 9\").    Weight as of this encounter: 86.9 kg (191 lb 9.6 oz).   Weight management plan: Discussed healthy diet and exercise guidelines        See Patient Instructions    No follow-ups on file.    Jatin Carbajal MD  [facility] - [place]      "

## 2019-08-07 ENCOUNTER — TRANSFERRED RECORDS (OUTPATIENT)
Dept: HEALTH INFORMATION MANAGEMENT | Facility: CLINIC | Age: 64
End: 2019-08-07

## 2019-09-04 ENCOUNTER — TRANSFERRED RECORDS (OUTPATIENT)
Dept: HEALTH INFORMATION MANAGEMENT | Facility: CLINIC | Age: 64
End: 2019-09-04

## 2019-10-22 DIAGNOSIS — J30.1 CHRONIC SEASONAL ALLERGIC RHINITIS DUE TO POLLEN: ICD-10-CM

## 2019-10-25 RX ORDER — AZELASTINE HYDROCHLORIDE 137 UG/1
SPRAY, METERED NASAL
Qty: 30 ML | Refills: 2 | Status: SHIPPED | OUTPATIENT
Start: 2019-10-25 | End: 2020-02-04

## 2019-11-12 DIAGNOSIS — R07.89 ATYPICAL CHEST PAIN: ICD-10-CM

## 2019-11-13 RX ORDER — OMEPRAZOLE 40 MG/1
CAPSULE, DELAYED RELEASE ORAL
Qty: 30 CAPSULE | Refills: 10 | Status: SHIPPED | OUTPATIENT
Start: 2019-11-13 | End: 2020-12-15

## 2019-11-25 DIAGNOSIS — E78.5 DYSLIPIDEMIA: ICD-10-CM

## 2019-11-27 RX ORDER — SIMVASTATIN 20 MG
TABLET ORAL
Qty: 90 TABLET | Refills: 1 | Status: SHIPPED | OUTPATIENT
Start: 2019-11-27 | End: 2020-08-17

## 2020-02-04 NOTE — PROGRESS NOTES
Subjective     Atul Coker is a 65 year old male who presents to clinic today for the following health issues:    HPI   Musculoskeletal problem/pain      Duration: 2 weeks, but has recently stopped     Description  Location: bilateral leg    Intensity:  Mild to moderate    Accompanying signs and symptoms: discoloration of cramping    History  Previous similar problem: no   Previous evaluation:  none    Precipitating or alleviating factors:  Trauma or overuse: no   Aggravating factors include: none    Therapies tried and outcome: nothing    Eliazar has had the symptoms as described above.  He notes mostly cramping and can occur at night. No new medication changes.    Patient Active Problem List   Diagnosis     GERD (gastroesophageal reflux disease)     CMV (cytomegalovirus) positive (H)     Dyslipidemia     Seasonal allergic rhinitis     Chronic bilateral low back pain without sciatica     Comprehensive Medical Examination     Retinal vein occlusion of right eye     Branch retinal vein occlusion of left eye with macular edema     Cervical disc disorder at C6-C7 level with radiculopathy     Past Surgical History:   Procedure Laterality Date     COLONOSCOPY  08-    repeat in 10 yrs     COLONOSCOPY N/A 5/23/2019    Procedure: COLONOSCOPY WITH POLYPECTOMY;  Surgeon: David Groves MD;  Location: [facility]     HERNIA REPAIR, INGUINAL RT/LT  2013    Right side     UPPER GI ENDOSCOPY       VASECTOMY  2013       Social History     Tobacco Use     Smoking status: Never Smoker     Smokeless tobacco: Never Used   Substance Use Topics     Alcohol use: Yes     Comment: occasionally     Family History   Problem Relation Age of Onset     C.A.D. Father      Myocardial Infarction Father         myocardial infarction     C.A.D. Mother      Other - See Comments Mother         colon polyps     Hypertension Mother      Other - See Comments Brother         end stage liver disease - cause of death     Hypertension Brother   "    Hypertension Sister          Current Outpatient Medications   Medication Sig Dispense Refill     ASPIRIN PO Take 81 mg by mouth daily        Azelastine HCl 137 MCG/SPRAY SOLN SPRAY 2 SPRAYS INTO BOTH NOSTRILS 2 TIMES DAILY 30 mL 2     Coenzyme Q10 (COQ10) 200 MG CAPS Take 1 capsule by mouth daily       Famotidine (PEPCID AC MAXIMUM STRENGTH PO) Take 20 mg by mouth daily       fexofenadine (ALLEGRA ALLERGY) 180 MG tablet Take 1 tablet by mouth daily.       guaiFENesin (MUCINEX) 600 MG 12 hr tablet Take 600 mg by mouth daily       multivitamin, therapeutic with minerals (MULTI-VITAMIN) TABS Take 1 tablet by mouth daily       omeprazole (PRILOSEC) 40 MG DR capsule TAKE 1 CAPSULE (40 MG) BY MOUTH DAILY TAKE 30-60 MINUTES BEFORE A MEAL. 30 capsule 10     order for DME Equipment being ordered: Orthotics 1 Device 0     order for DME Equipment being ordered: Left stirrup brace        DX:M25.572 LEFT ANKLE PAIN 1 Units 1     simvastatin (ZOCOR) 20 MG tablet TAKE 1 TABLET (20 MG) BY MOUTH AT BEDTIME 90 tablet 1     Allergies   Allergen Reactions     Seasonal Allergies      Penicillins Rash     BP Readings from Last 3 Encounters:   02/07/20 130/86   07/15/19 118/72   06/12/19 124/80    Wt Readings from Last 3 Encounters:   02/07/20 89.8 kg (198 lb)   07/15/19 86.9 kg (191 lb 9.6 oz)   06/12/19 84.4 kg (186 lb)              Reviewed and updated as needed this visit by Provider         Review of Systems   ROS COMP: Constitutional, HEENT, cardiovascular, pulmonary, gi and gu systems are negative, except as otherwise noted.      Objective    /86 (BP Location: Left arm, Patient Position: Sitting, Cuff Size: Adult Regular)   Pulse 63   Temp 97.1  F (36.2  C) (Tympanic)   Resp 18   Ht 1.753 m (5' 9\")   Wt 89.8 kg (198 lb)   SpO2 98%   BMI 29.24 kg/m    Body mass index is 29.24 kg/m .  Physical Exam  Vitals signs and nursing note reviewed.   Constitutional:       Appearance: He is well-developed.   Neurological:      " "Mental Status: He is alert and oriented to person, place, and time.   Psychiatric:         Mood and Affect: Mood normal.         Behavior: Behavior normal.         Thought Content: Thought content normal.        Other exam not repeated    Diagnostic Test Results:  Labs reviewed in Epic        Assessment & Plan     1. Cramp of limb  Labs drawn.  Discussed nonpharmacological measures. Follow up with persistent symptoms.  - CBC with platelets differential  - Basic metabolic panel  - Magnesium  - vitamin E (TOCOPHEROL) 400 units (360 mg) capsule; Take 1 capsule (400 Units) by mouth 2 times daily  Dispense: 100 capsule; Refill: 3  - ciprofloxacin (CIPRO) 500 MG tablet; Take 1 tablet (500 mg) by mouth 2 times daily  Dispense: 20 tablet; Refill: 0     BMI:   Estimated body mass index is 29.24 kg/m  as calculated from the following:    Height as of this encounter: 1.753 m (5' 9\").    Weight as of this encounter: 89.8 kg (198 lb).   Weight management plan: Discussed healthy diet and exercise guidelines        See Patient Instructions    No follow-ups on file.    Jatin Carbajal MD  [facility] - [place]      "

## 2020-02-07 ENCOUNTER — OFFICE VISIT (OUTPATIENT)
Dept: FAMILY MEDICINE | Facility: OTHER | Age: 65
End: 2020-02-07
Attending: FAMILY MEDICINE
Payer: COMMERCIAL

## 2020-02-07 VITALS
BODY MASS INDEX: 29.33 KG/M2 | HEART RATE: 63 BPM | SYSTOLIC BLOOD PRESSURE: 130 MMHG | TEMPERATURE: 97.1 F | OXYGEN SATURATION: 98 % | HEIGHT: 69 IN | RESPIRATION RATE: 18 BRPM | WEIGHT: 198 LBS | DIASTOLIC BLOOD PRESSURE: 86 MMHG

## 2020-02-07 DIAGNOSIS — R25.2 CRAMP OF LIMB: Primary | ICD-10-CM

## 2020-02-07 LAB
ANION GAP SERPL CALCULATED.3IONS-SCNC: 6 MMOL/L (ref 3–14)
BASOPHILS # BLD AUTO: 0.1 10E9/L (ref 0–0.2)
BASOPHILS NFR BLD AUTO: 1.2 %
BUN SERPL-MCNC: 18 MG/DL (ref 7–30)
CALCIUM SERPL-MCNC: 9.1 MG/DL (ref 8.5–10.1)
CHLORIDE SERPL-SCNC: 110 MMOL/L (ref 94–109)
CO2 SERPL-SCNC: 24 MMOL/L (ref 20–32)
CREAT SERPL-MCNC: 1.01 MG/DL (ref 0.66–1.25)
DIFFERENTIAL METHOD BLD: ABNORMAL
EOSINOPHIL # BLD AUTO: 0.4 10E9/L (ref 0–0.7)
EOSINOPHIL NFR BLD AUTO: 5.8 %
ERYTHROCYTE [DISTWIDTH] IN BLOOD BY AUTOMATED COUNT: 13.2 % (ref 10–15)
GFR SERPL CREATININE-BSD FRML MDRD: 78 ML/MIN/{1.73_M2}
GLUCOSE SERPL-MCNC: 81 MG/DL (ref 70–99)
HCT VFR BLD AUTO: 46.4 % (ref 40–53)
HGB BLD-MCNC: 15.8 G/DL (ref 13.3–17.7)
IMM GRANULOCYTES # BLD: 0 10E9/L (ref 0–0.4)
IMM GRANULOCYTES NFR BLD: 0.3 %
LYMPHOCYTES # BLD AUTO: 2.1 10E9/L (ref 0.8–5.3)
LYMPHOCYTES NFR BLD AUTO: 30.7 %
MAGNESIUM SERPL-MCNC: 2.2 MG/DL (ref 1.6–2.3)
MCH RBC QN AUTO: 31.7 PG (ref 26.5–33)
MCHC RBC AUTO-ENTMCNC: 34.1 G/DL (ref 31.5–36.5)
MCV RBC AUTO: 93 FL (ref 78–100)
MONOCYTES # BLD AUTO: 0.5 10E9/L (ref 0–1.3)
MONOCYTES NFR BLD AUTO: 7.6 %
NEUTROPHILS # BLD AUTO: 3.7 10E9/L (ref 1.6–8.3)
NEUTROPHILS NFR BLD AUTO: 54.4 %
NRBC # BLD AUTO: 0 10*3/UL
NRBC BLD AUTO-RTO: 0 /100
PLATELET # BLD AUTO: 149 10E9/L (ref 150–450)
POTASSIUM SERPL-SCNC: 4.2 MMOL/L (ref 3.4–5.3)
RBC # BLD AUTO: 4.98 10E12/L (ref 4.4–5.9)
SODIUM SERPL-SCNC: 140 MMOL/L (ref 133–144)
WBC # BLD AUTO: 6.7 10E9/L (ref 4–11)

## 2020-02-07 PROCEDURE — 85025 COMPLETE CBC W/AUTO DIFF WBC: CPT | Performed by: FAMILY MEDICINE

## 2020-02-07 PROCEDURE — 99213 OFFICE O/P EST LOW 20 MIN: CPT | Performed by: FAMILY MEDICINE

## 2020-02-07 PROCEDURE — 83735 ASSAY OF MAGNESIUM: CPT | Performed by: FAMILY MEDICINE

## 2020-02-07 PROCEDURE — 80048 BASIC METABOLIC PNL TOTAL CA: CPT | Performed by: FAMILY MEDICINE

## 2020-02-07 PROCEDURE — 36415 COLL VENOUS BLD VENIPUNCTURE: CPT | Performed by: FAMILY MEDICINE

## 2020-02-07 RX ORDER — VITAMIN E 268 MG
400 CAPSULE ORAL 2 TIMES DAILY
Qty: 100 CAPSULE | Refills: 3 | Status: SHIPPED | OUTPATIENT
Start: 2020-02-07 | End: 2022-02-11

## 2020-02-07 RX ORDER — CIPROFLOXACIN 500 MG/1
500 TABLET, FILM COATED ORAL 2 TIMES DAILY
Qty: 20 TABLET | Refills: 0 | Status: SHIPPED | OUTPATIENT
Start: 2020-02-07 | End: 2021-11-12

## 2020-02-07 ASSESSMENT — MIFFLIN-ST. JEOR: SCORE: 1678.5

## 2020-02-07 ASSESSMENT — PATIENT HEALTH QUESTIONNAIRE - PHQ9: SUM OF ALL RESPONSES TO PHQ QUESTIONS 1-9: 0

## 2020-02-07 ASSESSMENT — PAIN SCALES - GENERAL: PAINLEVEL: NO PAIN (0)

## 2020-02-07 NOTE — NURSING NOTE
"Chief Complaint   Patient presents with     Musculoskeletal Problem       Initial /86 (BP Location: Left arm, Patient Position: Sitting, Cuff Size: Adult Regular)   Pulse 63   Temp 97.1  F (36.2  C) (Tympanic)   Resp 18   Ht 1.753 m (5' 9\")   Wt 89.8 kg (198 lb)   SpO2 98%   BMI 29.24 kg/m   Estimated body mass index is 29.24 kg/m  as calculated from the following:    Height as of this encounter: 1.753 m (5' 9\").    Weight as of this encounter: 89.8 kg (198 lb).  Medication Reconciliation: complete  Magdalene Estrada LPN  "

## 2020-02-12 NOTE — PATIENT INSTRUCTIONS
Patient Education     Leg Cramps  A muscle cramp or spasm is a strong contraction of the muscle fibers. It is also called a charley horse. This may occur in the foot, calf, or thigh at night when the legs are elevated. If the spasm is prolonged, it can become very painful. This may be caused by sleeping in an uncomfortable position, muscle fatigue, poor muscle tone from lack of exercise and stretching, dehydration, electrolyte imbalance, diabetes, alcohol use, and certain medicine.  Home care    Drink plenty of fluids during the day to prevent dehydration.    Stretch your legs before bedtime.    Eat a diet high in potassium. These foods include fresh fruit, such as bananas, oranges, cantaloupe, and honeydew melon. It also includes apple, prune, orange, grape and pineapple juices. Other foods high in potassium are white, red, and rodríguez beans, baked potatoes, raw spinach, cod, flounder, halibut, salmon, and scallops.    Talk with your healthcare provider about taking mineral and vitamin supplements that contain magnesium and vitamin B-12 if you are not already taking these. Other prescription medicines may also be used.    Stay away from stimulants such as caffeine, nicotine, and decongestants.  How to relieve an acute leg cramp    For mild pain, getting out of the bed and walking may help. Some people find relief with heat and massage. You can apply heat with a warm shower, bath, or compress. Some people feel better with a cold packs. You can make an ice pack by filling a plastic bag that seals at the top with ice cubes and then wrapping it with a thin towel. Try both and use the method that feels best for 15 to 20 minutes at a time.    For severe pain, stretching the muscle that is in spasm may quickly relieve the pain.    When the spasm is in your foot, your toes may curl up or down. To stretch the muscle in spasm, bend your toes in the opposite direction. If the spasm pulls your toes up, bend them down. If the  spasm pulls them down, bend them up.    When the spasm is in your calf, bend the ankle so the foot points upward toward your knee.    When the spasm is in your thigh, bend or straighten the knee and hip until you feel relief.  Follow-up care  Follow up with your healthcare provider, or as advised.  When to seek medical advice  Call your healthcare provider right away if any of these occur:    Walking makes your pain worse and rest makes it better    You develop weakness in the affected leg    Pain or frequency of spasms increases and is not controlled by the above measures  Date Last Reviewed: 5/1/2018 2000-2019 [employer]. [address] [place] [address], [place] [address]. All rights reserved. This information is not intended as a substitute for professional medical care. Always follow your healthcare professional's instructions.

## 2020-03-30 ENCOUNTER — NURSE TRIAGE (OUTPATIENT)
Dept: FAMILY MEDICINE | Facility: OTHER | Age: 65
End: 2020-03-30

## 2020-03-30 DIAGNOSIS — K04.7 DENTAL ABSCESS: Primary | ICD-10-CM

## 2020-03-30 RX ORDER — CLINDAMYCIN HCL 150 MG
150 CAPSULE ORAL 4 TIMES DAILY
Qty: 40 CAPSULE | Refills: 0 | Status: SHIPPED | OUTPATIENT
Start: 2020-03-30 | End: 2021-11-12

## 2020-03-30 NOTE — TELEPHONE ENCOUNTER
Patient calling and states he has had a fever, 99.6-100.4, since last Thursday. Patient states two weeks ago he had poked his gum behind his upper molar and he had clear drainage and odor. States he has swelling in the gum area. Patient states his dentist is closed for 30 days. Patient denies cough, shortness of breath, runny nose, sore throat, headache, facial or ear pain, tooth pain, nausea, vomiting, or diarrhea. Patient is requesting antibiotics. Patient is willing to do a phone visit if appropriate.     Patient uses [facility] for pharmacy     Patient's phone number is [phone]

## 2020-04-07 ENCOUNTER — TRANSFERRED RECORDS (OUTPATIENT)
Dept: HEALTH INFORMATION MANAGEMENT | Facility: CLINIC | Age: 65
End: 2020-04-07

## 2020-06-03 DIAGNOSIS — J30.1 CHRONIC SEASONAL ALLERGIC RHINITIS DUE TO POLLEN: ICD-10-CM

## 2020-06-04 RX ORDER — AZELASTINE 1 MG/ML
SPRAY, METERED NASAL
Qty: 30 ML | Refills: 2 | Status: SHIPPED | OUTPATIENT
Start: 2020-06-04 | End: 2020-09-18

## 2020-06-21 NOTE — DISCHARGE INSTRUCTIONS
Post-Anesthesia Patient Instructions    IMMEDIATELY FOLLOWING SURGERY:  Do not drive or operate machinery for the first twenty four hours after surgery.  Do not make any important decisions for twenty four hours after surgery or while taking narcotic pain medications or sedatives.  If you develop intractable nausea and vomiting or a severe headache please notify your doctor immediately.    FOLLOW-UP:  Please make an appointment with your surgeon as instructed. You do not need to follow up with anesthesia unless specifically instructed to do so.    WOUND CARE INSTRUCTIONS (if applicable):  Keep a dry clean dressing on the anesthesia/puncture wound site if there is drainage.  Once the wound has quit draining you may leave it open to air.  Generally you should leave the bandage intact for twenty four hours unless there is drainage.  If the epidural site drains for more than 36-48 hours please call the anesthesia department.    QUESTIONS?:  Please feel free to call your physician or the hospital  if you have any questions, and they will be happy to assist you.       INSTRUCTIONS AFTER COLONOSCOPY    WHEN YOU ARE BACK HOME:    Plan to rest for an hour or two after you get home.    You may have some cramping or pressure until you pass gas.    You may resume your regular medications.    Eat a small, light meal at first, and then gradually return to normal meal sizes.    You will be contacted the next day to see how you are doing.  If you had a polyp removed:    Slight bleeding may occur.  You may have a slight blood stain on the toilet paper after a bowel movement.    To lessen the chance of bleeding, avoid heavy exercise for ONE WEEK.  This includes heavy lifting, vigorous sport activities, and heavy physical labor.  You may resume your normal sexual activity.      Avoid aspirin or aspirin products if instructed by your doctor.    If there is a polyp or biopsy, you will be contacted with results within one  week.     WHAT TO WATCH FOR:  Problems rarely occur after the exam; however, it is important for you to watch for early signs of possible problems.  If you have     Unusual pain in your abdomen    Nausea and vomiting that persists    Excessive bleeding    Black or bloody bowel movements    Fever or temperature above 100.6 F  Please call your doctor ([facility] [phone]) or go to the nearest hospital emergency room.   20-Jun-2020 21:40

## 2020-08-14 DIAGNOSIS — E78.5 DYSLIPIDEMIA: ICD-10-CM

## 2020-08-17 RX ORDER — SIMVASTATIN 20 MG
TABLET ORAL
Qty: 90 TABLET | Refills: 1 | Status: SHIPPED | OUTPATIENT
Start: 2020-08-17 | End: 2021-02-08

## 2020-08-17 NOTE — TELEPHONE ENCOUNTER
simvastatin (ZOCOR) 20 MG tablet      Last Written Prescription Date:  11/27/19  Last Fill Quantity: 90,   # refills: 1  Last Office Visit: 7/15/2019  Future Office visit:

## 2020-08-17 NOTE — TELEPHONE ENCOUNTER
simvastatin 20 mg tablet       Routing refill request to provider for review/approval because:  Statins Protocol Failed    LDL on file in past 12 months        PCP: Dr. Carbajal (on Vacation)

## 2020-09-17 DIAGNOSIS — J30.1 CHRONIC SEASONAL ALLERGIC RHINITIS DUE TO POLLEN: ICD-10-CM

## 2020-09-17 NOTE — TELEPHONE ENCOUNTER
azelastine      Last Written Prescription Date:  06/04/2020  Last Fill Quantity: 30 ml,   # refills: 2  Last Office Visit: 02/07/2020  Future Office visit:

## 2020-09-18 RX ORDER — AZELASTINE 1 MG/ML
SPRAY, METERED NASAL
Qty: 30 ML | Refills: 2 | Status: SHIPPED | OUTPATIENT
Start: 2020-09-18 | End: 2020-12-03

## 2020-11-11 ENCOUNTER — OFFICE VISIT (OUTPATIENT)
Dept: FAMILY MEDICINE | Facility: OTHER | Age: 65
End: 2020-11-11
Attending: FAMILY MEDICINE
Payer: COMMERCIAL

## 2020-11-11 VITALS
BODY MASS INDEX: 29.33 KG/M2 | DIASTOLIC BLOOD PRESSURE: 86 MMHG | TEMPERATURE: 98.3 F | OXYGEN SATURATION: 97 % | WEIGHT: 198.6 LBS | SYSTOLIC BLOOD PRESSURE: 122 MMHG | HEART RATE: 72 BPM

## 2020-11-11 DIAGNOSIS — E78.5 DYSLIPIDEMIA: ICD-10-CM

## 2020-11-11 DIAGNOSIS — Z12.11 SPECIAL SCREENING FOR MALIGNANT NEOPLASMS, COLON: ICD-10-CM

## 2020-11-11 DIAGNOSIS — Z00.00 ROUTINE GENERAL MEDICAL EXAMINATION AT A HEALTH CARE FACILITY: Primary | ICD-10-CM

## 2020-11-11 PROCEDURE — 99396 PREV VISIT EST AGE 40-64: CPT | Performed by: FAMILY MEDICINE

## 2020-11-11 ASSESSMENT — PAIN SCALES - GENERAL: PAINLEVEL: NO PAIN (0)

## 2020-11-11 NOTE — NURSING NOTE
"Chief Complaint   Patient presents with     Physical       Initial /86 (BP Location: Right arm, Patient Position: Chair, Cuff Size: Adult Large)   Pulse 72   Temp 98.3  F (36.8  C) (Tympanic)   Wt 90.1 kg (198 lb 9.6 oz)   SpO2 97%   BMI 29.33 kg/m   Estimated body mass index is 29.33 kg/m  as calculated from the following:    Height as of 2/7/20: 1.753 m (5' 9\").    Weight as of this encounter: 90.1 kg (198 lb 9.6 oz).  Medication Reconciliation: complete  Jefferson Diana LPN  "

## 2020-11-11 NOTE — PROGRESS NOTES
3  SUBJECTIVE:   CC: Atul Coker is an 65 year old male who presents for preventive health visit.       Patient has been advised of split billing requirements and indicates understanding: Yes  Healthy Habits:    Do you get at least three servings of calcium containing foods daily (dairy, green leafy vegetables, etc.)? no    Amount of exercise or daily activities, outside of work: 3 day(s) per week    Problems taking medications regularly Yes     Medication side effects: No    Have you had an eye exam in the past two years? yes    Do you see a dentist twice per year? no    Do you have sleep apnea, excessive snoring or daytime drowsiness?yes      Bilateral knee pain right worse which which going down stairs.    Today's PHQ-2 Score:   PHQ-2 ( 1999 [employer]) 6/12/2019 5/7/2019   Q1: Little interest or pleasure in doing things 0 0   Q2: Feeling down, depressed or hopeless 0 0   PHQ-2 Score 0 0       Abuse: Current or Past(Physical, Sexual or Emotional)- No  Do you feel safe in your environment? Yes    Have you ever done Advance Care Planning? (For example, a Health Directive, POLST, or a discussion with a medical provider or your loved ones about your wishes): No, advance care planning information given to patient to review.  Patient declined advance care planning discussion at this time.    Social History     Tobacco Use     Smoking status: Never Smoker     Smokeless tobacco: Never Used   Substance Use Topics     Alcohol use: Yes     Comment: occasionally     If you drink alcohol do you typically have >3 drinks per day or >7 drinks per week? No                      Last PSA:   PSA   Date Value Ref Range Status   06/06/2019 0.94 0 - 4 ug/L Final     Comment:     Assay Method:  Chemiluminescence using Siemens Vista analyzer       Reviewed orders with patient. Reviewed health maintenance and updated orders accordingly - Yes  BP Readings from Last 3 Encounters:   11/11/20 122/86   02/07/20 130/86   07/15/19 118/72     Wt Readings from Last 3 Encounters:   11/11/20 90.1 kg (198 lb 9.6 oz)   02/07/20 89.8 kg (198 lb)   07/15/19 86.9 kg (191 lb 9.6 oz)                  Patient Active Problem List   Diagnosis     GERD (gastroesophageal reflux disease)     CMV (cytomegalovirus) positive (H)     Dyslipidemia     Seasonal allergic rhinitis     Chronic bilateral low back pain without sciatica     Comprehensive Medical Examination     Retinal vein occlusion of right eye     Branch retinal vein occlusion of left eye with macular edema     Cervical disc disorder at C6-C7 level with radiculopathy     Past Surgical History:   Procedure Laterality Date     COLONOSCOPY  08-    repeat in 10 yrs     COLONOSCOPY N/A 5/23/2019    Procedure: COLONOSCOPY WITH POLYPECTOMY;  Surgeon: David Groves MD;  Location: [facility]     HERNIA REPAIR, INGUINAL RT/LT  2013    Right side     UPPER GI ENDOSCOPY       VASECTOMY  2013       Social History     Tobacco Use     Smoking status: Never Smoker     Smokeless tobacco: Never Used   Substance Use Topics     Alcohol use: Yes     Comment: occasionally     Family History   Problem Relation Age of Onset     C.A.D. Father      Myocardial Infarction Father         myocardial infarction     C.A.D. Mother      Other - See Comments Mother         colon polyps     Hypertension Mother      Other - See Comments Brother         end stage liver disease - cause of death     Hypertension Brother      Hypertension Sister          Current Outpatient Medications   Medication Sig Dispense Refill     ASPIRIN PO Take 81 mg by mouth daily        azelastine (ASTELIN) 0.1 % nasal spray SPRAY 2 SPRAYS INTO BOTH NOSTRILS 2 TIMES DAILY 30 mL 2     ciprofloxacin (CIPRO) 500 MG tablet Take 1 tablet (500 mg) by mouth 2 times daily 20 tablet 0     clindamycin (CLEOCIN) 150 MG capsule Take 1 capsule (150 mg) by mouth 4 times daily 40 capsule 0     Coenzyme Q10 (COQ10) 200 MG CAPS Take 1 capsule by mouth daily       Famotidine  (PEPCID AC MAXIMUM STRENGTH PO) Take 20 mg by mouth daily       fexofenadine (ALLEGRA ALLERGY) 180 MG tablet Take 1 tablet by mouth daily.       guaiFENesin (MUCINEX) 600 MG 12 hr tablet Take 600 mg by mouth daily       multivitamin, therapeutic with minerals (MULTI-VITAMIN) TABS Take 1 tablet by mouth daily       omeprazole (PRILOSEC) 40 MG DR capsule TAKE 1 CAPSULE (40 MG) BY MOUTH DAILY TAKE 30-60 MINUTES BEFORE A MEAL. 30 capsule 10     order for DME Equipment being ordered: Orthotics 1 Device 0     order for DME Equipment being ordered: Left stirrup brace        DX:M25.572 LEFT ANKLE PAIN 1 Units 1     simvastatin (ZOCOR) 20 MG tablet TAKE 1 TABLET (20 MG) BY MOUTH AT BEDTIME 90 tablet 1     vitamin E (TOCOPHEROL) 400 units (360 mg) capsule Take 1 capsule (400 Units) by mouth 2 times daily 100 capsule 3     Allergies   Allergen Reactions     Seasonal Allergies      Penicillins Rash     Recent Labs   Lab Test 02/07/20  1540 06/06/19  0745 05/25/18  0736 02/01/18  0734   LDL  --  94 54 167*   HDL  --  73 56 62   TRIG  --  156* 103 122   ALT  --  56 136* 38   CR 1.01 1.13 1.13 1.11   GFRESTIMATED 78 69 66 67   GFRESTBLACK >90 80 79 81   POTASSIUM 4.2 3.9 4.0 4.1   TSH  --  1.21 1.38 1.35        Reviewed and updated as needed this visit by clinical staff  Tobacco  Allergies  Meds   Med Hx  Surg Hx  Fam Hx  Soc Hx        Reviewed and updated as needed this visit by Provider                Past Medical History:   Diagnosis Date     Allergic rhinitis, cause unspecified 3/5/2013    seasonal     CMV (cytomegalovirus infection) status positive (H)      Other abnormal glucose 3/5/2013    prediabetes     Other nonspecific abnormal serum enzyme levels 3/5/2013     Pure hypercholesterolemia 1/24/2012     Vocal cord nodules       Past Surgical History:   Procedure Laterality Date     COLONOSCOPY  08-    repeat in 10 yrs     COLONOSCOPY N/A 5/23/2019    Procedure: COLONOSCOPY WITH POLYPECTOMY;  Surgeon: Germain  David Cardona MD;  Location: [facility]     HERNIA REPAIR, INGUINAL RT/LT  2013    Right side     UPPER GI ENDOSCOPY       VASECTOMY  2013       ROS:  CONSTITUTIONAL: NEGATIVE for fever, chills, change in weight  INTEGUMENTARY/SKIN: NEGATIVE for worrisome rashes, moles or lesions  EYES: NEGATIVE for vision changes or irritation  ENT: NEGATIVE for ear, mouth and throat problems  RESP: NEGATIVE for significant cough or SOB  CV: NEGATIVE for chest pain, palpitations or peripheral edema  GI: NEGATIVE for nausea, abdominal pain, heartburn, or change in bowel habits   male: negative for dysuria, hematuria, decreased urinary stream, erectile dysfunction, urethral discharge  MUSCULOSKELETAL: NEGATIVE for significant arthralgias or myalgia  NEURO: NEGATIVE for weakness, dizziness or paresthesias  PSYCHIATRIC: NEGATIVE for changes in mood or affect    OBJECTIVE:   /86 (BP Location: Right arm, Patient Position: Chair, Cuff Size: Adult Large)   Pulse 72   Temp 98.3  F (36.8  C) (Tympanic)   Wt 90.1 kg (198 lb 9.6 oz)   SpO2 97%   BMI 29.33 kg/m    EXAM:  GENERAL: healthy, alert and no distress  EYES: Eyes grossly normal to inspection, PERRL and conjunctivae and sclerae normal  HENT: ear canals and TM's normal, nose and mouth without ulcers or lesions  NECK: no adenopathy, no asymmetry, masses, or scars and thyroid normal to palpation  RESP: lungs clear to auscultation - no rales, rhonchi or wheezes  CV: regular rate and rhythm, normal S1 S2, no S3 or S4, no murmur, click or rub, no peripheral edema and peripheral pulses strong  ABDOMEN: soft, nontender, no hepatosplenomegaly, no masses and bowel sounds normal  MS: no gross musculoskeletal defects noted, no edema  SKIN: no suspicious lesions or rashes  NEURO: Normal strength and tone, mentation intact and speech normal  PSYCH: mentation appears normal, affect normal/bright    Diagnostic Test Results:  Labs reviewed in Epic    ASSESSMENT/PLAN:   1. Routine general  "medical examination at a health care facility  Appropriate screening labs are drawn.  Most recent colonoscopy and immunizations are reviewed.  Risk factors, aspirin use, screening recommendations, and follow up discussed.  Routine exam in 1 year.      2. Dyslipidemia  Fasting labs reviewed.  Goals discussed.  Discussed the importance of diet, exercise, and weight reduction.  Follow up 12 months.   - CBC with platelets differential; Future  - Comprehensive metabolic panel; Future  - Lipid Profile; Future  - TSH with free T4 reflex; Future    3. Special screening for malignant neoplasms, colon  PSA drawn  - PSA, screen; Future    Patient has been advised of split billing requirements and indicates understanding: Yes  COUNSELING:  Reviewed preventive health counseling, as reflected in patient instructions  Special attention given to:     Estimated body mass index is 29.33 kg/m  as calculated from the following:    Height as of 2/7/20: 1.753 m (5' 9\").    Weight as of this encounter: 90.1 kg (198 lb 9.6 oz).    Weight management plan: Discussed healthy diet and exercise guidelines    He reports that he has never smoked. He has never used smokeless tobacco.      Counseling Resources:  ATP IV Guidelines  Pooled Cohorts Equation Calculator  FRAX Risk Assessment  ICSI Preventive Guidelines  Dietary Guidelines for Americans, 2010  USDA's MyPlate  ASA Prophylaxis  Lung CA Screening    Jatin Carbajal MD  [facility] - [place]  "

## 2020-11-16 DIAGNOSIS — E78.5 DYSLIPIDEMIA: ICD-10-CM

## 2020-11-16 DIAGNOSIS — Z12.11 SPECIAL SCREENING FOR MALIGNANT NEOPLASMS, COLON: ICD-10-CM

## 2020-11-16 LAB
ALBUMIN SERPL-MCNC: 3.9 G/DL (ref 3.4–5)
ALP SERPL-CCNC: 112 U/L (ref 40–150)
ALT SERPL W P-5'-P-CCNC: 29 U/L (ref 0–70)
ANION GAP SERPL CALCULATED.3IONS-SCNC: 4 MMOL/L (ref 3–14)
AST SERPL W P-5'-P-CCNC: 20 U/L (ref 0–45)
BASOPHILS # BLD AUTO: 0.1 10E9/L (ref 0–0.2)
BASOPHILS NFR BLD AUTO: 0.9 %
BILIRUB SERPL-MCNC: 0.5 MG/DL (ref 0.2–1.3)
BUN SERPL-MCNC: 13 MG/DL (ref 7–30)
CALCIUM SERPL-MCNC: 9.1 MG/DL (ref 8.5–10.1)
CHLORIDE SERPL-SCNC: 109 MMOL/L (ref 94–109)
CHOLEST SERPL-MCNC: 219 MG/DL
CO2 SERPL-SCNC: 27 MMOL/L (ref 20–32)
CREAT SERPL-MCNC: 1.16 MG/DL (ref 0.66–1.25)
DIFFERENTIAL METHOD BLD: NORMAL
EOSINOPHIL # BLD AUTO: 0.4 10E9/L (ref 0–0.7)
EOSINOPHIL NFR BLD AUTO: 5.9 %
ERYTHROCYTE [DISTWIDTH] IN BLOOD BY AUTOMATED COUNT: 14.2 % (ref 10–15)
GFR SERPL CREATININE-BSD FRML MDRD: 66 ML/MIN/{1.73_M2}
GLUCOSE SERPL-MCNC: 105 MG/DL (ref 70–99)
HCT VFR BLD AUTO: 46.8 % (ref 40–53)
HDLC SERPL-MCNC: 73 MG/DL
HGB BLD-MCNC: 16 G/DL (ref 13.3–17.7)
IMM GRANULOCYTES # BLD: 0 10E9/L (ref 0–0.4)
IMM GRANULOCYTES NFR BLD: 0.3 %
LDLC SERPL CALC-MCNC: 110 MG/DL
LYMPHOCYTES # BLD AUTO: 1.7 10E9/L (ref 0.8–5.3)
LYMPHOCYTES NFR BLD AUTO: 26.6 %
MCH RBC QN AUTO: 30.2 PG (ref 26.5–33)
MCHC RBC AUTO-ENTMCNC: 34.2 G/DL (ref 31.5–36.5)
MCV RBC AUTO: 89 FL (ref 78–100)
MONOCYTES # BLD AUTO: 0.5 10E9/L (ref 0–1.3)
MONOCYTES NFR BLD AUTO: 8.4 %
NEUTROPHILS # BLD AUTO: 3.7 10E9/L (ref 1.6–8.3)
NEUTROPHILS NFR BLD AUTO: 57.9 %
NONHDLC SERPL-MCNC: 146 MG/DL
NRBC # BLD AUTO: 0 10*3/UL
NRBC BLD AUTO-RTO: 0 /100
PLATELET # BLD AUTO: 169 10E9/L (ref 150–450)
POTASSIUM SERPL-SCNC: 4.4 MMOL/L (ref 3.4–5.3)
PROT SERPL-MCNC: 8.1 G/DL (ref 6.8–8.8)
PSA SERPL-ACNC: 0.89 UG/L (ref 0–4)
RBC # BLD AUTO: 5.29 10E12/L (ref 4.4–5.9)
SODIUM SERPL-SCNC: 140 MMOL/L (ref 133–144)
TRIGL SERPL-MCNC: 178 MG/DL
TSH SERPL DL<=0.005 MIU/L-ACNC: 1.4 MU/L (ref 0.4–4)
WBC # BLD AUTO: 6.4 10E9/L (ref 4–11)

## 2020-11-16 PROCEDURE — 80061 LIPID PANEL: CPT | Performed by: FAMILY MEDICINE

## 2020-11-16 PROCEDURE — G0103 PSA SCREENING: HCPCS | Performed by: FAMILY MEDICINE

## 2020-11-16 PROCEDURE — 80050 GENERAL HEALTH PANEL: CPT | Performed by: FAMILY MEDICINE

## 2020-11-16 PROCEDURE — 36415 COLL VENOUS BLD VENIPUNCTURE: CPT | Performed by: FAMILY MEDICINE

## 2020-12-03 DIAGNOSIS — J30.1 CHRONIC SEASONAL ALLERGIC RHINITIS DUE TO POLLEN: ICD-10-CM

## 2020-12-03 RX ORDER — AZELASTINE 1 MG/ML
SPRAY, METERED NASAL
Qty: 30 ML | Refills: 2 | Status: SHIPPED | OUTPATIENT
Start: 2020-12-03 | End: 2021-03-19

## 2020-12-03 NOTE — TELEPHONE ENCOUNTER
Astelin 0.1% nasal spray      Last Written Prescription Date:  9/18/20  Last Fill Quantity: 30 mL,   # refills: 2  Last Office Visit: 11/11/20  Future Office visit:       Routing refill request to provider for review/approval because:

## 2020-12-15 DIAGNOSIS — R07.89 ATYPICAL CHEST PAIN: ICD-10-CM

## 2020-12-15 RX ORDER — OMEPRAZOLE 40 MG/1
CAPSULE, DELAYED RELEASE ORAL
Qty: 30 CAPSULE | Refills: 10 | Status: SHIPPED | OUTPATIENT
Start: 2020-12-15 | End: 2022-01-05

## 2020-12-15 NOTE — TELEPHONE ENCOUNTER
omeprazole (PRILOSEC) 40 MG DR capsule     Last Written Prescription Date: 11/13/2019  Last Fill Quantity: 30,   # refills: 10  Last Office Visit: 11/11/20  Future Office visit:       Routing refill request to provider for review/approval

## 2021-02-08 DIAGNOSIS — E78.5 DYSLIPIDEMIA: ICD-10-CM

## 2021-02-08 RX ORDER — SIMVASTATIN 20 MG
TABLET ORAL
Qty: 90 TABLET | Refills: 1 | Status: SHIPPED | OUTPATIENT
Start: 2021-02-08 | End: 2021-05-21

## 2021-02-08 NOTE — TELEPHONE ENCOUNTER
Zocor  20mg      Last Written Prescription Date:  8/17/20  Last Fill Quantity: 90,   # refills: 1  Last Office Visit: 11/11/20  Future Office visit:       Routing refill request to provider for review/approval because:

## 2021-03-18 DIAGNOSIS — J30.1 CHRONIC SEASONAL ALLERGIC RHINITIS DUE TO POLLEN: ICD-10-CM

## 2021-03-18 NOTE — TELEPHONE ENCOUNTER
Astelin 0.1% nasal spray       Last Written Prescription Date:  12/3/20  Last Fill Quantity: 30ml,   # refills: 2  Last Office Visit: 11/11/20  Future Office visit:       Routing refill request to provider for review/approval because:

## 2021-03-19 RX ORDER — AZELASTINE 1 MG/ML
SPRAY, METERED NASAL
Qty: 30 ML | Refills: 2 | Status: SHIPPED | OUTPATIENT
Start: 2021-03-19 | End: 2021-06-30

## 2021-05-21 DIAGNOSIS — E78.5 DYSLIPIDEMIA: ICD-10-CM

## 2021-05-21 RX ORDER — SIMVASTATIN 20 MG
TABLET ORAL
Qty: 90 TABLET | Refills: 1 | Status: SHIPPED | OUTPATIENT
Start: 2021-05-21 | End: 2022-01-05

## 2021-05-21 NOTE — TELEPHONE ENCOUNTER
Zocor      Last Written Prescription Date:  2.8.21  Last Fill Quantity: 90,   # refills: 1  Last Office Visit: 11.11.2020

## 2021-06-28 DIAGNOSIS — J30.1 CHRONIC SEASONAL ALLERGIC RHINITIS DUE TO POLLEN: ICD-10-CM

## 2021-06-29 NOTE — TELEPHONE ENCOUNTER
Astelin 0.1% nasal spray        Last Written Prescription Date:  3/19/21  Last Fill Quantity: 30ml,   # refills: 2  Last Office Visit: 11/11/20  Future Office visit:       Routing refill request to provider for review/approval because:

## 2021-06-30 RX ORDER — AZELASTINE 1 MG/ML
SPRAY, METERED NASAL
Qty: 30 ML | Refills: 2 | Status: SHIPPED | OUTPATIENT
Start: 2021-06-30 | End: 2021-10-11

## 2021-10-11 DIAGNOSIS — J30.1 CHRONIC SEASONAL ALLERGIC RHINITIS DUE TO POLLEN: ICD-10-CM

## 2021-10-11 RX ORDER — AZELASTINE 1 MG/ML
SPRAY, METERED NASAL
Qty: 30 ML | Refills: 2 | Status: SHIPPED | OUTPATIENT
Start: 2021-10-11 | End: 2022-02-16

## 2021-10-11 NOTE — TELEPHONE ENCOUNTER
Astelin      Last Written Prescription Date:  06/30/21  Last Fill Quantity: 30ml,   # refills: 2  Last Office Visit: 11/11/20  Future Office visit:

## 2021-10-11 NOTE — TELEPHONE ENCOUNTER
Attempt # 1  Outcome: Talked with Patient    Comment: Patient will call back to schedule medication review and fasting labs with Dr. Carbajal

## 2021-11-12 ENCOUNTER — OFFICE VISIT (OUTPATIENT)
Dept: FAMILY MEDICINE | Facility: OTHER | Age: 66
End: 2021-11-12
Attending: FAMILY MEDICINE
Payer: MEDICARE

## 2021-11-12 VITALS
OXYGEN SATURATION: 97 % | SYSTOLIC BLOOD PRESSURE: 128 MMHG | TEMPERATURE: 97.8 F | BODY MASS INDEX: 28.06 KG/M2 | DIASTOLIC BLOOD PRESSURE: 78 MMHG | WEIGHT: 190 LBS | RESPIRATION RATE: 18 BRPM | HEART RATE: 79 BPM

## 2021-11-12 DIAGNOSIS — Z12.5 SCREENING FOR PROSTATE CANCER: ICD-10-CM

## 2021-11-12 DIAGNOSIS — K21.9 GASTROESOPHAGEAL REFLUX DISEASE WITHOUT ESOPHAGITIS: ICD-10-CM

## 2021-11-12 DIAGNOSIS — E78.2 MIXED HYPERLIPIDEMIA: Primary | ICD-10-CM

## 2021-11-12 LAB
ALBUMIN SERPL-MCNC: 3.7 G/DL (ref 3.4–5)
ALP SERPL-CCNC: 128 U/L (ref 40–150)
ALT SERPL W P-5'-P-CCNC: 43 U/L (ref 0–70)
ANION GAP SERPL CALCULATED.3IONS-SCNC: 5 MMOL/L (ref 3–14)
AST SERPL W P-5'-P-CCNC: 22 U/L (ref 0–45)
BASOPHILS # BLD AUTO: 0.1 10E3/UL (ref 0–0.2)
BASOPHILS NFR BLD AUTO: 1 %
BILIRUB SERPL-MCNC: 0.7 MG/DL (ref 0.2–1.3)
BUN SERPL-MCNC: 14 MG/DL (ref 7–30)
CALCIUM SERPL-MCNC: 9.2 MG/DL (ref 8.5–10.1)
CHLORIDE BLD-SCNC: 110 MMOL/L (ref 94–109)
CHOLEST SERPL-MCNC: 218 MG/DL
CO2 SERPL-SCNC: 23 MMOL/L (ref 20–32)
CREAT SERPL-MCNC: 1.13 MG/DL (ref 0.66–1.25)
EOSINOPHIL # BLD AUTO: 0.2 10E3/UL (ref 0–0.7)
EOSINOPHIL NFR BLD AUTO: 3 %
ERYTHROCYTE [DISTWIDTH] IN BLOOD BY AUTOMATED COUNT: 12.6 % (ref 10–15)
FASTING STATUS PATIENT QL REPORTED: NO
GFR SERPL CREATININE-BSD FRML MDRD: 68 ML/MIN/1.73M2
GLUCOSE BLD-MCNC: 90 MG/DL (ref 70–99)
HCT VFR BLD AUTO: 47.4 % (ref 40–53)
HDLC SERPL-MCNC: 70 MG/DL
HGB BLD-MCNC: 16.2 G/DL (ref 13.3–17.7)
IMM GRANULOCYTES # BLD: 0 10E3/UL
IMM GRANULOCYTES NFR BLD: 0 %
LDLC SERPL CALC-MCNC: 121 MG/DL
LYMPHOCYTES # BLD AUTO: 1.5 10E3/UL (ref 0.8–5.3)
LYMPHOCYTES NFR BLD AUTO: 21 %
MCH RBC QN AUTO: 32.2 PG (ref 26.5–33)
MCHC RBC AUTO-ENTMCNC: 34.2 G/DL (ref 31.5–36.5)
MCV RBC AUTO: 94 FL (ref 78–100)
MONOCYTES # BLD AUTO: 0.5 10E3/UL (ref 0–1.3)
MONOCYTES NFR BLD AUTO: 7 %
NEUTROPHILS # BLD AUTO: 4.8 10E3/UL (ref 1.6–8.3)
NEUTROPHILS NFR BLD AUTO: 68 %
NONHDLC SERPL-MCNC: 148 MG/DL
NRBC # BLD AUTO: 0 10E3/UL
NRBC BLD AUTO-RTO: 0 /100
PLATELET # BLD AUTO: 171 10E3/UL (ref 150–450)
POTASSIUM BLD-SCNC: 4.2 MMOL/L (ref 3.4–5.3)
PROT SERPL-MCNC: 7.9 G/DL (ref 6.8–8.8)
PSA SERPL-MCNC: 1.13 UG/L (ref 0–4)
RBC # BLD AUTO: 5.03 10E6/UL (ref 4.4–5.9)
SODIUM SERPL-SCNC: 138 MMOL/L (ref 133–144)
TRIGL SERPL-MCNC: 136 MG/DL
TSH SERPL DL<=0.005 MIU/L-ACNC: 1.18 MU/L (ref 0.4–4)
WBC # BLD AUTO: 7.1 10E3/UL (ref 4–11)

## 2021-11-12 PROCEDURE — G0463 HOSPITAL OUTPT CLINIC VISIT: HCPCS | Mod: 25

## 2021-11-12 PROCEDURE — 80061 LIPID PANEL: CPT | Mod: ZL | Performed by: FAMILY MEDICINE

## 2021-11-12 PROCEDURE — 84443 ASSAY THYROID STIM HORMONE: CPT | Mod: ZL | Performed by: FAMILY MEDICINE

## 2021-11-12 PROCEDURE — G0463 HOSPITAL OUTPT CLINIC VISIT: HCPCS

## 2021-11-12 PROCEDURE — 82040 ASSAY OF SERUM ALBUMIN: CPT | Mod: ZL | Performed by: FAMILY MEDICINE

## 2021-11-12 PROCEDURE — 99214 OFFICE O/P EST MOD 30 MIN: CPT | Performed by: FAMILY MEDICINE

## 2021-11-12 PROCEDURE — G0103 PSA SCREENING: HCPCS | Mod: ZL | Performed by: FAMILY MEDICINE

## 2021-11-12 PROCEDURE — 85025 COMPLETE CBC W/AUTO DIFF WBC: CPT | Mod: ZL | Performed by: FAMILY MEDICINE

## 2021-11-12 PROCEDURE — 36415 COLL VENOUS BLD VENIPUNCTURE: CPT | Mod: ZL | Performed by: FAMILY MEDICINE

## 2021-11-12 RX ORDER — ROSUVASTATIN CALCIUM 10 MG/1
10 TABLET, COATED ORAL DAILY
Qty: 90 TABLET | Refills: 1 | Status: SHIPPED | OUTPATIENT
Start: 2021-11-12 | End: 2022-01-05

## 2021-11-12 NOTE — PROGRESS NOTES
SUBJECTIVE:   Atul Coker is a 66 year old male who presents to clinic today for the following health issues:    Hyperlipidemia Follow-Up      Are you regularly taking any medication or supplement to lower your cholesterol?   Yes- zocor    Are you having muscle aches or other side effects that you think could be caused by your cholesterol lowering medication?  Yes- muscle cramping      How many servings of fruits and vegetables do you eat daily?  2-3    On average, how many sweetened beverages do you drink each day (Examples: soda, juice, sweet tea, etc.  Do NOT count diet or artificially sweetened beverages)?   2    How many days per week do you exercise enough to make your heart beat faster? 5    How many minutes a day do you exercise enough to make your heart beat faster? 10 - 19    How many days per week do you miss taking your medication? 0      Bilateral carpal tunnel syndrome   Eliazar has a history of bilateral carpal tunnel syndrome and has previously underwent release.  He continues to have intermittent numbness..  He has been wearing his wrist splints.  Requests replacement.    Problem list and histories reviewed & adjusted, as indicated.  Additional history: as documented    Patient Active Problem List   Diagnosis     GERD (gastroesophageal reflux disease)     CMV (cytomegalovirus) positive ([facility])     Dyslipidemia     Seasonal allergic rhinitis     Chronic bilateral low back pain without sciatica     Comprehensive Medical Examination     Retinal vein occlusion of right eye     Branch retinal vein occlusion of left eye with macular edema     Cervical disc disorder at C6-C7 level with radiculopathy     Past Surgical History:   Procedure Laterality Date     COLONOSCOPY  08-    repeat in 10 yrs     COLONOSCOPY N/A 5/23/2019    Procedure: COLONOSCOPY WITH POLYPECTOMY;  Surgeon: David Groves MD;  Location: [facility]     HERNIA REPAIR, INGUINAL RT/LT  2013    Right side     UPPER GI ENDOSCOPY        VASECTOMY  2013       Social History     Tobacco Use     Smoking status: Never Smoker     Smokeless tobacco: Never Used   Substance Use Topics     Alcohol use: Yes     Comment: occasionally     Family History   Problem Relation Age of Onset     C.A.D. Father      Myocardial Infarction Father         myocardial infarction     C.A.D. Mother      Other - See Comments Mother         colon polyps     Hypertension Mother      Other - See Comments Brother         end stage liver disease - cause of death     Hypertension Brother      Hypertension Sister          Current Outpatient Medications   Medication Sig Dispense Refill     azelastine (ASTELIN) 0.1 % nasal spray SPRAY 2 SPRAYS INTO BOTH NOSTRILS 2 TIMES DAILY 30 mL 2     Famotidine (PEPCID AC MAXIMUM STRENGTH PO) Take 20 mg by mouth daily       fexofenadine (ALLEGRA ALLERGY) 180 MG tablet Take 1 tablet by mouth daily.       order for DME Equipment being ordered: Orthotics 1 Device 0     order for DME Equipment being ordered: Left stirrup brace        DX:M25.572 LEFT ANKLE PAIN 1 Units 1     simvastatin (ZOCOR) 20 MG tablet TAKE 1 TABLET (20 MG) BY MOUTH AT BEDTIME 90 tablet 1     ASPIRIN PO Take 81 mg by mouth daily  (Patient not taking: Reported on 11/12/2021)       Coenzyme Q10 (COQ10) 200 MG CAPS Take 1 capsule by mouth daily (Patient not taking: Reported on 11/12/2021)       guaiFENesin (MUCINEX) 600 MG 12 hr tablet Take 600 mg by mouth daily (Patient not taking: Reported on 11/12/2021)       multivitamin, therapeutic with minerals (MULTI-VITAMIN) TABS Take 1 tablet by mouth daily (Patient not taking: Reported on 11/12/2021)       omeprazole (PRILOSEC) 40 MG DR capsule TAKE 1 CAPSULE (40 MG) BY MOUTH DAILY TAKE 30-60 MINUTES BEFORE A MEAL. (Patient not taking: Reported on 11/12/2021) 30 capsule 10     vitamin E (TOCOPHEROL) 400 units (360 mg) capsule Take 1 capsule (400 Units) by mouth 2 times daily (Patient not taking: Reported on 11/12/2021) 100 capsule 3      Allergies   Allergen Reactions     Seasonal Allergies      Penicillins Rash     Recent Labs   Lab Test 11/16/20  0916 02/07/20  1540 06/06/19  0745 05/25/18  0736   *  --  94 54   HDL 73  --  73 56   TRIG 178*  --  156* 103   ALT 29  --  56 136*   CR 1.16 1.01 1.13 1.13   GFRESTIMATED 66 78 69 66   GFRESTBLACK 76 >90 80 79   POTASSIUM 4.4 4.2 3.9 4.0   TSH 1.40  --  1.21 1.38      BP Readings from Last 3 Encounters:   11/12/21 128/78   11/11/20 122/86   02/07/20 130/86    Wt Readings from Last 3 Encounters:   11/12/21 86.2 kg (190 lb)   11/11/20 90.1 kg (198 lb 9.6 oz)   02/07/20 89.8 kg (198 lb)                    Reviewed and updated as needed this visit by clinical staff  Tobacco  Allergies  Meds   Med Hx  Surg Hx  Fam Hx  Soc Hx     Reviewed and updated as needed this visit by Provider                ROS:  Constitutional, HEENT, cardiovascular, pulmonary, gi and gu systems are negative, except as otherwise noted.    OBJECTIVE:     /78 (BP Location: Left arm, Patient Position: Sitting, Cuff Size: Adult Regular)   Pulse 79   Temp 97.8  F (36.6  C) (Tympanic)   Resp 18   Wt 86.2 kg (190 lb)   SpO2 97%   BMI 28.06 kg/m    Body mass index is 28.06 kg/m .  Physical Exam  Constitutional:       General: He is not in acute distress.     Appearance: Normal appearance. He is well-developed.   Neurological:      Mental Status: He is alert and oriented to person, place, and time.   Psychiatric:         Mood and Affect: Mood normal.         Behavior: Behavior normal.         Other exam not repeated.  Diagnostic Test Results:  none     ASSESSMENT/PLAN:     Dyslipidemia  He has previously been on simvastatin (Zocor).  Eliazar has noted muscle cramping.  Will switch to Crestor as written.  Labs drawn. He will follow up with Primary Care Provider for Comprehensive Medical Examination   - CBC with Platelets & Differential; Future  - Comprehensive metabolic panel; Future  - Lipid Profile; Future  - TSH  with free T4 reflex; Future  - rosuvastatin (CRESTOR) 10 MG tablet; Take 1 tablet (10 mg) by mouth daily  - CBC with Platelets & Differential  - Comprehensive metabolic panel  - Lipid Profile  - TSH with free T4 reflex    GERD (gastroesophageal reflux disease)  Stable with dietary changes.  He occasional uses omeprazole.  Will follow.      Screening for prostate cancer  PSA drawn.  - Prostate Specific Antigen Screen; Future  - Prostate Specific Antigen Screen            Jatin Carbajal MD  [facility] - [place]

## 2021-11-12 NOTE — NURSING NOTE
"Chief Complaint   Patient presents with     Recheck Medication       Initial /78 (BP Location: Left arm, Patient Position: Sitting, Cuff Size: Adult Regular)   Pulse 79   Temp 97.8  F (36.6  C) (Tympanic)   Resp 18   Wt 86.2 kg (190 lb)   SpO2 97%   BMI 28.06 kg/m   Estimated body mass index is 28.06 kg/m  as calculated from the following:    Height as of 2/7/20: 1.753 m (5' 9\").    Weight as of this encounter: 86.2 kg (190 lb).  Medication Reconciliation: complete  Neno Sutton LPN  "

## 2022-01-05 ENCOUNTER — OFFICE VISIT (OUTPATIENT)
Dept: FAMILY MEDICINE | Facility: OTHER | Age: 67
End: 2022-01-05
Attending: FAMILY MEDICINE
Payer: MEDICARE

## 2022-01-05 VITALS
TEMPERATURE: 96.7 F | HEIGHT: 68 IN | HEART RATE: 62 BPM | DIASTOLIC BLOOD PRESSURE: 60 MMHG | WEIGHT: 197.4 LBS | SYSTOLIC BLOOD PRESSURE: 135 MMHG | OXYGEN SATURATION: 96 % | BODY MASS INDEX: 29.92 KG/M2

## 2022-01-05 DIAGNOSIS — Z23 NEED FOR VACCINATION: ICD-10-CM

## 2022-01-05 DIAGNOSIS — G56.03 BILATERAL CARPAL TUNNEL SYNDROME: ICD-10-CM

## 2022-01-05 DIAGNOSIS — E78.5 HYPERLIPIDEMIA LDL GOAL <130: ICD-10-CM

## 2022-01-05 DIAGNOSIS — R20.2 PARESTHESIAS: ICD-10-CM

## 2022-01-05 DIAGNOSIS — R25.2 MUSCLE CRAMPS: ICD-10-CM

## 2022-01-05 DIAGNOSIS — Z13.6 SCREENING FOR AAA (ABDOMINAL AORTIC ANEURYSM): ICD-10-CM

## 2022-01-05 DIAGNOSIS — Z00.00 ENCOUNTER FOR MEDICAL EXAMINATION TO ESTABLISH CARE: Primary | ICD-10-CM

## 2022-01-05 DIAGNOSIS — L81.9 ATYPICAL PIGMENTED LESION: ICD-10-CM

## 2022-01-05 DIAGNOSIS — R29.898 WEAKNESS OF BOTH HANDS: ICD-10-CM

## 2022-01-05 DIAGNOSIS — E55.9 HYPOVITAMINOSIS D: ICD-10-CM

## 2022-01-05 LAB
ALBUMIN SERPL-MCNC: 3.9 G/DL (ref 3.4–5)
ALP SERPL-CCNC: 148 U/L (ref 40–150)
ALT SERPL W P-5'-P-CCNC: 49 U/L (ref 0–70)
ANION GAP SERPL CALCULATED.3IONS-SCNC: 6 MMOL/L (ref 3–14)
AST SERPL W P-5'-P-CCNC: 28 U/L (ref 0–45)
BILIRUB SERPL-MCNC: 0.6 MG/DL (ref 0.2–1.3)
BUN SERPL-MCNC: 10 MG/DL (ref 7–30)
CALCIUM SERPL-MCNC: 9.2 MG/DL (ref 8.5–10.1)
CHLORIDE BLD-SCNC: 109 MMOL/L (ref 94–109)
CHOLEST SERPL-MCNC: 198 MG/DL
CO2 SERPL-SCNC: 24 MMOL/L (ref 20–32)
CREAT SERPL-MCNC: 1.09 MG/DL (ref 0.66–1.25)
FASTING STATUS PATIENT QL REPORTED: YES
GFR SERPL CREATININE-BSD FRML MDRD: 75 ML/MIN/1.73M2
GLUCOSE BLD-MCNC: 107 MG/DL (ref 70–99)
HDLC SERPL-MCNC: 73 MG/DL
LDLC SERPL CALC-MCNC: 98 MG/DL
NONHDLC SERPL-MCNC: 125 MG/DL
POTASSIUM BLD-SCNC: 4.3 MMOL/L (ref 3.4–5.3)
PROT SERPL-MCNC: 7.7 G/DL (ref 6.8–8.8)
SODIUM SERPL-SCNC: 139 MMOL/L (ref 133–144)
TRIGL SERPL-MCNC: 137 MG/DL

## 2022-01-05 PROCEDURE — G0463 HOSPITAL OUTPT CLINIC VISIT: HCPCS

## 2022-01-05 PROCEDURE — 82306 VITAMIN D 25 HYDROXY: CPT | Mod: ZL | Performed by: FAMILY MEDICINE

## 2022-01-05 PROCEDURE — 99215 OFFICE O/P EST HI 40 MIN: CPT | Performed by: FAMILY MEDICINE

## 2022-01-05 PROCEDURE — 82607 VITAMIN B-12: CPT | Mod: ZL | Performed by: FAMILY MEDICINE

## 2022-01-05 PROCEDURE — 90471 IMMUNIZATION ADMIN: CPT

## 2022-01-05 PROCEDURE — G0463 HOSPITAL OUTPT CLINIC VISIT: HCPCS | Mod: 25

## 2022-01-05 PROCEDURE — 80053 COMPREHEN METABOLIC PANEL: CPT | Mod: ZL | Performed by: FAMILY MEDICINE

## 2022-01-05 PROCEDURE — 36415 COLL VENOUS BLD VENIPUNCTURE: CPT | Mod: ZL | Performed by: FAMILY MEDICINE

## 2022-01-05 PROCEDURE — 90715 TDAP VACCINE 7 YRS/> IM: CPT

## 2022-01-05 PROCEDURE — 80061 LIPID PANEL: CPT | Mod: ZL | Performed by: FAMILY MEDICINE

## 2022-01-05 RX ORDER — ROSUVASTATIN CALCIUM 10 MG/1
10 TABLET, COATED ORAL DAILY
Qty: 90 TABLET | Refills: 3 | Status: SHIPPED | OUTPATIENT
Start: 2022-01-05 | End: 2022-11-02

## 2022-01-05 SDOH — HEALTH STABILITY: PHYSICAL HEALTH: ON AVERAGE, HOW MANY MINUTES DO YOU ENGAGE IN EXERCISE AT THIS LEVEL?: 0 MIN

## 2022-01-05 SDOH — HEALTH STABILITY: PHYSICAL HEALTH: ON AVERAGE, HOW MANY DAYS PER WEEK DO YOU ENGAGE IN MODERATE TO STRENUOUS EXERCISE (LIKE A BRISK WALK)?: 0 DAYS

## 2022-01-05 ASSESSMENT — LIFESTYLE VARIABLES
HOW OFTEN DO YOU HAVE SIX OR MORE DRINKS ON ONE OCCASION: NEVER
HOW MANY STANDARD DRINKS CONTAINING ALCOHOL DO YOU HAVE ON A TYPICAL DAY: 1 OR 2
HOW OFTEN DO YOU HAVE A DRINK CONTAINING ALCOHOL: 4 OR MORE TIMES A WEEK

## 2022-01-05 ASSESSMENT — PAIN SCALES - GENERAL: PAINLEVEL: NO PAIN (0)

## 2022-01-05 ASSESSMENT — MIFFLIN-ST. JEOR: SCORE: 1658.08

## 2022-01-05 NOTE — PROGRESS NOTES
"  Assessment & Plan     Encounter for medical examination to establish care  Records here    Hyperlipidemia LDL goal <130  Fasting today, had simvastatin changed to crestor 2 mos ago, will recheck labs  Follow-up next fall for med review  - Lipid Profile (Chol, Trig, HDL, LDL calc); Future  - Comprehensive metabolic panel; Future  - rosuvastatin (CRESTOR) 10 MG tablet; Take 1 tablet (10 mg) by mouth daily  - Lipid Profile (Chol, Trig, HDL, LDL calc)  - Comprehensive metabolic panel    Need for vaccination  due  - TDAP VACCINE (Adacel, Boostrix)  [[id number]]    Screening for AAA (abdominal aortic aneurysm)  agreeable  - Abdominal Aortic Aneurysm Screening/Tracking    Atypical pigmented lesion  Basal cell ca??  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 and unlikely to resolve without surgery, he had surgery on the right but it was ineffective  - Adult Neurology Referral    Bilateral carpal tunnel syndrome  Re-consult  - Orthopedic  Referral; Future  - Adult Neurology Referral    Paresthesias    - Vitamin B12    Hypovitaminosis D    - Vitamin D Deficiency    Muscle cramps  Increase water intake and/or add electrolytes      43 minutes spent on the date of the encounter doing chart review, history and exam, documentation and further activities per the note       BMI:   Estimated body mass index is 29.84 kg/m  as calculated from the following:    Height as of this encounter: 1.732 m (5' 8.2\").    Weight as of this encounter: 89.5 kg (197 lb 6.4 oz).     Patient was agreeable to this plan and had no further questions.  There are no Patient Instructions on file for this visit.    No follow-ups on file.    Nydia Dexter MD  [facility] - [place] Pollard is a 66 year old who presents for the following health issues     HPI     Hyperlipidemia Follow-Up      Are you regularly taking any medication or supplement to " "lower your cholesterol?   Yes- crestor    Are you having muscle aches or other side effects that you think could be caused by your cholesterol lowering medication?  No    Pain History:  When did you first notice your pain? - 1 to 6 weeks   Have you seen any provider previously for this issue? No  How has your pain affected your ability to work? Not currently working - unrelated to pain  Where in your body do you have pain? Back Pain  Onset/Duration: 15 years  Description:   Location of pain: low back right  Character of pain: dull ache  Pain radiation: none  New numbness or weakness in legs, not attributed to pain: no   Intensity: Currently 0/10, mild  Progression of Symptoms: BETTER, no issues recently      Several other questions today, has appt in 2 wks for CPE but just had all his labs done 2 mos ago  Still downhill skiis  Retired from teaching but still doing music      Review of Systems   Constitutional, HEENT, cardiovascular, pulmonary, gi and gu systems are negative, except as otherwise noted.      Objective    /60 (BP Location: Left arm)   Pulse 62   Temp (!) 96.7  F (35.9  C) (Tympanic)   Ht 1.732 m (5' 8.2\")   Wt 89.5 kg (197 lb 6.4 oz)   SpO2 96%   BMI 29.84 kg/m    There is no height or weight on file to calculate BMI.  Physical Exam   GENERAL: healthy, alert and no distress  EYES: Eyes grossly normal to inspection, PERRL and conjunctivae and sclerae normal  HENT: ear canals and TM's normal, nose and mouth without ulcers or lesions  NECK: no adenopathy, no asymmetry, masses, or scars and thyroid normal to palpation  RESP: lungs clear to auscultation - no rales, rhonchi or wheezes  CV: regular rate and rhythm, normal S1 S2, no S3 or S4, no murmur, click or rub, no peripheral edema and peripheral pulses strong  ABDOMEN: soft, nontender, no hepatosplenomegaly, no masses and bowel sounds normal  MS: no gross musculoskeletal defects noted, no edema  SKIN: keratoses - seborrheic # scattered and " keloid - chest left upper -- vs basal cell?  NEURO: Normal strength and tone, mentation intact and speech normal  PSYCH: mentation appears normal, affect normal/bright    Results for orders placed or performed in visit on 01/05/22   Lipid Profile (Chol, Trig, HDL, LDL calc)     Status: None   Result Value Ref Range    Cholesterol 198 <200 mg/dL    Triglycerides 137 <150 mg/dL    Direct Measure HDL 73 >=40 mg/dL    LDL Cholesterol Calculated 98 <=100 mg/dL    Non HDL Cholesterol 125 <130 mg/dL    Patient Fasting > 8hrs? Yes     Narrative    Cholesterol  Desirable:  <200 mg/dL    Triglycerides  Normal:  Less than 150 mg/dL  Borderline High:  150-199 mg/dL  High:  200-499 mg/dL  Very High:  Greater than or equal to 500 mg/dL    Direct Measure HDL  Female:  Greater than or equal to 50 mg/dL   Male:  Greater than or equal to 40 mg/dL    LDL Cholesterol  Desirable:  <100mg/dL  Above Desirable:  100-129 mg/dL   Borderline High:  130-159 mg/dL   High:  160-189 mg/dL   Very High:  >= 190 mg/dL    Non HDL Cholesterol  Desirable:  130 mg/dL  Above Desirable:  130-159 mg/dL  Borderline High:  160-189 mg/dL  High:  190-219 mg/dL  Very High:  Greater than or equal to 220 mg/dL   Comprehensive metabolic panel     Status: Abnormal   Result Value Ref Range    Sodium 139 133 - 144 mmol/L    Potassium 4.3 3.4 - 5.3 mmol/L    Chloride 109 94 - 109 mmol/L    Carbon Dioxide (CO2) 24 20 - 32 mmol/L    Anion Gap 6 3 - 14 mmol/L    Urea Nitrogen 10 7 - 30 mg/dL    Creatinine 1.09 0.66 - 1.25 mg/dL    Calcium 9.2 8.5 - 10.1 mg/dL    Glucose 107 (H) 70 - 99 mg/dL    Alkaline Phosphatase 148 40 - 150 U/L    AST 28 0 - 45 U/L    ALT 49 0 - 70 U/L    Protein Total 7.7 6.8 - 8.8 g/dL    Albumin 3.9 3.4 - 5.0 g/dL    Bilirubin Total 0.6 0.2 - 1.3 mg/dL    GFR Estimate 75 >60 mL/min/1.73m2

## 2022-01-05 NOTE — NURSING NOTE
"Chief Complaint   Patient presents with     Establish Care       Initial /60 (BP Location: Left arm)   Pulse 62   Temp (!) 96.7  F (35.9  C) (Tympanic)   Ht 1.732 m (5' 8.2\")   Wt 89.5 kg (197 lb 6.4 oz)   SpO2 96%   BMI 29.84 kg/m   Estimated body mass index is 29.84 kg/m  as calculated from the following:    Height as of this encounter: 1.732 m (5' 8.2\").    Weight as of this encounter: 89.5 kg (197 lb 6.4 oz).  Medication Reconciliation: complete  Cale Rachel LPN  "

## 2022-01-06 LAB
DEPRECATED CALCIDIOL+CALCIFEROL SERPL-MC: 23 UG/L (ref 20–75)
VIT B12 SERPL-MCNC: 592 PG/ML (ref 193–986)

## 2022-01-12 ENCOUNTER — OFFICE VISIT (OUTPATIENT)
Dept: SURGERY | Facility: OTHER | Age: 67
End: 2022-01-12
Attending: FAMILY MEDICINE
Payer: COMMERCIAL

## 2022-01-12 ENCOUNTER — PREP FOR PROCEDURE (OUTPATIENT)
Dept: SURGERY | Facility: OTHER | Age: 67
End: 2022-01-12

## 2022-01-12 VITALS
WEIGHT: 197.5 LBS | DIASTOLIC BLOOD PRESSURE: 74 MMHG | BODY MASS INDEX: 29.25 KG/M2 | SYSTOLIC BLOOD PRESSURE: 126 MMHG | HEIGHT: 69 IN | HEART RATE: 81 BPM | OXYGEN SATURATION: 96 % | TEMPERATURE: 97 F

## 2022-01-12 DIAGNOSIS — L81.9 ATYPICAL PIGMENTED LESION: ICD-10-CM

## 2022-01-12 DIAGNOSIS — Z01.818 PREOP TESTING: Primary | ICD-10-CM

## 2022-01-12 DIAGNOSIS — L98.9 SKIN LESION: Primary | ICD-10-CM

## 2022-01-12 PROCEDURE — G0463 HOSPITAL OUTPT CLINIC VISIT: HCPCS

## 2022-01-12 PROCEDURE — 99203 OFFICE O/P NEW LOW 30 MIN: CPT | Performed by: SURGERY

## 2022-01-12 ASSESSMENT — PAIN SCALES - GENERAL: PAINLEVEL: NO PAIN (0)

## 2022-01-12 ASSESSMENT — MIFFLIN-ST. JEOR: SCORE: 1666.23

## 2022-01-12 NOTE — PATIENT INSTRUCTIONS
Thank you for allowing our surgical team to participate in your care. Please review the instructions below.   If you have questions, you may contact us at the any of the following numbers:     [facility] Health Unit Coordinator: [phone]  Clinic Surgery Nurse ([facility]): [phone]  Hospital Surgery Education Nurse: [phone]    You are scheduled for: Excision of skin lesions on back, and chest with Dr. Groves on 2/14/22.  Admit Time: Hospital Surgery will call you the day before your procedure by 5pm with your arrival time.   If your surgery is on Monday, expect a call on Friday.   If you are not contacted before 5PM, please call admitting at [phone].   After hours or on weekends, please call [phone] to postpone.   Call the clinic surgery nurse if you become ill within 2 weeks of your procedure to reschedule.   This includes fever, chills, sore throat, cough, chest congestion, runny nose, or any other symptom of any other illness.     Preop appointment needed within the 30 days prior to your procedure.   COVID-19 test is needed 4 days before procedure. This testing is done at the upper level of the [facility] (weekdays and weekends-East Entrance) or at the [facility] (weekday mornings only).  Follow the signage for parking and bring your mobile phone (if you have one) to call the phone number ([phone]) on the sign outside the testing site for check-in. Stay in your vehicle until you are directed to enter. If you do not have a cell phone, please call [phone] just prior to leaving home with a description of the vehicle you will be arriving in. This has been scheduled for 2/10/22 at 10:00 at the [facility] site.        Please call the Hospital Surgery Education Nurse at [phone] 1 week prior to your procedure and have a medication list ready.   Do not take Aspirin or other NSAIDS (Ibuprofen, Motrin, Aleve, Celebrex, Naproxen, etc), vitamins/supplements 7 days before your  surgery unless you have been otherwise directed.   If you are prescribed a daily 81mg Aspirin, you may continue this.   If you are prescribed blood thinners or insulin, please contact your primary care provider for instructions.    Shower the night before and the morning of your procedure with Hibiclens soap.  On The Night Before Your Surgery:  1.  Remove your jewelry and leave off until after surgery. Wash your hair and body with your regular soap/shampoo. Use a freshly washed washcloth. Include cleaning inside your belly button. Rinse off soap/shampoo.  2. Wash your body from neck to feet using 1/2 of the 1st Hibiclens (Chlorhexidine) bottle with your bare hands. Do not use Hibiclens on your face, hair or genital area. Leave the soap on your body for one minute and rinse. If you are under age 18, you should purchase and use Dial soap instead and use enough to generously cover your body.    3. Repeat step 2 with the 2nd half of the bottle (or additional Dial soap if under age 18).  4. Rinse off all soap and dry with a freshly washed towel. Do not use lotions or hair products.  5. Sleep in freshly washed pajamas and freshly washed bedding.  On The Morning of Your Surgery:  1.Wash your hair and body with your regular soap/shampoo. Use another freshly washed washcloth. Rinse off.   2. Wash your body from neck to feet using 1/2 of the 2nd Hibiclens (Chlorhexidine) bottle (or Dial soap if you are under age 18) with your bare hands. Leave the soap on your body for one minute and rinse.  3. Repeat step 2 with the 2nd half of the bottle (or additional Dial soap if under age 18).  4. Rinse off all the soap and dry with another freshly washed towel. Do not use lotions or hair products.  5. Wear freshly washed clothing to the hospital.    Do not have anything to eat or drink after midnight the night before your surgery (or 8 hours prior to surgery), except clear liquids (water, clear juice, clear broth, plain coffee or tea  without cream or milk) up until 2 hours prior to arrival time, and then nothing by mouth.   Do not eat, drink, chew gum, suck on hard candy, or smoke after 2 hours prior to arrival. You can brush your teeth.   If you are directed to take any medications, take them with a tiny sip of water.   If you use an inhaler, bring it with you.  Arrive in clean, comfortable clothing.   Do not wear any jewelry, make-up, nail polish, lotions, hair products, or perfumes.   Bell in hospital admitting through the [facility].  A responsible adult must be available to drive you home and stay with you for 24 hours at home. If you need to take a taxi or the bus, you must have another responsible adult to ride with you. Your procedure will be cancelled if you do not have a responsible adult .     Return to clinic for a postop appointment 2 week(s) from your surgery date.              SURGERY HANDBOOK            Your surgery is scheduled:    Date: 2/14/22  ________________________________    Time: Hospital surgery will call with your arrival time 1 day prior to procedure.  ________________________________    Surgeon's Name:   _______________________        Pre-Op Physical Fax Numbers:          Pre-Admissions  [phone]        Your surgery is located at:  [place] [address]         Before Your Surgery  For Patients and Visitors at [place]    Welcome  You have been scheduled for surgery and we would like to give you some information that will assist in helping get the best possible outcome.    As you get ready for surgery, you may have a lot of questions.  This brochure will help you know what to expect before and after surgery.  You and your family are the most important members of your health care team.  You will need to take an active role in your care.    Be sure to ask questions and learn all that you can about your surgery.  If you have any safety  concerns, we urge you to tell a nurse as soon as possible.   This brochure is for information only.  It does not replace the advice of your doctor.  Always follow your doctor's advice.    If you or your  are deaf or hard of hearing, or prefer a language other than English, please let us know.  We have many free services, including interpreters and other aids to help you communicate. You may ask for help  through any member of your care team or by calling Language Services at [phone], option 2.    Before Surgery:   If for any reason you decide not to have the surgery, please contact our office.  We can easily cancel or reschedule the procedure. Please call your surgeon's office, after hours call [phone]      Any pain related to the surgery that occurs before the surgery needs to be reported and managed by your primary care or referring doctor.      Please keep in mind that the time of surgery is subject to change.  Make sure you have nothing to eat or drink after midnight.  If your surgery is later in the afternoon, this recommendation might change, but not until the day before surgery after the actual time of the surgery has been established.      GETTING READY FOR SURGERY  Always follow your surgeon's instructions.  If you don't, your surgery could be canceled.  Please use the following checklist.      Within 30 Days of Surgery:     Have a pre-surgery physical exam with your family doctor or partner.    If you use a [facility], all of your information from the pre-op physical will be in the [employer] computer system.    Ask the doctor to send all of your results to the hospital before the surgery.  The doctor also may ask you to bring the results with you on the day of surgery or you can fax them to [phone].    Tell the doctor if:    You are allergic to latex or rubber  (Latex and rubber gloves are often used in medical care).    You are taking any medicines (including aspirin),  vitamins (Vitamin E, Fish Oil, Omegas) or herbal products.  You will need to stop taking some medicines before surgery.    You have any medical problems (allergies, diabetes or heart disease, for example).    You have a pacemaker or an AICD (automatic implanted cardiac defibrillator).  If you do, please bring the ID card with you on the day of surgery.    You are a smoker.  People who smoke have a higher risk of infection after surgery.  Ask your doctor how you can quit smoking.    Within 7 days of Surgery:  Prior to your surgical procedure, a nurse will be contacting you to obtain a health history. A nurse will call you within a few days of surgery to go over these and other instructions.  If you do not hear from them, please call them at [phone].        Call your insurance company.  Ask if you need pre-approval for your surgery.  If you do not have insurance, please let us know.      Arrange for someone to drive you home after surgery.  If you will have same-day surgery, you may not drive or take public transportation home by yourself.    Arrange for someone to stay with you for 24 hours after you go home.  This person must be a responsible adult (ie- Family member or friend).    The Day Before Surgery:     Call your surgeon if there are any changes in your health.  This includes signs of a cold or flu (such as a sore throat, runny nose, cough, rash or fever).    Do not smoke, drink alcohol or take over-the-counter medicine (unless your surgeon tells you to) for 24 hours before and after surgery.    If you take prescribed drugs:  You may need to stop them until after the surgery.  Follow your doctor's orders.  You may resume Aspirin and/or blood thinners after your surgery as directed by your physician/surgeon.    NO FOOD OR DRINK AFTER MIDNIGHT.  Follow your surgeon's orders for eating and drinking.  You need to have an empty stomach before surgery.  This will make the surgery as safe as possible.  If you  don't follow your doctor's orders, your surgery could be changed to another date.    The Day of Surgery:    Take a shower or bath the night before and the morning of surgery.  Use antiseptic soap or the soap your surgeon gave you.  Gently clean the skin.  Do not shave or scrub your surgery site.    Please remove deodorant, cologne, scented lotion, makeup, nail polish and jewelry (including rings and body piercings).  If you wear artificial nails, please remove at least one nail before coming to the hospital.    Wear clean, loose clothing to the hospital.    Bring these items to the hospital:  1. Your insurance card.  2. A list of all the medicines you take.  Include vitamins, minerals, herbs and over-the-counter drugs.  Note any drug allergies.  3. A copy of your advance health care directive, if you have one.  This tells us what treatment you would want -- and who would make health care decisions -- if you could no longer speak for yourself.  You may request this form in advance or download it from [email or web address].  4. A case for any glasses, contact lenses, hearing aids or dentures.  5. Your inhaler or CPAP machine, if you use these at home.  Leave extra cash, jewelry and other valuables at home.      When You Arrive:  When you get to the hospital, you will:    Check in.  If you are under age 18, you must be with a parent or legal guardian.    Electronically sign consent forms, if you haven't already.  These electronic forms state that you know the risks and benefits of surgery.  When you sign the forms, you give us permission to do the surgery.  Do not sign them unless you understand what will happen during and after your surgery.  If you have any questions about your surgery, ask to speak with your doctor before you sign the forms.  If you don't understand the answers, ask again.    Receive a copy of the Patients Bill of Rights.  If you do not receive a copy, please ask for one.    Change into hospital  "clothes.  Your belongings will be placed in a bag.  We will return them to you after surgery.    Meet with the anesthesia provider.  He or she will tell you what kind of anesthesia (medicine) will be used to keep you comfortable during surgery.  Remember: It's okay to remind doctors and nurses to wash their hands before touching you.   In most cases, your surgeon will use a marker to write his or her initials on the surgery site.  This ensures that the exact site is operated on.  For safety reasons, we will ask you the same questions many times.  For example, we may ask your name and birth date over and over again.  Friends and family can stay with you until it's time for surgery.  While you're in surgery, they will be in the waiting area.  Please note that cell phones are not allowed in some patient care areas.  If you have questions about what will happen in the operating room, talk to your care team.    After Surgery:    We will move you to a recovery room where we will watch you closely.  If you have any pain or discomfort, tell your nurse.  He or she will try to make you comfortable.      If you are staying overnight we will move you to your hospital room after you are awake.    If you are going home we will move you to another room.  Friends and family may be able to join you.  The length of time you spend in recovery depends on the type of medicine you received, your medical condition, and the type of surgery you had.  Dealing with pain:  A nurse will check your comfort level often during your stay.  He or she will work with you to manage your pain.  Remember:    All pain is real.  There are many ways to control pain.  We can help you decide what works best for you.    Ask for pain medicine when you need it.  Don't try to \"tough it out\" -- this can make you feel worse.  Always take your medicine as ordered.    Medicine doesn't work the same for everyone.  If your medicine isn't working tell your nurse.  There " may be other medicines or treatments we can try.    Going Home:  We will let you know when you're ready to leave the hospital.  Before you leave, we will tell you how to care for yourself at home and prevent infections.  If you do not understand something, please say so.  We will answer any questions you have.  We will then help you get ready to leave.  You must have an adult with you for the first 24 hours after you leave the hospital. Take it easy when you get home.  You will need some time to recover -- you may be more tired than you realize at first.  Rest and relax for at least the first 24 hours at home.  You'll feel better and heal faster if you take good care of yourself.                      Pre-Operative Surgery Scrub    Purpose:   The skin harbors a large variety of bacteria, both infectious and noninfectious.  Showering with an antiseptic soap prior to an invasive procedure will decrease the number of transient and resident (good and bad) bacteria that is normally found on the skin.    Procedure:      Shower with 1 bottle of antiseptic soap (enclosed) the evening before and 1 bottle the morning of surgery (bathing the day of the procedure is most important).       Apply the soap at full strength (use the entire bottle).  Gently clean the skin, rinse, and dry with a clean towel that is freshly laundered (out of the dryer) and then put on clean clothing that is freshly laundered.        We have given you information regarding pre-op showering.  We recommend that patients wash with an antiseptic soap prior to surgery.  This cleanser will be given to you at the clinic or mailed to your home.  It is advised that you liberally wash the specific area surgery area the night before, and again in the morning before the surgery.  Do not apply lotion afterward.  We would like to keep the skin as clean as possible.    Thank you for following these important instructions.          After Surgery:  When you are  discharged from the recovery room, the nurses will review instructions with you and your caregiver.      Please wash your hands every time you touch the wound or change bandages or dressings.      Do not submerge the wound in water.  You may not use a bathtub or hot tub until the wound is closed.  The wait time frame is generally 2-3 weeks but any open area can be a source of incoming bacteria, so it is better to be on the safe side and avoid the tub until your wound is fully healed.      You may take a shower 24 hours after surgery.  Double check with your surgeon if it is ok for water to run over a wound, whether it has been sutured, stapled, glued or is open.  You may gently wash the wound using the antiseptic soap provided for your pre-surgery showering (do not use a washcloth).  Any mild soap will work as well.      Many surgical wounds will have small white strips of tape on them called Steri Strips.  Do not remove these.  The edges will curl and fall off within 7-10 days with normal showering.      If you are going home with sutures (stitches) or staples, you must return to the clinic to have them taken out, usually within 1-2 weeks.      Signs and symptoms of infection include:  1. Fever, temperature over 101.5 ' F  2. Redness  3. Swelling  4. Increasing pain  5. Green or yellow drainage which may or may not have a foul odor.    Symptoms of infection need to be reported to your surgery office. Please call your Surgeon.

## 2022-01-12 NOTE — PROGRESS NOTES
CLINIC NOTE - CONSULT  1/12/2022    Patient:Atul Coker    Referring Physician: Nydia Dexter    Reason for Referral: Lesions on torso    This is a 67 year old male with a lesion on the torso.  He has several lesions that he is worried about.  One on his left clavicle and another one on his back..      They have noticed if for several years  The lesion is getting larger.    The lesion is changing color.    The patient does not have a history of Melanoma.    The patient does desire excision.     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 Mother      Other - See Comments Mother         colon polyps     Hypertension Mother      Breast Cancer Mother 80     C.A.D. Father      Myocardial Infarction Father 56        myocardial infarction     Hypertension Sister      Hyperlipidemia Sister   "    Hypertension Sister      Hyperlipidemia Sister      Liver Disease Brother         end stage     Hypertension Brother      Hyperlipidemia Brother      Cerebrovascular Disease Maternal Grandmother      Other - See Comments Maternal Grandfather         accident     Heart Disease Paternal Grandmother      Heart Failure Paternal Grandfather        History of Tobacco Use:  History   Smoking Status     Never Smoker   Smokeless Tobacco     Never Used       Current Medications:  Current Outpatient Medications   Medication Sig Dispense Refill     ASPIRIN PO Take 81 mg by mouth daily        azelastine (ASTELIN) 0.1 % nasal spray SPRAY 2 SPRAYS INTO BOTH NOSTRILS 2 TIMES DAILY 30 mL 2     Coenzyme Q10 (COQ10) 200 MG CAPS Take 1 capsule by mouth daily        fexofenadine (ALLEGRA ALLERGY) 180 MG tablet Take 1 tablet by mouth daily.       multivitamin, therapeutic with minerals (MULTI-VITAMIN) TABS Take 1 tablet by mouth daily        order for DME Equipment being ordered: Orthotics 1 Device 0     order for DME Equipment being ordered: Left stirrup brace        DX:M25.572 LEFT ANKLE PAIN 1 Units 1     rosuvastatin (CRESTOR) 10 MG tablet Take 1 tablet (10 mg) by mouth daily 90 tablet 3     Famotidine (PEPCID AC MAXIMUM STRENGTH PO) Take 20 mg by mouth daily (Patient not taking: Reported on 1/5/2022)       guaiFENesin (MUCINEX) 600 MG 12 hr tablet Take 600 mg by mouth daily  (Patient not taking: Reported on 1/5/2022)       vitamin E (TOCOPHEROL) 400 units (360 mg) capsule Take 1 capsule (400 Units) by mouth 2 times daily (Patient not taking: Reported on 1/12/2022) 100 capsule 3       Allergies:  Allergies   Allergen Reactions     Seasonal Allergies      Penicillins Rash       ROS:  Pertinent items are noted in HPI.  All other systems are negative.    PHYSICAL EXAM:     Vital signs: /74   Pulse 81   Temp 97  F (36.1  C)   Ht 1.753 m (5' 9\")   Wt 89.6 kg (197 lb 8 oz)   SpO2 96%   BMI 29.17 kg/m     Weight: " [unfilled]   BMI: Body mass index is 29.17 kg/m .   General: Normal, healthy, cooperative, in no acute distress   Skin: no rashes   HEENT: PERRLA and EOMI   Neck: supple   Lungs: clear to auscultation   CV: Regular rate and rhythm without murmer   Abdominal: abdomen is soft without significant tenderness, masses, organomegaly or guarding   Extremities: No cyanosis, clubbing or edema noted bilaterally in Upper and Lower Extremities   Neurological: without deficit     On his left clavicle is a 1 x 1 cm lesion consistent with a probable basal cell carcinoma.  Under his right breast he does have a lesion that is multi pigmented with irregular borders.  On his back he has a lesion consistent with seborrheic keratoses.    ASSESSMENT: 67 year old male with several lesions on his torso.  The one on his left clavicle and the one under his right breast are more concerning.  The one on his back he desires to have removed though is not as concerning.      PLAN: Discussed options with the patient.  Will plan on taking the patient to the OR for surgical excision.      The risks, benefits, and alternatives to the planned procedure were fully discussed with the patient and/or the patient's representative(s). The risks of bleeding, infection, death, missing pathology, the need for additional procedures intra-operatively, the possible need for intra-operative consults, the possible need for transfusion therapy, cardiopulmonary compromise, the possible need for additional surgery for a complication were discussed with the patient and/or the patient's representative(s). The patient's and/or patient's representative(s) questions were addressed and answered. Informed consent was obtained from the patient and/or the patient's representative(s). The patient and/or the patient's representative(s) consent to proceed.

## 2022-01-12 NOTE — NURSING NOTE
"Chief Complaint   Patient presents with     Consult     atypical pigmented lesion        Initial /74   Pulse 81   Temp 97  F (36.1  C)   Ht 1.753 m (5' 9\")   Wt 89.6 kg (197 lb 8 oz)   SpO2 96%   BMI 29.17 kg/m   Estimated body mass index is 29.17 kg/m  as calculated from the following:    Height as of this encounter: 1.753 m (5' 9\").    Weight as of this encounter: 89.6 kg (197 lb 8 oz).  Medication Reconciliation: complete  Farnaz Marcos LPN    "

## 2022-02-02 ENCOUNTER — TRANSFERRED RECORDS (OUTPATIENT)
Dept: HEALTH INFORMATION MANAGEMENT | Facility: CLINIC | Age: 67
End: 2022-02-02

## 2022-02-07 ENCOUNTER — ANESTHESIA EVENT (OUTPATIENT)
Dept: SURGERY | Facility: HOSPITAL | Age: 67
End: 2022-02-07
Payer: MEDICARE

## 2022-02-07 DIAGNOSIS — G56.03 BILATERAL CARPAL TUNNEL SYNDROME: Primary | ICD-10-CM

## 2022-02-07 NOTE — ANESTHESIA PREPROCEDURE EVALUATION
Anesthesia Pre-Procedure Evaluation    Patient: Atul Coker   MRN: [id number] : 1955        Preoperative Diagnosis: Skin lesion [L98.9]    Procedure : Procedure(s):  Excision of multiple skin lesions on back  Excision of multiple skin lesions chest          Past Medical History:   Diagnosis Date     Allergic rhinitis, cause unspecified 2013    seasonal     Branch retinal vein occlusion of both eyes     bilateral, still getting injections with vitreoretinal group     CMV (cytomegalovirus infection) status positive (H)     remote     Concussion without loss of consciousness     fell skiing -- no memory issues or HAs, also had rotator cuff issues from fall     Hyperplastic colonic polyp      Other abnormal glucose 2013    prediabetes     Pure hypercholesterolemia 2012     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      Past Surgical History:   Procedure Laterality Date     CARPAL TUNNEL RELEASE RT/LT Right     Dr Hinton     COLONOSCOPY  2006    repeat in 10 yrs     COLONOSCOPY N/A 2019    due in   Procedure: COLONOSCOPY WITH POLYPECTOMY;  Surgeon: David Groves MD;  Location: [facility]     HERNIA REPAIR, INGUINAL RT/LT Right 2013    Right side     UPPER GI ENDOSCOPY      found vocal cord nodules     VASECTOMY  2013      Allergies   Allergen Reactions     Seasonal Allergies      Penicillins Rash      Social History     Tobacco Use     Smoking status: Never Smoker     Smokeless tobacco: Never Used   Substance Use Topics     Alcohol use: Yes     Alcohol/week: 7.0 standard drinks     Types: 7 Standard drinks or equivalent per week     Comment: occasionally      Wt Readings from Last 1 Encounters:   22 89.6 kg (197 lb 8 oz)        Anesthesia Evaluation   Pt has had prior anesthetic. Type: MAC and General.    History of anesthetic complications   slow to wake up.    ROS/MED HX  ENT/Pulmonary:     (+) allergic rhinitis,      Neurologic:  - neg neurologic ROS     Cardiovascular:     (+) Dyslipidemia -----    METS/Exercise Tolerance: >4 METS    Hematologic:  - neg hematologic  ROS     Musculoskeletal:   (+) arthritis,     GI/Hepatic:     (+) GERD,     Renal/Genitourinary:       Endo:  - neg endo ROS     Psychiatric/Substance Use:  - neg psychiatric ROS     Infectious Disease: Comment: Hx CMV - neg infectious disease ROS     Malignancy:  - neg malignancy ROS     Other:  - neg other ROS          Physical Exam    Airway        Mallampati: III   TM distance: < 3 FB   Neck ROM: full   Mouth opening: < 3 cm    Respiratory Devices and Support         Dental  no notable dental history         Cardiovascular   cardiovascular exam normal       Rhythm and rate: regular and normal     Pulmonary   pulmonary exam normal        breath sounds clear to auscultation           OUTSIDE LABS:  CBC:   Lab Results   Component Value Date    WBC 7.1 11/12/2021    WBC 6.4 11/16/2020    HGB 16.2 11/12/2021    HGB 16.0 11/16/2020    HCT 47.4 11/12/2021    HCT 46.8 11/16/2020     11/12/2021     11/16/2020     BMP:   Lab Results   Component Value Date     01/05/2022     11/12/2021    POTASSIUM 4.3 01/05/2022    POTASSIUM 4.2 11/12/2021    CHLORIDE 109 01/05/2022    CHLORIDE 110 (H) 11/12/2021    CO2 24 01/05/2022    CO2 23 11/12/2021    BUN 10 01/05/2022    BUN 14 11/12/2021    CR 1.09 01/05/2022    CR 1.13 11/12/2021     (H) 01/05/2022    GLC 90 11/12/2021     COAGS: No results found for: PTT, INR, FIBR  POC: No results found for: BGM, HCG, HCGS  HEPATIC:   Lab Results   Component Value Date    ALBUMIN 3.9 01/05/2022    PROTTOTAL 7.7 01/05/2022    ALT 49 01/05/2022    AST 28 01/05/2022    ALKPHOS 148 01/05/2022    BILITOTAL 0.6 01/05/2022     OTHER:   Lab Results   Component Value Date    FAVIO 9.2 01/05/2022    MAG 2.2 02/07/2020    TSH 1.18 11/12/2021       Anesthesia Plan    ASA Status:  3   NPO Status:  NPO Appropriate     Anesthesia Type: MAC.     - Reason for MAC: straight local not clinically adequate   Induction: Intravenous, Propofol.   Maintenance: Balanced.        Consents    Anesthesia Plan(s) and associated risks, benefits, and realistic alternatives discussed. Questions answered and patient/representative(s) expressed understanding.     - Discussed: Risks, Benefits and Alternatives for BOTH SEDATION and the PROCEDURE were discussed     - Discussed with:  Patient      - Extended Intubation/Ventilatory Support Discussed: No.      - Patient is DNR/DNI Status: No    Use of blood products discussed: No .     Postoperative Care    Pain management: IV analgesics.   PONV prophylaxis: Ondansetron (or other 5HT-3), Dexamethasone or Solumedrol, Background Propofol Infusion     Comments:                SHAHZAD Canales CRNA

## 2022-02-10 ENCOUNTER — OFFICE VISIT (OUTPATIENT)
Dept: FAMILY MEDICINE | Facility: OTHER | Age: 67
End: 2022-02-10
Attending: SURGERY
Payer: MEDICARE

## 2022-02-10 DIAGNOSIS — Z01.812 ENCOUNTER FOR PREOPERATIVE SCREENING LABORATORY TESTING FOR COVID-19 VIRUS: Primary | ICD-10-CM

## 2022-02-10 DIAGNOSIS — Z11.52 ENCOUNTER FOR PREOPERATIVE SCREENING LABORATORY TESTING FOR COVID-19 VIRUS: Primary | ICD-10-CM

## 2022-02-10 PROCEDURE — U0003 INFECTIOUS AGENT DETECTION BY NUCLEIC ACID (DNA OR RNA); SEVERE ACUTE RESPIRATORY SYNDROME CORONAVIRUS 2 (SARS-COV-2) (CORONAVIRUS DISEASE [COVID-19]), AMPLIFIED PROBE TECHNIQUE, MAKING USE OF HIGH THROUGHPUT TECHNOLOGIES AS DESCRIBED BY CMS-2020-01-R: HCPCS | Mod: ZL

## 2022-02-10 NOTE — H&P (VIEW-ONLY)
[facility] [place]  [address]  [place] [address]  Phone: [phone]  Primary Provider: Nydia Dexter  Pre-op Performing Provider: NYDIA DEXTER      PREOPERATIVE EVALUATION:  Today's date: 2/11/2022    Atul Coker is a 67 year old male who presents for a preoperative evaluation.    Surgical Information:  Surgery/Procedure: Excision of multiple skin lesions on back/chest  [place] Location[place] [facility] [place]  Surgeon: Dr. Groves  Surgery Date: 2/14/22  Time of Surgery: TBD  Where patient plans to recover: At home with family  Fax number for surgical facility: Note does not need to be faxed, will be available electronically in Epic.    Type of Anesthesia Anticipated: to be determined    Assessment & Plan     The proposed surgical procedure is considered LOW risk.    Preop general physical exam    - Comprehensive Metabolic Panel; Future  - CBC with platelets and differential; Future  - EKG 12-lead complete w/read - (Clinic Performed)  - Comprehensive Metabolic Panel  - CBC with platelets and differential    Skin cancer of anterior chest  Right upper lesion surgeon has concerns, left upper chest looked like basal cell ca to me      Possible Sleep Apnea: n/a     Risks and Recommendations:  The patient has the following additional risks and recommendations for perioperative complications:   - No identified additional risk factors other than previously addressed    Medication Instructions:  Patient is on no chronic medications    RECOMMENDATION:  APPROVAL GIVEN to proceed with proposed procedure, without further diagnostic evaluation.    Provider  Link to [facility] Help Grid :[id number]}    Subjective     HPI related to upcoming procedure:  Several lesions concerning, one in particular for possible skin cancer      Preop Questions 2/11/2022   1. Have you ever had a heart attack or stroke? No   2. Have you ever had surgery on your heart or blood vessels, such as a stent placement, a coronary artery bypass, or  surgery on an artery in your head, neck, heart, or legs? No   3. Do you have chest pain with activity? No   4. Do you have a history of  heart failure? No   5. Do you currently have a cold, bronchitis or symptoms of other infection? No   6. Do you have a cough, shortness of breath, or wheezing? No   7. Do you or anyone in your family have previous history of blood clots? No   8. Do you or does anyone in your family have a serious bleeding problem such as prolonged bleeding following surgeries or cuts? No   9. Have you ever had problems with anemia or been told to take iron pills? No   10. Have you had any abnormal blood loss such as black, tarry or bloody stools? No   11. Have you ever had a blood transfusion? No   12. Are you willing to have a blood transfusion if it is medically needed before, during, or after your surgery? Yes   13. Have you or any of your relatives ever had problems with anesthesia? YES - Go under easily, slow to wake up.    14. Do you have sleep apnea, excessive snoring or daytime drowsiness? YES - Snoring   14a. Do you have a CPAP machine? No   15. Do you have any artifical heart valves or other implanted medical devices like a pacemaker, defibrillator, or continuous glucose monitor? No   16. Do you have artificial joints? No   17. Are you allergic to latex? No     Health Care Directive:  Patient does not have a Health Care Directive or Living Will: Discussed advance care planning with patient; however, patient declined at this time.    Preoperative Review of :   reviewed - no record of controlled substances prescribed.      Status of Chronic Conditions:  See problem list for active medical problems.  Problems all longstanding and stable, except as noted/documented.  See ROS for pertinent symptoms related to these conditions.      Review of Systems  CONSTITUTIONAL: NEGATIVE for fever, chills, change in weight  INTEGUMENTARY/SKIN: NEGATIVE for worrisome rashes, moles or lesions  EYES:  NEGATIVE for vision changes or irritation  ENT/MOUTH: NEGATIVE for ear, mouth and throat problems  RESP: NEGATIVE for significant cough or SOB  CV: NEGATIVE for chest pain, palpitations or peripheral edema  GI: NEGATIVE for nausea, abdominal pain, heartburn, or change in bowel habits  : NEGATIVE for frequency, dysuria, or hematuria  MUSCULOSKELETAL: NEGATIVE for significant arthralgias or myalgia  NEURO: NEGATIVE for weakness, dizziness or paresthesias  ENDOCRINE: NEGATIVE for temperature intolerance, skin/hair changes  HEME: NEGATIVE for bleeding problems  PSYCHIATRIC: NEGATIVE for changes in mood or affect    Patient Active Problem List    Diagnosis Date Noted     Atypical pigmented lesion 01/05/2022     Priority: Medium     Bilateral carpal tunnel syndrome 01/05/2022     Priority: Medium     Weakness of both hands 01/05/2022     Priority: Medium     Paresthesias 01/05/2022     Priority: Medium     Hypovitaminosis D 01/05/2022     Priority: Medium     Muscle cramps 01/05/2022     Priority: Medium     Retinal vein occlusion of right eye 06/13/2019     Priority: Medium     Branch retinal vein occlusion of left eye with macular edema 06/13/2019     Priority: Medium     Cervical disc disorder at C6-C7 level with radiculopathy 06/13/2019     Priority: Medium     Screening for AAA (abdominal aortic aneurysm) 06/12/2019     Priority: Medium     Seasonal allergic rhinitis 06/19/2016     Priority: Medium     Chronic bilateral low back pain without sciatica 06/19/2016     Priority: Medium     GERD (gastroesophageal reflux disease) 03/26/2013     Priority: Medium     CMV (cytomegalovirus) positive (H) 03/26/2013     Priority: Medium     Hyperlipidemia LDL goal <130 03/26/2013     Priority: Medium      Past Medical History:   Diagnosis Date     Allergic rhinitis, cause unspecified 03/05/2013    seasonal     Branch retinal vein occlusion of both eyes     bilateral, still getting injections with vitreoretinal group     CMV  (cytomegalovirus infection) status positive (H)     remote     Concussion without loss of consciousness     fell skiing -- no memory issues or HAs, also had rotator cuff issues from fall     Hyperplastic colonic polyp 2019     Other abnormal glucose 03/05/2013    prediabetes     Pure hypercholesterolemia 01/24/2012     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     Past Surgical History:   Procedure Laterality Date     CARPAL TUNNEL RELEASE RT/LT Right 2018    Dr Hinton     COLONOSCOPY  08/07/2006    repeat in 10 yrs     COLONOSCOPY N/A 05/23/2019    due in 2024  Procedure: COLONOSCOPY WITH POLYPECTOMY;  Surgeon: David Groves MD;  Location: [facility]     HERNIA REPAIR, INGUINAL RT/LT Right 01/01/2013    Right side     UPPER GI ENDOSCOPY  2001    found vocal cord nodules     VASECTOMY  01/01/2013     Current Outpatient Medications   Medication Sig Dispense Refill     ASPIRIN PO Take 81 mg by mouth daily        azelastine (ASTELIN) 0.1 % nasal spray SPRAY 2 SPRAYS INTO BOTH NOSTRILS 2 TIMES DAILY 30 mL 2     Coenzyme Q10 (COQ10) 200 MG CAPS Take 1 capsule by mouth daily        Famotidine (PEPCID AC MAXIMUM STRENGTH PO) Take 20 mg by mouth daily (Patient not taking: Reported on 1/5/2022)       fexofenadine (ALLEGRA ALLERGY) 180 MG tablet Take 1 tablet by mouth daily.       guaiFENesin (MUCINEX) 600 MG 12 hr tablet Take 600 mg by mouth daily  (Patient not taking: Reported on 1/5/2022)       multivitamin, therapeutic with minerals (MULTI-VITAMIN) TABS Take 1 tablet by mouth daily        order for DME Equipment being ordered: Orthotics 1 Device 0     order for DME Equipment being ordered: Left stirrup brace        DX:M25.572 LEFT ANKLE PAIN 1 Units 1     rosuvastatin (CRESTOR) 10 MG tablet Take 1 tablet (10 mg) by mouth daily 90 tablet 3     vitamin E (TOCOPHEROL) 400 units (360 mg) capsule Take 1 capsule (400 Units) by mouth 2 times daily (Patient not taking: Reported on 1/12/2022) 100 capsule 3        Allergies   Allergen Reactions     Seasonal Allergies      Penicillins Rash        Social History     Tobacco Use     Smoking status: Never Smoker     Smokeless tobacco: Never Used   Substance Use Topics     Alcohol use: Yes     Alcohol/week: 7.0 standard drinks     Types: 7 Standard drinks or equivalent per week     Comment: occasionally     Family History   Problem Relation Age of Onset     C.A.D. Mother      Other - See Comments Mother         colon polyps     Hypertension Mother      Breast Cancer Mother 80     C.A.D. Father      Myocardial Infarction Father 56        myocardial infarction     Hypertension Sister      Hyperlipidemia Sister      Hypertension Sister      Hyperlipidemia Sister      Liver Disease Brother         end stage     Hypertension Brother      Hyperlipidemia Brother      Cerebrovascular Disease Maternal Grandmother      Other - See Comments Maternal Grandfather         accident     Heart Disease Paternal Grandmother      Heart Failure Paternal Grandfather      History   Drug Use No         Objective     /72 (BP Location: Right arm)   Pulse 78   Temp 98.3  F (36.8  C) (Tympanic)   Wt 90.2 kg (198 lb 12.8 oz)   SpO2 97%   BMI 29.36 kg/m      Physical Exam    GENERAL APPEARANCE: healthy, alert and no distress     EYES: EOMI,  PERRL     HENT: ear canals and TM's normal and nose and mouth without ulcers or lesions     NECK: no adenopathy, no asymmetry, masses, or scars and thyroid normal to palpation     RESP: lungs clear to auscultation - no rales, rhonchi or wheezes     CV: regular rates and rhythm, normal S1 S2, no S3 or S4 and no murmur, click or rub     ABDOMEN:  soft, nontender, no HSM or masses and bowel sounds normal     MS: extremities normal- no gross deformities noted, no evidence of inflammation in joints, FROM in all extremities.     SKIN: no suspicious lesions or rashes     NEURO: Normal strength and tone, sensory exam grossly normal, mentation intact and speech  normal     PSYCH: mentation appears normal. and affect normal/bright     LYMPHATICS: No cervical adenopathy    Recent Labs   Lab Test 01/05/22  1054 11/12/21  1030 11/16/20  0916   HGB  --  16.2 16.0   PLT  --  171 169    138 140   POTASSIUM 4.3 4.2 4.4   CR 1.09 1.13 1.16        Diagnostics:  Recent Results (from the past 24 hour(s))   Comprehensive Metabolic Panel    Collection Time: 02/11/22  9:25 AM   Result Value Ref Range    Sodium 139 133 - 144 mmol/L    Potassium 3.8 3.4 - 5.3 mmol/L    Chloride 107 94 - 109 mmol/L    Carbon Dioxide (CO2) 25 20 - 32 mmol/L    Anion Gap 7 3 - 14 mmol/L    Urea Nitrogen 12 7 - 30 mg/dL    Creatinine 1.05 0.66 - 1.25 mg/dL    Calcium 8.9 8.5 - 10.1 mg/dL    Glucose 116 (H) 70 - 99 mg/dL    Alkaline Phosphatase 142 40 - 150 U/L    AST 24 0 - 45 U/L    ALT 41 0 - 70 U/L    Protein Total 7.6 6.8 - 8.8 g/dL    Albumin 3.8 3.4 - 5.0 g/dL    Bilirubin Total 0.5 0.2 - 1.3 mg/dL    GFR Estimate 78 >60 mL/min/1.73m2   CBC with platelets and differential    Collection Time: 02/11/22  9:25 AM   Result Value Ref Range    WBC Count 5.7 4.0 - 11.0 10e3/uL    RBC Count 5.07 4.40 - 5.90 10e6/uL    Hemoglobin 16.2 13.3 - 17.7 g/dL    Hematocrit 47.8 40.0 - 53.0 %    MCV 94 78 - 100 fL    MCH 32.0 26.5 - 33.0 pg    MCHC 33.9 31.5 - 36.5 g/dL    RDW 13.1 10.0 - 15.0 %    Platelet Count 168 150 - 450 10e3/uL    % Neutrophils 62 %    % Lymphocytes 24 %    % Monocytes 7 %    % Eosinophils 6 %    % Basophils 1 %    % Immature Granulocytes 0 %    NRBCs per 100 WBC 0 <1 /100    Absolute Neutrophils 3.5 1.6 - 8.3 10e3/uL    Absolute Lymphocytes 1.4 0.8 - 5.3 10e3/uL    Absolute Monocytes 0.4 0.0 - 1.3 10e3/uL    Absolute Eosinophils 0.3 0.0 - 0.7 10e3/uL    Absolute Basophils 0.1 0.0 - 0.2 10e3/uL    Absolute Immature Granulocytes 0.0 <=0.4 10e3/uL    Absolute NRBCs 0.0 10e3/uL      EKG: appears normal, NSR, normal axis, normal intervals, no acute ST/T changes c/w ischemia, no LVH by voltage  criteria, there are no prior tracings available    Revised Cardiac Risk Index (RCRI):  The patient has the following serious cardiovascular risks for perioperative complications:   - No serious cardiac risks = 0 points     RCRI Interpretation: 0 points: Class I (very low risk - 0.4% complication rate)           Signed Electronically by: Nydia Dexter MD  Copy of this evaluation report is provided to requesting physician.

## 2022-02-10 NOTE — H&P (VIEW-ONLY)
[facility] [place]  [address]  [place] [address]  Phone: [phone]  Primary Provider: Nydia Dexter  Pre-op Performing Provider: NYDIA DEXTER      PREOPERATIVE EVALUATION:  Today's date: 2/11/2022    Atul Coker is a 67 year old male who presents for a preoperative evaluation.    Surgical Information:  Surgery/Procedure: Excision of multiple skin lesions on back/chest  [place] [facility] [place]  [place]geon: Dr. Groves  Surgery Date: 2/14/22  Time of Surgery: TBD  Where patient plans to recover: At home with family  Fax number for surgical facility: Note does not need to be faxed, will be available electronically in Epic.    Type of Anesthesia Anticipated: to be determined    Assessment & Plan     The proposed surgical procedure is considered LOW risk.    Preop general physical exam    - Comprehensive Metabolic Panel; Future  - CBC with platelets and differential; Future  - EKG 12-lead complete w/read - (Clinic Performed)  - Comprehensive Metabolic Panel  - CBC with platelets and differential    Skin cancer of anterior chest  Right upper lesion surgeon has concerns, left upper chest looked like basal cell ca to me      Possible Sleep Apnea: n/a     Risks and Recommendations:  The patient has the following additional risks and recommendations for perioperative complications:   - No identified additional risk factors other than previously addressed    Medication Instructions:  Patient is on no chronic medications    RECOMMENDATION:  APPROVAL GIVEN to proceed with proposed procedure, without further diagnostic evaluation.    Provider  Link to [facility] Help Grid :[id number]}    Subjective     HPI related to upcoming procedure:  Several lesions concerning, one in particular for possible skin cancer      Preop Questions 2/11/2022   1. Have you ever had a heart attack or stroke? No   2. Have you ever had surgery on your heart or blood vessels, such as a stent placement, a coronary artery bypass, or  surgery on an artery in your head, neck, heart, or legs? No   3. Do you have chest pain with activity? No   4. Do you have a history of  heart failure? No   5. Do you currently have a cold, bronchitis or symptoms of other infection? No   6. Do you have a cough, shortness of breath, or wheezing? No   7. Do you or anyone in your family have previous history of blood clots? No   8. Do you or does anyone in your family have a serious bleeding problem such as prolonged bleeding following surgeries or cuts? No   9. Have you ever had problems with anemia or been told to take iron pills? No   10. Have you had any abnormal blood loss such as black, tarry or bloody stools? No   11. Have you ever had a blood transfusion? No   12. Are you willing to have a blood transfusion if it is medically needed before, during, or after your surgery? Yes   13. Have you or any of your relatives ever had problems with anesthesia? YES - Go under easily, slow to wake up.    14. Do you have sleep apnea, excessive snoring or daytime drowsiness? YES - Snoring   14a. Do you have a CPAP machine? No   15. Do you have any artifical heart valves or other implanted medical devices like a pacemaker, defibrillator, or continuous glucose monitor? No   16. Do you have artificial joints? No   17. Are you allergic to latex? No     Health Care Directive:  Patient does not have a Health Care Directive or Living Will: Discussed advance care planning with patient; however, patient declined at this time.    Preoperative Review of :   reviewed - no record of controlled substances prescribed.      Status of Chronic Conditions:  See problem list for active medical problems.  Problems all longstanding and stable, except as noted/documented.  See ROS for pertinent symptoms related to these conditions.      Review of Systems  CONSTITUTIONAL: NEGATIVE for fever, chills, change in weight  INTEGUMENTARY/SKIN: NEGATIVE for worrisome rashes, moles or lesions  EYES:  NEGATIVE for vision changes or irritation  ENT/MOUTH: NEGATIVE for ear, mouth and throat problems  RESP: NEGATIVE for significant cough or SOB  CV: NEGATIVE for chest pain, palpitations or peripheral edema  GI: NEGATIVE for nausea, abdominal pain, heartburn, or change in bowel habits  : NEGATIVE for frequency, dysuria, or hematuria  MUSCULOSKELETAL: NEGATIVE for significant arthralgias or myalgia  NEURO: NEGATIVE for weakness, dizziness or paresthesias  ENDOCRINE: NEGATIVE for temperature intolerance, skin/hair changes  HEME: NEGATIVE for bleeding problems  PSYCHIATRIC: NEGATIVE for changes in mood or affect    Patient Active Problem List    Diagnosis Date Noted     Atypical pigmented lesion 01/05/2022     Priority: Medium     Bilateral carpal tunnel syndrome 01/05/2022     Priority: Medium     Weakness of both hands 01/05/2022     Priority: Medium     Paresthesias 01/05/2022     Priority: Medium     Hypovitaminosis D 01/05/2022     Priority: Medium     Muscle cramps 01/05/2022     Priority: Medium     Retinal vein occlusion of right eye 06/13/2019     Priority: Medium     Branch retinal vein occlusion of left eye with macular edema 06/13/2019     Priority: Medium     Cervical disc disorder at C6-C7 level with radiculopathy 06/13/2019     Priority: Medium     Screening for AAA (abdominal aortic aneurysm) 06/12/2019     Priority: Medium     Seasonal allergic rhinitis 06/19/2016     Priority: Medium     Chronic bilateral low back pain without sciatica 06/19/2016     Priority: Medium     GERD (gastroesophageal reflux disease) 03/26/2013     Priority: Medium     CMV (cytomegalovirus) positive (H) 03/26/2013     Priority: Medium     Hyperlipidemia LDL goal <130 03/26/2013     Priority: Medium      Past Medical History:   Diagnosis Date     Allergic rhinitis, cause unspecified 03/05/2013    seasonal     Branch retinal vein occlusion of both eyes     bilateral, still getting injections with vitreoretinal group     CMV  (cytomegalovirus infection) status positive (H)     remote     Concussion without loss of consciousness     fell skiing -- no memory issues or HAs, also had rotator cuff issues from fall     Hyperplastic colonic polyp 2019     Other abnormal glucose 03/05/2013    prediabetes     Pure hypercholesterolemia 01/24/2012     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     Past Surgical History:   Procedure Laterality Date     CARPAL TUNNEL RELEASE RT/LT Right 2018    Dr Hinton     COLONOSCOPY  08/07/2006    repeat in 10 yrs     COLONOSCOPY N/A 05/23/2019    due in 2024  Procedure: COLONOSCOPY WITH POLYPECTOMY;  Surgeon: David Groves MD;  Location: [facility]     HERNIA REPAIR, INGUINAL RT/LT Right 01/01/2013    Right side     UPPER GI ENDOSCOPY  2001    found vocal cord nodules     VASECTOMY  01/01/2013     Current Outpatient Medications   Medication Sig Dispense Refill     ASPIRIN PO Take 81 mg by mouth daily        azelastine (ASTELIN) 0.1 % nasal spray SPRAY 2 SPRAYS INTO BOTH NOSTRILS 2 TIMES DAILY 30 mL 2     Coenzyme Q10 (COQ10) 200 MG CAPS Take 1 capsule by mouth daily        Famotidine (PEPCID AC MAXIMUM STRENGTH PO) Take 20 mg by mouth daily (Patient not taking: Reported on 1/5/2022)       fexofenadine (ALLEGRA ALLERGY) 180 MG tablet Take 1 tablet by mouth daily.       guaiFENesin (MUCINEX) 600 MG 12 hr tablet Take 600 mg by mouth daily  (Patient not taking: Reported on 1/5/2022)       multivitamin, therapeutic with minerals (MULTI-VITAMIN) TABS Take 1 tablet by mouth daily        order for DME Equipment being ordered: Orthotics 1 Device 0     order for DME Equipment being ordered: Left stirrup brace        DX:M25.572 LEFT ANKLE PAIN 1 Units 1     rosuvastatin (CRESTOR) 10 MG tablet Take 1 tablet (10 mg) by mouth daily 90 tablet 3     vitamin E (TOCOPHEROL) 400 units (360 mg) capsule Take 1 capsule (400 Units) by mouth 2 times daily (Patient not taking: Reported on 1/12/2022) 100 capsule 3        Allergies   Allergen Reactions     Seasonal Allergies      Penicillins Rash        Social History     Tobacco Use     Smoking status: Never Smoker     Smokeless tobacco: Never Used   Substance Use Topics     Alcohol use: Yes     Alcohol/week: 7.0 standard drinks     Types: 7 Standard drinks or equivalent per week     Comment: occasionally     Family History   Problem Relation Age of Onset     C.A.D. Mother      Other - See Comments Mother         colon polyps     Hypertension Mother      Breast Cancer Mother 80     C.A.D. Father      Myocardial Infarction Father 56        myocardial infarction     Hypertension Sister      Hyperlipidemia Sister      Hypertension Sister      Hyperlipidemia Sister      Liver Disease Brother         end stage     Hypertension Brother      Hyperlipidemia Brother      Cerebrovascular Disease Maternal Grandmother      Other - See Comments Maternal Grandfather         accident     Heart Disease Paternal Grandmother      Heart Failure Paternal Grandfather      History   Drug Use No         Objective     /72 (BP Location: Right arm)   Pulse 78   Temp 98.3  F (36.8  C) (Tympanic)   Wt 90.2 kg (198 lb 12.8 oz)   SpO2 97%   BMI 29.36 kg/m      Physical Exam    GENERAL APPEARANCE: healthy, alert and no distress     EYES: EOMI,  PERRL     HENT: ear canals and TM's normal and nose and mouth without ulcers or lesions     NECK: no adenopathy, no asymmetry, masses, or scars and thyroid normal to palpation     RESP: lungs clear to auscultation - no rales, rhonchi or wheezes     CV: regular rates and rhythm, normal S1 S2, no S3 or S4 and no murmur, click or rub     ABDOMEN:  soft, nontender, no HSM or masses and bowel sounds normal     MS: extremities normal- no gross deformities noted, no evidence of inflammation in joints, FROM in all extremities.     SKIN: no suspicious lesions or rashes     NEURO: Normal strength and tone, sensory exam grossly normal, mentation intact and speech  normal     PSYCH: mentation appears normal. and affect normal/bright     LYMPHATICS: No cervical adenopathy    Recent Labs   Lab Test 01/05/22  1054 11/12/21  1030 11/16/20  0916   HGB  --  16.2 16.0   PLT  --  171 169    138 140   POTASSIUM 4.3 4.2 4.4   CR 1.09 1.13 1.16        Diagnostics:  Recent Results (from the past 24 hour(s))   Comprehensive Metabolic Panel    Collection Time: 02/11/22  9:25 AM   Result Value Ref Range    Sodium 139 133 - 144 mmol/L    Potassium 3.8 3.4 - 5.3 mmol/L    Chloride 107 94 - 109 mmol/L    Carbon Dioxide (CO2) 25 20 - 32 mmol/L    Anion Gap 7 3 - 14 mmol/L    Urea Nitrogen 12 7 - 30 mg/dL    Creatinine 1.05 0.66 - 1.25 mg/dL    Calcium 8.9 8.5 - 10.1 mg/dL    Glucose 116 (H) 70 - 99 mg/dL    Alkaline Phosphatase 142 40 - 150 U/L    AST 24 0 - 45 U/L    ALT 41 0 - 70 U/L    Protein Total 7.6 6.8 - 8.8 g/dL    Albumin 3.8 3.4 - 5.0 g/dL    Bilirubin Total 0.5 0.2 - 1.3 mg/dL    GFR Estimate 78 >60 mL/min/1.73m2   CBC with platelets and differential    Collection Time: 02/11/22  9:25 AM   Result Value Ref Range    WBC Count 5.7 4.0 - 11.0 10e3/uL    RBC Count 5.07 4.40 - 5.90 10e6/uL    Hemoglobin 16.2 13.3 - 17.7 g/dL    Hematocrit 47.8 40.0 - 53.0 %    MCV 94 78 - 100 fL    MCH 32.0 26.5 - 33.0 pg    MCHC 33.9 31.5 - 36.5 g/dL    RDW 13.1 10.0 - 15.0 %    Platelet Count 168 150 - 450 10e3/uL    % Neutrophils 62 %    % Lymphocytes 24 %    % Monocytes 7 %    % Eosinophils 6 %    % Basophils 1 %    % Immature Granulocytes 0 %    NRBCs per 100 WBC 0 <1 /100    Absolute Neutrophils 3.5 1.6 - 8.3 10e3/uL    Absolute Lymphocytes 1.4 0.8 - 5.3 10e3/uL    Absolute Monocytes 0.4 0.0 - 1.3 10e3/uL    Absolute Eosinophils 0.3 0.0 - 0.7 10e3/uL    Absolute Basophils 0.1 0.0 - 0.2 10e3/uL    Absolute Immature Granulocytes 0.0 <=0.4 10e3/uL    Absolute NRBCs 0.0 10e3/uL      EKG: appears normal, NSR, normal axis, normal intervals, no acute ST/T changes c/w ischemia, no LVH by voltage  criteria, there are no prior tracings available    Revised Cardiac Risk Index (RCRI):  The patient has the following serious cardiovascular risks for perioperative complications:   - No serious cardiac risks = 0 points     RCRI Interpretation: 0 points: Class I (very low risk - 0.4% complication rate)           Signed Electronically by: Nydia Dexter MD  Copy of this evaluation report is provided to requesting physician.

## 2022-02-10 NOTE — PROGRESS NOTES
[facility] [place]  [address]  [facility] [place] [address]  Phone: [phone]  Primary Provider: Nydia Dexter  Pre-op Performing Provider: NYDIA DEXTER      PREOPERATIVE EVALUATION:  Today's date: 2/11/2022    Atul Coker is a 67 year old male who presents for a preoperative evaluation.    Surgical Information:  Surgery/Procedure: Excision of multiple skin lesions on back/chest  Surgery Location: [facility] [place]  Surgeon: Dr. Groves  Surgery Date: 2/14/22  Time of Surgery: TBD  Where patient plans to recover: At home with family  Fax number for surgical facility: Note does not need to be faxed, will be available electronically in Epic.    Type of Anesthesia Anticipated: to be determined    Assessment & Plan     The proposed surgical procedure is considered LOW risk.    Preop general physical exam    - Comprehensive Metabolic Panel; Future  - CBC with platelets and differential; Future  - EKG 12-lead complete w/read - (Clinic Performed)  - Comprehensive Metabolic Panel  - CBC with platelets and differential    Skin cancer of anterior chest  Right upper lesion surgeon has concerns, left upper chest looked like basal cell ca to me      Possible Sleep Apnea: n/a     Risks and Recommendations:  The patient has the following additional risks and recommendations for perioperative complications:   - No identified additional risk factors other than previously addressed    Medication Instructions:  Patient is on no chronic medications    RECOMMENDATION:  APPROVAL GIVEN to proceed with proposed procedure, without further diagnostic evaluation.    Provider  Link to [facility] Help Grid :[id number]}    Subjective     HPI related to upcoming procedure:  Several lesions concerning, one in particular for possible skin cancer      Preop Questions 2/11/2022   1. Have you ever had a heart attack or stroke? No   2. Have you ever had surgery on your heart or blood vessels, such as a stent placement, a coronary artery bypass, or  surgery on an artery in your head, neck, heart, or legs? No   3. Do you have chest pain with activity? No   4. Do you have a history of  heart failure? No   5. Do you currently have a cold, bronchitis or symptoms of other infection? No   6. Do you have a cough, shortness of breath, or wheezing? No   7. Do you or anyone in your family have previous history of blood clots? No   8. Do you or does anyone in your family have a serious bleeding problem such as prolonged bleeding following surgeries or cuts? No   9. Have you ever had problems with anemia or been told to take iron pills? No   10. Have you had any abnormal blood loss such as black, tarry or bloody stools? No   11. Have you ever had a blood transfusion? No   12. Are you willing to have a blood transfusion if it is medically needed before, during, or after your surgery? Yes   13. Have you or any of your relatives ever had problems with anesthesia? YES - Go under easily, slow to wake up.    14. Do you have sleep apnea, excessive snoring or daytime drowsiness? YES - Snoring   14a. Do you have a CPAP machine? No   15. Do you have any artifical heart valves or other implanted medical devices like a pacemaker, defibrillator, or continuous glucose monitor? No   16. Do you have artificial joints? No   17. Are you allergic to latex? No     Health Care Directive:  Patient does not have a Health Care Directive or Living Will: Discussed advance care planning with patient; however, patient declined at this time.    Preoperative Review of :   reviewed - no record of controlled substances prescribed.      Status of Chronic Conditions:  See problem list for active medical problems.  Problems all longstanding and stable, except as noted/documented.  See ROS for pertinent symptoms related to these conditions.      Review of Systems  CONSTITUTIONAL: NEGATIVE for fever, chills, change in weight  INTEGUMENTARY/SKIN: NEGATIVE for worrisome rashes, moles or lesions  EYES:  NEGATIVE for vision changes or irritation  ENT/MOUTH: NEGATIVE for ear, mouth and throat problems  RESP: NEGATIVE for significant cough or SOB  CV: NEGATIVE for chest pain, palpitations or peripheral edema  GI: NEGATIVE for nausea, abdominal pain, heartburn, or change in bowel habits  : NEGATIVE for frequency, dysuria, or hematuria  MUSCULOSKELETAL: NEGATIVE for significant arthralgias or myalgia  NEURO: NEGATIVE for weakness, dizziness or paresthesias  ENDOCRINE: NEGATIVE for temperature intolerance, skin/hair changes  HEME: NEGATIVE for bleeding problems  PSYCHIATRIC: NEGATIVE for changes in mood or affect    Patient Active Problem List    Diagnosis Date Noted     Atypical pigmented lesion 01/05/2022     Priority: Medium     Bilateral carpal tunnel syndrome 01/05/2022     Priority: Medium     Weakness of both hands 01/05/2022     Priority: Medium     Paresthesias 01/05/2022     Priority: Medium     Hypovitaminosis D 01/05/2022     Priority: Medium     Muscle cramps 01/05/2022     Priority: Medium     Retinal vein occlusion of right eye 06/13/2019     Priority: Medium     Branch retinal vein occlusion of left eye with macular edema 06/13/2019     Priority: Medium     Cervical disc disorder at C6-C7 level with radiculopathy 06/13/2019     Priority: Medium     Screening for AAA (abdominal aortic aneurysm) 06/12/2019     Priority: Medium     Seasonal allergic rhinitis 06/19/2016     Priority: Medium     Chronic bilateral low back pain without sciatica 06/19/2016     Priority: Medium     GERD (gastroesophageal reflux disease) 03/26/2013     Priority: Medium     CMV (cytomegalovirus) positive (H) 03/26/2013     Priority: Medium     Hyperlipidemia LDL goal <130 03/26/2013     Priority: Medium      Past Medical History:   Diagnosis Date     Allergic rhinitis, cause unspecified 03/05/2013    seasonal     Branch retinal vein occlusion of both eyes     bilateral, still getting injections with vitreoretinal group     CMV  (cytomegalovirus infection) status positive (H)     remote     Concussion without loss of consciousness     fell skiing -- no memory issues or HAs, also had rotator cuff issues from fall     Hyperplastic colonic polyp 2019     Other abnormal glucose 03/05/2013    prediabetes     Pure hypercholesterolemia 01/24/2012     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     Past Surgical History:   Procedure Laterality Date     CARPAL TUNNEL RELEASE RT/LT Right 2018    Dr Hinton     COLONOSCOPY  08/07/2006    repeat in 10 yrs     COLONOSCOPY N/A 05/23/2019    due in 2024  Procedure: COLONOSCOPY WITH POLYPECTOMY;  Surgeon: David Groves MD;  Location: [facility]     HERNIA REPAIR, INGUINAL RT/LT Right 01/01/2013    Right side     UPPER GI ENDOSCOPY  2001    found vocal cord nodules     VASECTOMY  01/01/2013     Current Outpatient Medications   Medication Sig Dispense Refill     ASPIRIN PO Take 81 mg by mouth daily        azelastine (ASTELIN) 0.1 % nasal spray SPRAY 2 SPRAYS INTO BOTH NOSTRILS 2 TIMES DAILY 30 mL 2     Coenzyme Q10 (COQ10) 200 MG CAPS Take 1 capsule by mouth daily        Famotidine (PEPCID AC MAXIMUM STRENGTH PO) Take 20 mg by mouth daily (Patient not taking: Reported on 1/5/2022)       fexofenadine (ALLEGRA ALLERGY) 180 MG tablet Take 1 tablet by mouth daily.       guaiFENesin (MUCINEX) 600 MG 12 hr tablet Take 600 mg by mouth daily  (Patient not taking: Reported on 1/5/2022)       multivitamin, therapeutic with minerals (MULTI-VITAMIN) TABS Take 1 tablet by mouth daily        order for DME Equipment being ordered: Orthotics 1 Device 0     order for DME Equipment being ordered: Left stirrup brace        DX:M25.572 LEFT ANKLE PAIN 1 Units 1     rosuvastatin (CRESTOR) 10 MG tablet Take 1 tablet (10 mg) by mouth daily 90 tablet 3     vitamin E (TOCOPHEROL) 400 units (360 mg) capsule Take 1 capsule (400 Units) by mouth 2 times daily (Patient not taking: Reported on 1/12/2022) 100 capsule 3        Allergies   Allergen Reactions     Seasonal Allergies      Penicillins Rash        Social History     Tobacco Use     Smoking status: Never Smoker     Smokeless tobacco: Never Used   Substance Use Topics     Alcohol use: Yes     Alcohol/week: 7.0 standard drinks     Types: 7 Standard drinks or equivalent per week     Comment: occasionally     Family History   Problem Relation Age of Onset     C.A.D. Mother      Other - See Comments Mother         colon polyps     Hypertension Mother      Breast Cancer Mother 80     C.A.D. Father      Myocardial Infarction Father 56        myocardial infarction     Hypertension Sister      Hyperlipidemia Sister      Hypertension Sister      Hyperlipidemia Sister      Liver Disease Brother         end stage     Hypertension Brother      Hyperlipidemia Brother      Cerebrovascular Disease Maternal Grandmother      Other - See Comments Maternal Grandfather         accident     Heart Disease Paternal Grandmother      Heart Failure Paternal Grandfather      History   Drug Use No         Objective     /72 (BP Location: Right arm)   Pulse 78   Temp 98.3  F (36.8  C) (Tympanic)   Wt 90.2 kg (198 lb 12.8 oz)   SpO2 97%   BMI 29.36 kg/m      Physical Exam    GENERAL APPEARANCE: healthy, alert and no distress     EYES: EOMI,  PERRL     HENT: ear canals and TM's normal and nose and mouth without ulcers or lesions     NECK: no adenopathy, no asymmetry, masses, or scars and thyroid normal to palpation     RESP: lungs clear to auscultation - no rales, rhonchi or wheezes     CV: regular rates and rhythm, normal S1 S2, no S3 or S4 and no murmur, click or rub     ABDOMEN:  soft, nontender, no HSM or masses and bowel sounds normal     MS: extremities normal- no gross deformities noted, no evidence of inflammation in joints, FROM in all extremities.     SKIN: no suspicious lesions or rashes     NEURO: Normal strength and tone, sensory exam grossly normal, mentation intact and speech  normal     PSYCH: mentation appears normal. and affect normal/bright     LYMPHATICS: No cervical adenopathy    Recent Labs   Lab Test 01/05/22  1054 11/12/21  1030 11/16/20  0916   HGB  --  16.2 16.0   PLT  --  171 169    138 140   POTASSIUM 4.3 4.2 4.4   CR 1.09 1.13 1.16        Diagnostics:  Recent Results (from the past 24 hour(s))   Comprehensive Metabolic Panel    Collection Time: 02/11/22  9:25 AM   Result Value Ref Range    Sodium 139 133 - 144 mmol/L    Potassium 3.8 3.4 - 5.3 mmol/L    Chloride 107 94 - 109 mmol/L    Carbon Dioxide (CO2) 25 20 - 32 mmol/L    Anion Gap 7 3 - 14 mmol/L    Urea Nitrogen 12 7 - 30 mg/dL    Creatinine 1.05 0.66 - 1.25 mg/dL    Calcium 8.9 8.5 - 10.1 mg/dL    Glucose 116 (H) 70 - 99 mg/dL    Alkaline Phosphatase 142 40 - 150 U/L    AST 24 0 - 45 U/L    ALT 41 0 - 70 U/L    Protein Total 7.6 6.8 - 8.8 g/dL    Albumin 3.8 3.4 - 5.0 g/dL    Bilirubin Total 0.5 0.2 - 1.3 mg/dL    GFR Estimate 78 >60 mL/min/1.73m2   CBC with platelets and differential    Collection Time: 02/11/22  9:25 AM   Result Value Ref Range    WBC Count 5.7 4.0 - 11.0 10e3/uL    RBC Count 5.07 4.40 - 5.90 10e6/uL    Hemoglobin 16.2 13.3 - 17.7 g/dL    Hematocrit 47.8 40.0 - 53.0 %    MCV 94 78 - 100 fL    MCH 32.0 26.5 - 33.0 pg    MCHC 33.9 31.5 - 36.5 g/dL    RDW 13.1 10.0 - 15.0 %    Platelet Count 168 150 - 450 10e3/uL    % Neutrophils 62 %    % Lymphocytes 24 %    % Monocytes 7 %    % Eosinophils 6 %    % Basophils 1 %    % Immature Granulocytes 0 %    NRBCs per 100 WBC 0 <1 /100    Absolute Neutrophils 3.5 1.6 - 8.3 10e3/uL    Absolute Lymphocytes 1.4 0.8 - 5.3 10e3/uL    Absolute Monocytes 0.4 0.0 - 1.3 10e3/uL    Absolute Eosinophils 0.3 0.0 - 0.7 10e3/uL    Absolute Basophils 0.1 0.0 - 0.2 10e3/uL    Absolute Immature Granulocytes 0.0 <=0.4 10e3/uL    Absolute NRBCs 0.0 10e3/uL      EKG: appears normal, NSR, normal axis, normal intervals, no acute ST/T changes c/w ischemia, no LVH by voltage  criteria, there are no prior tracings available    Revised Cardiac Risk Index (RCRI):  The patient has the following serious cardiovascular risks for perioperative complications:   - No serious cardiac risks = 0 points     RCRI Interpretation: 0 points: Class I (very low risk - 0.4% complication rate)           Signed Electronically by: Nydia Dexter MD  Copy of this evaluation report is provided to requesting physician.

## 2022-02-11 ENCOUNTER — OFFICE VISIT (OUTPATIENT)
Dept: FAMILY MEDICINE | Facility: OTHER | Age: 67
End: 2022-02-11
Attending: FAMILY MEDICINE
Payer: COMMERCIAL

## 2022-02-11 VITALS
DIASTOLIC BLOOD PRESSURE: 72 MMHG | TEMPERATURE: 98.3 F | SYSTOLIC BLOOD PRESSURE: 118 MMHG | OXYGEN SATURATION: 97 % | HEART RATE: 78 BPM | WEIGHT: 198.8 LBS | BODY MASS INDEX: 29.36 KG/M2

## 2022-02-11 DIAGNOSIS — C44.509 SKIN CANCER OF ANTERIOR CHEST: ICD-10-CM

## 2022-02-11 DIAGNOSIS — Z01.818 PREOP GENERAL PHYSICAL EXAM: Primary | ICD-10-CM

## 2022-02-11 LAB
ALBUMIN SERPL-MCNC: 3.8 G/DL (ref 3.4–5)
ALP SERPL-CCNC: 142 U/L (ref 40–150)
ALT SERPL W P-5'-P-CCNC: 41 U/L (ref 0–70)
ANION GAP SERPL CALCULATED.3IONS-SCNC: 7 MMOL/L (ref 3–14)
AST SERPL W P-5'-P-CCNC: 24 U/L (ref 0–45)
BASOPHILS # BLD AUTO: 0.1 10E3/UL (ref 0–0.2)
BASOPHILS NFR BLD AUTO: 1 %
BILIRUB SERPL-MCNC: 0.5 MG/DL (ref 0.2–1.3)
BUN SERPL-MCNC: 12 MG/DL (ref 7–30)
CALCIUM SERPL-MCNC: 8.9 MG/DL (ref 8.5–10.1)
CHLORIDE BLD-SCNC: 107 MMOL/L (ref 94–109)
CO2 SERPL-SCNC: 25 MMOL/L (ref 20–32)
CREAT SERPL-MCNC: 1.05 MG/DL (ref 0.66–1.25)
EOSINOPHIL # BLD AUTO: 0.3 10E3/UL (ref 0–0.7)
EOSINOPHIL NFR BLD AUTO: 6 %
ERYTHROCYTE [DISTWIDTH] IN BLOOD BY AUTOMATED COUNT: 13.1 % (ref 10–15)
GFR SERPL CREATININE-BSD FRML MDRD: 78 ML/MIN/1.73M2
GLUCOSE BLD-MCNC: 116 MG/DL (ref 70–99)
HCT VFR BLD AUTO: 47.8 % (ref 40–53)
HGB BLD-MCNC: 16.2 G/DL (ref 13.3–17.7)
IMM GRANULOCYTES # BLD: 0 10E3/UL
IMM GRANULOCYTES NFR BLD: 0 %
LYMPHOCYTES # BLD AUTO: 1.4 10E3/UL (ref 0.8–5.3)
LYMPHOCYTES NFR BLD AUTO: 24 %
MCH RBC QN AUTO: 32 PG (ref 26.5–33)
MCHC RBC AUTO-ENTMCNC: 33.9 G/DL (ref 31.5–36.5)
MCV RBC AUTO: 94 FL (ref 78–100)
MONOCYTES # BLD AUTO: 0.4 10E3/UL (ref 0–1.3)
MONOCYTES NFR BLD AUTO: 7 %
NEUTROPHILS # BLD AUTO: 3.5 10E3/UL (ref 1.6–8.3)
NEUTROPHILS NFR BLD AUTO: 62 %
NRBC # BLD AUTO: 0 10E3/UL
NRBC BLD AUTO-RTO: 0 /100
PLATELET # BLD AUTO: 168 10E3/UL (ref 150–450)
POTASSIUM BLD-SCNC: 3.8 MMOL/L (ref 3.4–5.3)
PROT SERPL-MCNC: 7.6 G/DL (ref 6.8–8.8)
RBC # BLD AUTO: 5.07 10E6/UL (ref 4.4–5.9)
SARS-COV-2 RNA RESP QL NAA+PROBE: NEGATIVE
SODIUM SERPL-SCNC: 139 MMOL/L (ref 133–144)
WBC # BLD AUTO: 5.7 10E3/UL (ref 4–11)

## 2022-02-11 PROCEDURE — 99214 OFFICE O/P EST MOD 30 MIN: CPT | Performed by: FAMILY MEDICINE

## 2022-02-11 PROCEDURE — 93005 ELECTROCARDIOGRAM TRACING: CPT

## 2022-02-11 PROCEDURE — 36415 COLL VENOUS BLD VENIPUNCTURE: CPT | Mod: ZL | Performed by: FAMILY MEDICINE

## 2022-02-11 PROCEDURE — G0463 HOSPITAL OUTPT CLINIC VISIT: HCPCS | Mod: 25

## 2022-02-11 PROCEDURE — 80053 COMPREHEN METABOLIC PANEL: CPT | Mod: ZL | Performed by: FAMILY MEDICINE

## 2022-02-11 PROCEDURE — 93010 ELECTROCARDIOGRAM REPORT: CPT | Mod: 77 | Performed by: INTERNAL MEDICINE

## 2022-02-11 PROCEDURE — 85004 AUTOMATED DIFF WBC COUNT: CPT | Mod: ZL | Performed by: FAMILY MEDICINE

## 2022-02-11 PROCEDURE — G0463 HOSPITAL OUTPT CLINIC VISIT: HCPCS

## 2022-02-11 ASSESSMENT — PAIN SCALES - GENERAL: PAINLEVEL: NO PAIN (0)

## 2022-02-11 NOTE — PATIENT INSTRUCTIONS
Preparing for Your Surgery  Getting started  A nurse will call you to review your health history and instructions. They will give you an arrival time based on your scheduled surgery time. Please be ready to share:    Your doctor's clinic name and phone number    Your medical, surgical and anesthesia history    A list of allergies and sensitivities    A list of medicines, including herbal treatments and over-the-counter drugs    Whether the patient has a legal guardian (ask how to send us the papers in advance)  Please tell us if you're pregnant--or if there's any chance you might be pregnant. Some surgeries may injure a fetus (unborn baby), so they require a pregnancy test. Surgeries that are safe for a fetus don't always need a test, and you can choose whether to have one.   If you have a child who's having surgery, please ask for a copy of Preparing for Your Child's Surgery.    Preparing for surgery    Within 30 days of surgery: Have a pre-op exam (sometimes called an H&P, or History and Physical). This can be done at a clinic or pre-operative center.  ? If you're having a , you may not need this exam. Talk to your care team.    At your pre-op exam, talk to your care team about all medicines you take. If you need to stop any medicines before surgery, ask when to start taking them again.  ? We do this for your safety. Many medicines can make you bleed too much during surgery. Some change how well surgery (anesthesia) drugs work.    Call your insurance company to let them know you're having surgery. (If you don't have insurance, call [phone].)    Call your clinic if there's any change in your health. This includes signs of a cold or flu (sore throat, runny nose, cough, rash, fever). It also includes a scrape or scratch near the surgery site.    If you have questions on the day of surgery, call your hospital or surgery center.  COVID testing  You may need to be tested for COVID-19 before having  surgery. If so, your surgical team will give you instructions for scheduling this test, separate from your preoperative history and physical.  Eating and drinking guidelines  For your safety: Unless your surgeon tells you otherwise, follow the guidelines below.    Eat and drink as usual until 8 hours before surgery. After that, no food or milk.    Drink clear liquids until 2 hours before surgery. These are liquids you can see through, like water, Gatorade and Propel Water. You may also have black coffee and tea (no cream or milk).    Nothing by mouth within 2 hours of surgery. This includes gum, candy and breath mints.    If you drink alcohol: Stop drinking it the night before surgery.    If your care team tells you to take medicine on the morning of surgery, it's okay to take it with a sip of water.  Preventing infection    Shower or bathe the night before and morning of your surgery. Follow the instructions your clinic gave you. (If no instructions, use regular soap.)    Don't shave or clip hair near your surgery site. We'll remove the hair if needed.    Don't smoke or vape the morning of surgery. You may chew nicotine gum up to 2 hours before surgery. A nicotine patch is okay.  ? Note: Some surgeries require you to completely quit smoking and nicotine. Check with your surgeon.    Your care team will make every effort to keep you safe from infection. We will:  ? Clean our hands often with soap and water (or an alcohol-based hand rub).  ? Clean the skin at your surgery site with a special soap that kills germs.  ? Give you a special gown to keep you warm. (Cold raises the risk of infection.)  ? Wear special hair covers, masks, gowns and gloves during surgery.  ? Give antibiotic medicine, if prescribed. Not all surgeries need antibiotics.  What to bring on the day of surgery    Photo ID and insurance card    Copy of your health care directive, if you have one    Glasses and hearing aides (bring cases)  ? You can't  wear contacts during surgery    Inhaler and eye drops, if you use them (tell us about these when you arrive)    CPAP machine or breathing device, if you use them    A few personal items, if spending the night    If you have . . .  ? A pacemaker, ICD (cardiac defibrillator) or other implant: Bring the ID card.  ? An implanted stimulator: Bring the remote control.  ? A legal guardian: Bring a copy of the certified (court-stamped) guardianship papers.  Please remove any jewelry, including body piercings. Leave jewelry and other valuables at home.  If you're going home the day of surgery    You must have a responsible adult drive you home. They should stay with you overnight as well.    If you don't have someone to stay with you, and you aren't safe to go home alone, we may keep you overnight. Insurance often won't pay for this.  After surgery  If it's hard to control your pain or you need more pain medicine, please call your surgeon's office.  Questions?   If you have any questions for your care team, list them here: _________________________________________________________________________________________________________________________________________________________________________ ____________________________________ ____________________________________ ____________________________________  For informational purposes only. Not to replace the advice of your health care provider. Copyright   2003, 2019 [facility]. All rights reserved. Clinically reviewed by Jelena Miller MD. xkoto [id number] - REV 07/21.

## 2022-02-11 NOTE — NURSING NOTE
"Chief Complaint   Patient presents with     Pre-Op Exam       Initial /72 (BP Location: Right arm)   Pulse 78   Temp 98.3  F (36.8  C) (Tympanic)   Wt 90.2 kg (198 lb 12.8 oz)   SpO2 97%   BMI 29.36 kg/m   Estimated body mass index is 29.36 kg/m  as calculated from the following:    Height as of 1/12/22: 1.753 m (5' 9\").    Weight as of this encounter: 90.2 kg (198 lb 12.8 oz).  Medication Reconciliation: complete  Meera Rachel LPN  "

## 2022-02-14 ENCOUNTER — ANESTHESIA (OUTPATIENT)
Dept: SURGERY | Facility: HOSPITAL | Age: 67
End: 2022-02-14
Payer: MEDICARE

## 2022-02-14 ENCOUNTER — HOSPITAL ENCOUNTER (OUTPATIENT)
Facility: HOSPITAL | Age: 67
Discharge: HOME | End: 2022-02-14
Attending: SURGERY | Admitting: SURGERY
Payer: MEDICARE

## 2022-02-14 VITALS
WEIGHT: 190 LBS | OXYGEN SATURATION: 94 % | TEMPERATURE: 97 F | HEART RATE: 70 BPM | DIASTOLIC BLOOD PRESSURE: 91 MMHG | RESPIRATION RATE: 16 BRPM | HEIGHT: 69 IN | BODY MASS INDEX: 28.14 KG/M2 | SYSTOLIC BLOOD PRESSURE: 139 MMHG

## 2022-02-14 PROCEDURE — 272N000001 HC OR GENERAL SUPPLY STERILE: Performed by: SURGERY

## 2022-02-14 PROCEDURE — 250N000009 HC RX 250: Performed by: SURGERY

## 2022-02-14 PROCEDURE — 710N000012 HC RECOVERY PHASE 2, PER MINUTE: Performed by: SURGERY

## 2022-02-14 PROCEDURE — 88305 TISSUE EXAM BY PATHOLOGIST: CPT | Mod: TC | Performed by: SURGERY

## 2022-02-14 PROCEDURE — 250N000011 HC RX IP 250 OP 636: Performed by: NURSE ANESTHETIST, CERTIFIED REGISTERED

## 2022-02-14 PROCEDURE — 370N000017 HC ANESTHESIA TECHNICAL FEE, PER MIN: Performed by: SURGERY

## 2022-02-14 PROCEDURE — 11404 EXC TR-EXT B9+MARG 3.1-4 CM: CPT | Performed by: NURSE ANESTHETIST, CERTIFIED REGISTERED

## 2022-02-14 PROCEDURE — 360N000075 HC SURGERY LEVEL 2, PER MIN: Performed by: SURGERY

## 2022-02-14 PROCEDURE — 11626 EXC S/N/H/F/G MAL+MRG >4 CM: CPT | Performed by: SURGERY

## 2022-02-14 PROCEDURE — 258N000003 HC RX IP 258 OP 636: Performed by: NURSE ANESTHETIST, CERTIFIED REGISTERED

## 2022-02-14 PROCEDURE — 11404 EXC TR-EXT B9+MARG 3.1-4 CM: CPT | Performed by: SURGERY

## 2022-02-14 PROCEDURE — 999N000141 HC STATISTIC PRE-PROCEDURE NURSING ASSESSMENT: Performed by: SURGERY

## 2022-02-14 PROCEDURE — 250N000011 HC RX IP 250 OP 636: Performed by: SURGERY

## 2022-02-14 RX ORDER — NALOXONE HYDROCHLORIDE 0.4 MG/ML
0.4 INJECTION, SOLUTION INTRAMUSCULAR; INTRAVENOUS; SUBCUTANEOUS
Status: DISCONTINUED | OUTPATIENT
Start: 2022-02-14 | End: 2022-02-14 | Stop reason: HOSPADM

## 2022-02-14 RX ORDER — MEPERIDINE HYDROCHLORIDE 25 MG/ML
12.5 INJECTION INTRAMUSCULAR; INTRAVENOUS; SUBCUTANEOUS
Status: DISCONTINUED | OUTPATIENT
Start: 2022-02-14 | End: 2022-02-14 | Stop reason: HOSPADM

## 2022-02-14 RX ORDER — SODIUM CHLORIDE, SODIUM LACTATE, POTASSIUM CHLORIDE, CALCIUM CHLORIDE 600; 310; 30; 20 MG/100ML; MG/100ML; MG/100ML; MG/100ML
INJECTION, SOLUTION INTRAVENOUS CONTINUOUS
Status: DISCONTINUED | OUTPATIENT
Start: 2022-02-14 | End: 2022-02-14 | Stop reason: HOSPADM

## 2022-02-14 RX ORDER — PROPOFOL 10 MG/ML
INJECTION, EMULSION INTRAVENOUS CONTINUOUS PRN
Status: DISCONTINUED | OUTPATIENT
Start: 2022-02-14 | End: 2022-02-14

## 2022-02-14 RX ORDER — FENTANYL CITRATE 50 UG/ML
INJECTION, SOLUTION INTRAMUSCULAR; INTRAVENOUS PRN
Status: DISCONTINUED | OUTPATIENT
Start: 2022-02-14 | End: 2022-02-14

## 2022-02-14 RX ORDER — NALOXONE HYDROCHLORIDE 0.4 MG/ML
0.2 INJECTION, SOLUTION INTRAMUSCULAR; INTRAVENOUS; SUBCUTANEOUS
Status: DISCONTINUED | OUTPATIENT
Start: 2022-02-14 | End: 2022-02-14 | Stop reason: HOSPADM

## 2022-02-14 RX ORDER — FENTANYL CITRATE 50 UG/ML
50 INJECTION, SOLUTION INTRAMUSCULAR; INTRAVENOUS EVERY 5 MIN PRN
Status: DISCONTINUED | OUTPATIENT
Start: 2022-02-14 | End: 2022-02-14 | Stop reason: HOSPADM

## 2022-02-14 RX ORDER — FENTANYL CITRATE 50 UG/ML
25 INJECTION, SOLUTION INTRAMUSCULAR; INTRAVENOUS
Status: DISCONTINUED | OUTPATIENT
Start: 2022-02-14 | End: 2022-02-14 | Stop reason: HOSPADM

## 2022-02-14 RX ORDER — LABETALOL 20 MG/4 ML (5 MG/ML) INTRAVENOUS SYRINGE
10
Status: DISCONTINUED | OUTPATIENT
Start: 2022-02-14 | End: 2022-02-14 | Stop reason: HOSPADM

## 2022-02-14 RX ORDER — PROPOFOL 10 MG/ML
INJECTION, EMULSION INTRAVENOUS PRN
Status: DISCONTINUED | OUTPATIENT
Start: 2022-02-14 | End: 2022-02-14

## 2022-02-14 RX ORDER — LIDOCAINE 40 MG/G
CREAM TOPICAL
Status: DISCONTINUED | OUTPATIENT
Start: 2022-02-14 | End: 2022-02-14 | Stop reason: HOSPADM

## 2022-02-14 RX ORDER — CLINDAMYCIN PHOSPHATE 900 MG/50ML
900 INJECTION, SOLUTION INTRAVENOUS
Status: COMPLETED | OUTPATIENT
Start: 2022-02-14 | End: 2022-02-14

## 2022-02-14 RX ORDER — HYDRALAZINE HYDROCHLORIDE 20 MG/ML
2.5-5 INJECTION INTRAMUSCULAR; INTRAVENOUS EVERY 10 MIN PRN
Status: DISCONTINUED | OUTPATIENT
Start: 2022-02-14 | End: 2022-02-14 | Stop reason: HOSPADM

## 2022-02-14 RX ORDER — ONDANSETRON 4 MG/1
4 TABLET, ORALLY DISINTEGRATING ORAL EVERY 30 MIN PRN
Status: DISCONTINUED | OUTPATIENT
Start: 2022-02-14 | End: 2022-02-14 | Stop reason: HOSPADM

## 2022-02-14 RX ORDER — CLINDAMYCIN PHOSPHATE 900 MG/50ML
900 INJECTION, SOLUTION INTRAVENOUS SEE ADMIN INSTRUCTIONS
Status: DISCONTINUED | OUTPATIENT
Start: 2022-02-14 | End: 2022-02-14 | Stop reason: HOSPADM

## 2022-02-14 RX ORDER — ONDANSETRON 2 MG/ML
4 INJECTION INTRAMUSCULAR; INTRAVENOUS EVERY 30 MIN PRN
Status: DISCONTINUED | OUTPATIENT
Start: 2022-02-14 | End: 2022-02-14 | Stop reason: HOSPADM

## 2022-02-14 RX ADMIN — PROPOFOL 50 MCG/KG/MIN: 10 INJECTION, EMULSION INTRAVENOUS at 13:16

## 2022-02-14 RX ADMIN — FENTANYL CITRATE 50 MCG: 50 INJECTION, SOLUTION INTRAMUSCULAR; INTRAVENOUS at 13:14

## 2022-02-14 RX ADMIN — MIDAZOLAM 1 MG: 1 INJECTION INTRAMUSCULAR; INTRAVENOUS at 13:09

## 2022-02-14 RX ADMIN — PROPOFOL 20 MG: 10 INJECTION, EMULSION INTRAVENOUS at 13:15

## 2022-02-14 RX ADMIN — CLINDAMYCIN IN 5 PERCENT DEXTROSE 900 MG: 18 INJECTION, SOLUTION INTRAVENOUS at 13:09

## 2022-02-14 RX ADMIN — PROPOFOL 40 MG: 10 INJECTION, EMULSION INTRAVENOUS at 13:32

## 2022-02-14 RX ADMIN — SODIUM CHLORIDE, POTASSIUM CHLORIDE, SODIUM LACTATE AND CALCIUM CHLORIDE: 600; 310; 30; 20 INJECTION, SOLUTION INTRAVENOUS at 13:09

## 2022-02-14 RX ADMIN — MIDAZOLAM 1 MG: 1 INJECTION INTRAMUSCULAR; INTRAVENOUS at 13:13

## 2022-02-14 RX ADMIN — FENTANYL CITRATE 50 MCG: 50 INJECTION, SOLUTION INTRAMUSCULAR; INTRAVENOUS at 13:21

## 2022-02-14 ASSESSMENT — MIFFLIN-ST. JEOR: SCORE: 1632.21

## 2022-02-14 NOTE — OR NURSING
Patient and responsible adult given discharge instructions with no questions regarding instructions. Navjot score 20/20. Pain level 0/10.  Discharged from unit via ambulation. Patient discharged to home with brother.

## 2022-02-14 NOTE — ANESTHESIA POSTPROCEDURE EVALUATION
Patient: Atul Coker    Procedure: Procedure(s):  Excision of skin lesion on back  Excision of skin lesions chest x2       Diagnosis:Atypical pigmented skin lesion [L81.9]  Diagnosis Additional Information: No value filed.    Anesthesia Type:  MAC    Note:  Disposition: Outpatient   Postop Pain Control: Uneventful            Sign Out: Well controlled pain   PONV: No   Neuro/Psych: Uneventful            Sign Out: Acceptable/Baseline neuro status   Airway/Respiratory: Uneventful            Sign Out: Acceptable/Baseline resp. status   CV/Hemodynamics: Uneventful            Sign Out: Acceptable CV status; No obvious hypovolemia; No obvious fluid overload   Other NRE: NONE   DID A NON-ROUTINE EVENT OCCUR? No           Last vitals:  Vitals Value Taken Time   /91 02/14/22 1420   Temp 97  F (36.1  C) 02/14/22 1403   Pulse 63 02/14/22 1420   Resp 16 02/14/22 1415   SpO2 94 % 02/14/22 1429   Vitals shown include unvalidated device data.    Electronically Signed By: SHAHZAD Zayas CRNA  February 14, 2022  3:05 PM

## 2022-02-14 NOTE — OP NOTE
REPORT OF OPERATION  DATE OF PROCEDURE: 2/14/2022    PATIENT: Atul Coker    SURGERY PERFORMED: Excision of lesion on back, excision of lesion right chest, excision of lesion left neck     PREOPERATIVE DIAGNOSIS: Atypical lesions of the back right chest and left neck    POSTOPERATIVE DIAGNOSIS: Same    SURGEON: David Groves MD    ASSISTANTS: Elvira Luna CNP, Her assistance was required because of the technical aspects of the case    ANESTHESIA: Monitored Anesthesia Care    COMPLICATIONS: None apparent    TRANSFUSIONS: None    TISSUE TO PATHOLOGY: Lesion from back, lesion from right chest, lesion from left neck to pathology for pathological diagnosises    FINDINGS: 2 x 2 centimeter skin lesion of the mid back, 2 x 2 centimeter lesion of the right chest, 2 x 2 centimeter lesion left anterior neck.      INDICATIONS: This is a 67 year old male with several atypical lesions the patient will be to the operating room for excisional biopsies     DESCRIPTIONS OF PROCEDURE IN DETAIL: After consent was obtained the patient was taken to the operative suite and emmanuel in the left lateral decubitus position.  The patient was identified and the correct patient was confirmed.  Monitored Anesthesia Care was administered by anesthesia.  The patient was sterilely prepped and draped in the usual fashion.  A time out was performed verifying the correct patient and the correct procedure.  The entire operative team was in agreement.  All necessary equipment and supplies were in the room.    Starting on the back, the lesion was identified and the area was anesthestized with local infiltrative anesthesia.  The skin was then sharply entered and the lesion was removed.  Hemostasis was assured.  The wound was then closed with stainless steel staples.      The patient was then placed in the supine position and the neck and chest region were sterilely prepped with the usual fashion.  The area around the lesions were then  anesthetized and the lesions were removed in a similar fashion as to the one on the back.  The wounds were closed with stainless steel staples.  Sterile dressings were applied on all of the excisional sites.  All needle, sponge and instrument counts were correct x 2. The patient was awakened wand takened to the recovery room in stable condition tolerating th procedure well.

## 2022-02-14 NOTE — ANESTHESIA CARE TRANSFER NOTE
Patient: Atul Coker    Procedure: Procedure(s):  Excision of skin lesion on back  Excision of skin lesions chest x2       Diagnosis: Atypical pigmented skin lesion [L81.9]  Diagnosis Additional Information: No value filed.    Anesthesia Type:   MAC     Note:    Oropharynx: oropharynx clear of all foreign objects  Level of Consciousness: drowsy  Oxygen Supplementation: room air    Independent Airway: airway patency satisfactory and stable  Dentition: dentition unchanged  Vital Signs Stable: post-procedure vital signs reviewed and stable  Report to RN Given: handoff report given  Patient transferred to: Phase II    Handoff Report: Identifed the Patient, Identified the Reponsible Provider, Reviewed the pertinent medical history, Discussed the surgical course, Reviewed Intra-OP anesthesia mangement and issues during anesthesia, Set expectations for post-procedure period and Allowed opportunity for questions and acknowledgement of understanding      Vitals:  Vitals Value Taken Time   BP     Temp     Pulse     Resp     SpO2         Electronically Signed By: SHAHZAD Vieira CRNA  February 14, 2022  2:03 PM   Agree with the recs. He needs colonoscopy at this point.

## 2022-02-15 DIAGNOSIS — J30.1 CHRONIC SEASONAL ALLERGIC RHINITIS DUE TO POLLEN: ICD-10-CM

## 2022-02-16 RX ORDER — AZELASTINE 1 MG/ML
SPRAY, METERED NASAL
Qty: 30 ML | Refills: 11 | Status: SHIPPED | OUTPATIENT
Start: 2022-02-16 | End: 2022-11-02

## 2022-02-16 NOTE — TELEPHONE ENCOUNTER
astelin      Last Written Prescription Date:  10/11/21  Last Fill Quantity: 30 ml,   # refills: 2  Last Office Visit: 2/11/22  Future Office visit:

## 2022-02-17 ENCOUNTER — TELEPHONE (OUTPATIENT)
Dept: SURGERY | Facility: OTHER | Age: 67
End: 2022-02-17
Payer: COMMERCIAL

## 2022-02-17 DIAGNOSIS — Z01.818 PREOP TESTING: Primary | ICD-10-CM

## 2022-02-17 LAB
PATH REPORT.COMMENTS IMP SPEC: NORMAL
PATH REPORT.FINAL DX SPEC: NORMAL
PATH REPORT.GROSS SPEC: NORMAL
PATH REPORT.MICROSCOPIC SPEC OTHER STN: NORMAL
PATH REPORT.RELEVANT HX SPEC: NORMAL
PHOTO IMAGE: NORMAL

## 2022-02-17 PROCEDURE — 88305 TISSUE EXAM BY PATHOLOGIST: CPT | Mod: 26 | Performed by: PATHOLOGY

## 2022-02-17 NOTE — TELEPHONE ENCOUNTER
Called patient with pathology results and regarding need to schedule re-excision of basal cell carcinoma on neck. He would like to schedule for 3/7. COVID test scheduled and postop scheduled. Instructions reviewed over phone.    No case request or COVID test orders placed. Patient would like instructions sent via my-chart tomorrow and Hibiclens in mail.

## 2022-02-18 ENCOUNTER — PREP FOR PROCEDURE (OUTPATIENT)
Dept: SURGERY | Facility: OTHER | Age: 67
End: 2022-02-18
Payer: COMMERCIAL

## 2022-02-18 DIAGNOSIS — Z85.828 HISTORY OF BASAL CELL CARCINOMA: Primary | ICD-10-CM

## 2022-02-28 ENCOUNTER — ANESTHESIA EVENT (OUTPATIENT)
Dept: SURGERY | Facility: HOSPITAL | Age: 67
End: 2022-02-28
Payer: MEDICARE

## 2022-02-28 NOTE — ANESTHESIA PREPROCEDURE EVALUATION
Anesthesia Pre-Procedure Evaluation    Patient: Atul Coker   MRN: [id number] : 1955        Procedure : Procedure(s):  Re-Excision of Lesion on Neck, left          Past Medical History:   Diagnosis Date     Allergic rhinitis, cause unspecified 2013    seasonal     Branch retinal vein occlusion of both eyes     bilateral, still getting injections with vitreoretinal group     CMV (cytomegalovirus infection) status positive (H)     remote     Concussion without loss of consciousness     fell skiing -- no memory issues or HAs, also had rotator cuff issues from fall     Hyperplastic colonic polyp      Other abnormal glucose 2013    prediabetes     Pure hypercholesterolemia 2012     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      Past Surgical History:   Procedure Laterality Date     CARPAL TUNNEL RELEASE RT/LT Right 2018    Dr Hinton     COLONOSCOPY  2006    repeat in 10 yrs     COLONOSCOPY N/A 2019    due in   Procedure: COLONOSCOPY WITH POLYPECTOMY;  Surgeon: David Groves MD;  Location: [facility]     EXCISE LESION BACK N/A 2022    Procedure: Excision of skin lesion on back;  Surgeon: David Groves MD;  Location: [facility]     EXCISE LESION TRUNK N/A 2022    Procedure: Excision of skin lesions chest x2;  Surgeon: David Groves MD;  Location: [facility]     HERNIA REPAIR, INGUINAL RT/LT Right 2013    Right side     UPPER GI ENDOSCOPY      found vocal cord nodules     VASECTOMY  2013      Allergies   Allergen Reactions     Seasonal Allergies      Penicillins Rash      Social History     Tobacco Use     Smoking status: Never Smoker     Smokeless tobacco: Never Used   Substance Use Topics     Alcohol use: Yes     Alcohol/week: 7.0 standard drinks     Types: 7 Standard drinks or equivalent per week     Comment: occasionally      Wt Readings from Last 1 Encounters:   22 86.2 kg (190 lb)        Anesthesia  Evaluation   Pt has had prior anesthetic. Type: MAC.        ROS/MED HX  ENT/Pulmonary: Comment: Vocal cord nodules      (+) AMANDA risk factors, snores loudly, allergic rhinitis,     Neurologic:       Cardiovascular:     (+) Dyslipidemia -----Previous cardiac testing   Echo: Date: Results:    Stress Test: Date: Results:    ECG Reviewed: Date: 2/11/22 Results:  Appears normal, NSR, normal axis, normal intervals, no acute ST/T changes c/w ischemia, no LVH by voltage criteria, there are no prior tracings available  Cath: Date: Results:      METS/Exercise Tolerance:     Hematologic:       Musculoskeletal: Comment: Cervical disc disorder at C6-C7 level with radiculopathy      GI/Hepatic:     (+) GERD, Asymptomatic on medication,     Renal/Genitourinary:       Endo:       Psychiatric/Substance Use:       Infectious Disease:       Malignancy:   (+) Malignancy, History of Skin.Skin CA Active status post.        Other:      (+) , H/O Chronic Pain,        Physical Exam    Airway  airway exam normal      Mallampati: II   TM distance: > 3 FB   Neck ROM: full   Mouth opening: > 3 cm    Respiratory Devices and Support         Dental  no notable dental history         Cardiovascular   cardiovascular exam normal       Rhythm and rate: regular and normal     Pulmonary   pulmonary exam normal        breath sounds clear to auscultation           OUTSIDE LABS:  CBC:   Lab Results   Component Value Date    WBC 5.7 02/11/2022    WBC 7.1 11/12/2021    HGB 16.2 02/11/2022    HGB 16.2 11/12/2021    HCT 47.8 02/11/2022    HCT 47.4 11/12/2021     02/11/2022     11/12/2021     BMP:   Lab Results   Component Value Date     02/11/2022     01/05/2022    POTASSIUM 3.8 02/11/2022    POTASSIUM 4.3 01/05/2022    CHLORIDE 107 02/11/2022    CHLORIDE 109 01/05/2022    CO2 25 02/11/2022    CO2 24 01/05/2022    BUN 12 02/11/2022    BUN 10 01/05/2022    CR 1.05 02/11/2022    CR 1.09 01/05/2022     (H) 02/11/2022     (H)  01/05/2022     COAGS: No results found for: PTT, INR, FIBR  POC: No results found for: BGM, HCG, HCGS  HEPATIC:   Lab Results   Component Value Date    ALBUMIN 3.8 02/11/2022    PROTTOTAL 7.6 02/11/2022    ALT 41 02/11/2022    AST 24 02/11/2022    ALKPHOS 142 02/11/2022    BILITOTAL 0.5 02/11/2022     OTHER:   Lab Results   Component Value Date    FAVIO 8.9 02/11/2022    MAG 2.2 02/07/2020    TSH 1.18 11/12/2021       Anesthesia Plan    ASA Status:  2   NPO Status:  NPO Appropriate    Anesthesia Type: MAC.     - Reason for MAC: straight local not clinically adequate              Consents    Anesthesia Plan(s) and associated risks, benefits, and realistic alternatives discussed. Questions answered and patient/representative(s) expressed understanding.     - Discussed: Risks, Benefits and Alternatives for BOTH SEDATION and the PROCEDURE were discussed     - Discussed with:  Patient      - Extended Intubation/Ventilatory Support Discussed: No.      - Patient is DNR/DNI Status: No    Use of blood products discussed: No .     Postoperative Care            Comments:    Other Comments:  2/11/22            SHAHZAD Canales CRNA

## 2022-03-01 ENCOUNTER — OFFICE VISIT (OUTPATIENT)
Dept: SURGERY | Facility: OTHER | Age: 67
End: 2022-03-01
Attending: NURSE PRACTITIONER
Payer: COMMERCIAL

## 2022-03-01 VITALS
BODY MASS INDEX: 27.85 KG/M2 | WEIGHT: 188 LBS | HEART RATE: 73 BPM | DIASTOLIC BLOOD PRESSURE: 74 MMHG | OXYGEN SATURATION: 98 % | TEMPERATURE: 97.7 F | HEIGHT: 69 IN | SYSTOLIC BLOOD PRESSURE: 138 MMHG

## 2022-03-01 DIAGNOSIS — Z98.890 STATUS POST EXCISIONAL BIOPSY: Primary | ICD-10-CM

## 2022-03-01 PROCEDURE — G0463 HOSPITAL OUTPT CLINIC VISIT: HCPCS

## 2022-03-01 PROCEDURE — 99213 OFFICE O/P EST LOW 20 MIN: CPT | Performed by: NURSE PRACTITIONER

## 2022-03-01 ASSESSMENT — PAIN SCALES - GENERAL: PAINLEVEL: NO PAIN (0)

## 2022-03-01 NOTE — PATIENT INSTRUCTIONS
Thank you for allowing Elvira Luna N.P. and our surgical team to participate in your care. Please call our office at [phone] with scheduling questions or with any other questions or concerns.

## 2022-03-01 NOTE — NURSING NOTE
"Chief Complaint   Patient presents with     Surgical Followup     excision of chest and back skin lesions 2/14/2022. Scheduled for re-excision of basal cell from left neck carcinoma in operating room 3/7/2022.       Initial /74 (BP Location: Right arm, Patient Position: Chair, Cuff Size: Adult Large)   Pulse 73   Temp 97.7  F (36.5  C) (Tympanic)   Ht 1.753 m (5' 9\")   Wt 85.3 kg (188 lb)   SpO2 98%   BMI 27.76 kg/m   Estimated body mass index is 27.76 kg/m  as calculated from the following:    Height as of this encounter: 1.753 m (5' 9\").    Weight as of this encounter: 85.3 kg (188 lb).  Medication Reconciliation: complete  ANDRES ARAUZ LPN    "

## 2022-03-01 NOTE — PROGRESS NOTES
"CLINIC NOTE - POST-OP SURGERY  3/1/2022    Patient:Atul Coker    Procedure: Excision of lesion on back, excision of lesion right chest, excision of lesion left neck     This is a 67 year old male who is 2 weeks s/p Excision of lesion on back, excision of lesion right chest, excision of lesion left neck .  The patient has no complaints today.     Current Medications:  Current Outpatient Medications   Medication Sig Dispense Refill     ASPIRIN PO Take 81 mg by mouth daily        azelastine (ASTELIN) 0.1 % nasal spray use two sprays in each nostril twice daily 30 mL 11     BEVACIZUMAB, AVASTIN, INJECTION 2.5MG 1.25 mg by Intravitreal route 1.25mg right eye monthly, left eye every other month       Coenzyme Q10 (COQ10) 200 MG CAPS Take 1 capsule by mouth daily        Famotidine (PEPCID AC MAXIMUM STRENGTH PO) Take 20 mg by mouth daily        fexofenadine (ALLEGRA ALLERGY) 180 MG tablet Take 1 tablet by mouth daily.       multivitamin, therapeutic with minerals (MULTI-VITAMIN) TABS Take 1 tablet by mouth daily        order for DME Equipment being ordered: Orthotics 1 Device 0     order for DME Equipment being ordered: Left stirrup brace        DX:M25.572 LEFT ANKLE PAIN 1 Units 1     rosuvastatin (CRESTOR) 10 MG tablet Take 1 tablet (10 mg) by mouth daily 90 tablet 3       Allergies:  Allergies   Allergen Reactions     Seasonal Allergies      Penicillins Rash       PHYSICAL EXAM:   Vital signs: /74 (BP Location: Right arm, Patient Position: Chair, Cuff Size: Adult Large)   Pulse 73   Temp 97.7  F (36.5  C) (Tympanic)   Ht 1.753 m (5' 9\")   Wt 85.3 kg (188 lb)   SpO2 98%   BMI 27.76 kg/m     BMI: Body mass index is 27.76 kg/m .   General: Normal, healthy, cooperative, in no acute distress, alert   Respirations are non-labored   Abdominal: non-distended   Wounds:  Are intact with staples       PATHOLOGY:  A: Skin, upper back, excision  -Seborrheic keratosis     B: Skin, right chest, excision "   -Seborrheic keratosis     C: Skin, left neck, excision  -Basal cell carcinoma, nodular type  -Basal cell carcinoma is present in the tip sections    ASSESSMENT:    67 year old male who is 2 weeks s/p Excision of lesion on back, excision of lesion right chest, excision of lesion left neck .  Doing well.     PLAN:   Pathology report was discussed with the patient.  He is scheduled for re-excision of the neck on 3/7/22.  Staples were removed from the right chest and upper back without problems.    Follow-up 3/7/22 for re-excision of the neck lesion.  Sooner with problems/concerns.

## 2022-03-03 ENCOUNTER — OFFICE VISIT (OUTPATIENT)
Dept: FAMILY MEDICINE | Facility: OTHER | Age: 67
End: 2022-03-03
Attending: SURGERY
Payer: MEDICARE

## 2022-03-03 DIAGNOSIS — Z01.818 PREOP TESTING: ICD-10-CM

## 2022-03-03 PROCEDURE — U0005 INFEC AGEN DETEC AMPLI PROBE: HCPCS | Mod: ZL

## 2022-03-04 LAB — SARS-COV-2 RNA RESP QL NAA+PROBE: NEGATIVE

## 2022-03-07 ENCOUNTER — HOSPITAL ENCOUNTER (OUTPATIENT)
Facility: HOSPITAL | Age: 67
Discharge: HOME | End: 2022-03-07
Attending: SURGERY | Admitting: SURGERY
Payer: MEDICARE

## 2022-03-07 ENCOUNTER — ANESTHESIA (OUTPATIENT)
Dept: SURGERY | Facility: HOSPITAL | Age: 67
End: 2022-03-07
Payer: MEDICARE

## 2022-03-07 VITALS
HEART RATE: 62 BPM | WEIGHT: 192.8 LBS | DIASTOLIC BLOOD PRESSURE: 86 MMHG | HEIGHT: 69 IN | BODY MASS INDEX: 28.56 KG/M2 | OXYGEN SATURATION: 94 % | RESPIRATION RATE: 16 BRPM | SYSTOLIC BLOOD PRESSURE: 112 MMHG | TEMPERATURE: 97.4 F

## 2022-03-07 DIAGNOSIS — C44.509 SKIN CANCER OF ANTERIOR CHEST: Primary | ICD-10-CM

## 2022-03-07 PROCEDURE — 11426 EXC H-F-NK-SP B9+MARG >4 CM: CPT | Performed by: SURGERY

## 2022-03-07 PROCEDURE — 250N000011 HC RX IP 250 OP 636: Performed by: SURGERY

## 2022-03-07 PROCEDURE — 360N000076 HC SURGERY LEVEL 3, PER MIN: Performed by: SURGERY

## 2022-03-07 PROCEDURE — 258N000003 HC RX IP 258 OP 636: Performed by: NURSE ANESTHETIST, CERTIFIED REGISTERED

## 2022-03-07 PROCEDURE — 710N000012 HC RECOVERY PHASE 2, PER MINUTE: Performed by: SURGERY

## 2022-03-07 PROCEDURE — 250N000009 HC RX 250: Performed by: SURGERY

## 2022-03-07 PROCEDURE — 370N000017 HC ANESTHESIA TECHNICAL FEE, PER MIN: Performed by: SURGERY

## 2022-03-07 PROCEDURE — 250N000009 HC RX 250: Performed by: NURSE ANESTHETIST, CERTIFIED REGISTERED

## 2022-03-07 PROCEDURE — 999N000141 HC STATISTIC PRE-PROCEDURE NURSING ASSESSMENT: Performed by: SURGERY

## 2022-03-07 PROCEDURE — 88304 TISSUE EXAM BY PATHOLOGIST: CPT | Mod: TC | Performed by: SURGERY

## 2022-03-07 PROCEDURE — 250N000011 HC RX IP 250 OP 636: Performed by: NURSE ANESTHETIST, CERTIFIED REGISTERED

## 2022-03-07 PROCEDURE — 11403 EXC TR-EXT B9+MARG 2.1-3CM: CPT | Performed by: NURSE ANESTHETIST, CERTIFIED REGISTERED

## 2022-03-07 PROCEDURE — 272N000001 HC OR GENERAL SUPPLY STERILE: Performed by: SURGERY

## 2022-03-07 RX ORDER — LABETALOL 20 MG/4 ML (5 MG/ML) INTRAVENOUS SYRINGE
10
Status: DISCONTINUED | OUTPATIENT
Start: 2022-03-07 | End: 2022-03-07 | Stop reason: HOSPADM

## 2022-03-07 RX ORDER — FENTANYL CITRATE 50 UG/ML
INJECTION, SOLUTION INTRAMUSCULAR; INTRAVENOUS PRN
Status: DISCONTINUED | OUTPATIENT
Start: 2022-03-07 | End: 2022-03-07

## 2022-03-07 RX ORDER — NALOXONE HYDROCHLORIDE 0.4 MG/ML
0.2 INJECTION, SOLUTION INTRAMUSCULAR; INTRAVENOUS; SUBCUTANEOUS
Status: DISCONTINUED | OUTPATIENT
Start: 2022-03-07 | End: 2022-03-07 | Stop reason: HOSPADM

## 2022-03-07 RX ORDER — HYDRALAZINE HYDROCHLORIDE 20 MG/ML
2.5-5 INJECTION INTRAMUSCULAR; INTRAVENOUS EVERY 10 MIN PRN
Status: DISCONTINUED | OUTPATIENT
Start: 2022-03-07 | End: 2022-03-07 | Stop reason: HOSPADM

## 2022-03-07 RX ORDER — FENTANYL CITRATE 50 UG/ML
50 INJECTION, SOLUTION INTRAMUSCULAR; INTRAVENOUS EVERY 5 MIN PRN
Status: DISCONTINUED | OUTPATIENT
Start: 2022-03-07 | End: 2022-03-07 | Stop reason: HOSPADM

## 2022-03-07 RX ORDER — PROPOFOL 10 MG/ML
INJECTION, EMULSION INTRAVENOUS CONTINUOUS PRN
Status: DISCONTINUED | OUTPATIENT
Start: 2022-03-07 | End: 2022-03-07

## 2022-03-07 RX ORDER — CLINDAMYCIN PHOSPHATE 900 MG/50ML
900 INJECTION, SOLUTION INTRAVENOUS
Status: COMPLETED | OUTPATIENT
Start: 2022-03-07 | End: 2022-03-07

## 2022-03-07 RX ORDER — SODIUM CHLORIDE, SODIUM LACTATE, POTASSIUM CHLORIDE, CALCIUM CHLORIDE 600; 310; 30; 20 MG/100ML; MG/100ML; MG/100ML; MG/100ML
INJECTION, SOLUTION INTRAVENOUS CONTINUOUS
Status: DISCONTINUED | OUTPATIENT
Start: 2022-03-07 | End: 2022-03-07 | Stop reason: HOSPADM

## 2022-03-07 RX ORDER — PROPOFOL 10 MG/ML
INJECTION, EMULSION INTRAVENOUS PRN
Status: DISCONTINUED | OUTPATIENT
Start: 2022-03-07 | End: 2022-03-07

## 2022-03-07 RX ORDER — LIDOCAINE HYDROCHLORIDE 20 MG/ML
INJECTION, SOLUTION INFILTRATION; PERINEURAL PRN
Status: DISCONTINUED | OUTPATIENT
Start: 2022-03-07 | End: 2022-03-07

## 2022-03-07 RX ORDER — FENTANYL CITRATE 50 UG/ML
25 INJECTION, SOLUTION INTRAMUSCULAR; INTRAVENOUS
Status: DISCONTINUED | OUTPATIENT
Start: 2022-03-07 | End: 2022-03-07 | Stop reason: HOSPADM

## 2022-03-07 RX ORDER — MEPERIDINE HYDROCHLORIDE 25 MG/ML
12.5 INJECTION INTRAMUSCULAR; INTRAVENOUS; SUBCUTANEOUS
Status: DISCONTINUED | OUTPATIENT
Start: 2022-03-07 | End: 2022-03-07 | Stop reason: HOSPADM

## 2022-03-07 RX ORDER — ONDANSETRON 4 MG/1
4 TABLET, ORALLY DISINTEGRATING ORAL EVERY 30 MIN PRN
Status: DISCONTINUED | OUTPATIENT
Start: 2022-03-07 | End: 2022-03-07 | Stop reason: HOSPADM

## 2022-03-07 RX ORDER — NALOXONE HYDROCHLORIDE 0.4 MG/ML
0.4 INJECTION, SOLUTION INTRAMUSCULAR; INTRAVENOUS; SUBCUTANEOUS
Status: DISCONTINUED | OUTPATIENT
Start: 2022-03-07 | End: 2022-03-07 | Stop reason: HOSPADM

## 2022-03-07 RX ORDER — CLINDAMYCIN PHOSPHATE 900 MG/50ML
900 INJECTION, SOLUTION INTRAVENOUS SEE ADMIN INSTRUCTIONS
Status: DISCONTINUED | OUTPATIENT
Start: 2022-03-07 | End: 2022-03-07 | Stop reason: HOSPADM

## 2022-03-07 RX ORDER — ONDANSETRON 2 MG/ML
4 INJECTION INTRAMUSCULAR; INTRAVENOUS EVERY 30 MIN PRN
Status: DISCONTINUED | OUTPATIENT
Start: 2022-03-07 | End: 2022-03-07 | Stop reason: HOSPADM

## 2022-03-07 RX ORDER — HYDROCODONE BITARTRATE AND ACETAMINOPHEN 5; 325 MG/1; MG/1
1 TABLET ORAL EVERY 6 HOURS PRN
Qty: 10 TABLET | Refills: 0 | Status: SHIPPED | OUTPATIENT
Start: 2022-03-07 | End: 2022-03-10

## 2022-03-07 RX ORDER — LIDOCAINE 40 MG/G
CREAM TOPICAL
Status: DISCONTINUED | OUTPATIENT
Start: 2022-03-07 | End: 2022-03-07 | Stop reason: HOSPADM

## 2022-03-07 RX ADMIN — PROPOFOL 20 MG: 10 INJECTION, EMULSION INTRAVENOUS at 11:04

## 2022-03-07 RX ADMIN — FENTANYL CITRATE 25 MCG: 50 INJECTION, SOLUTION INTRAMUSCULAR; INTRAVENOUS at 11:04

## 2022-03-07 RX ADMIN — FENTANYL CITRATE 50 MCG: 50 INJECTION, SOLUTION INTRAMUSCULAR; INTRAVENOUS at 10:52

## 2022-03-07 RX ADMIN — PROPOFOL 40 MG: 10 INJECTION, EMULSION INTRAVENOUS at 10:50

## 2022-03-07 RX ADMIN — CLINDAMYCIN IN 5 PERCENT DEXTROSE 900 MG: 18 INJECTION, SOLUTION INTRAVENOUS at 10:48

## 2022-03-07 RX ADMIN — PROPOFOL 75 MCG/KG/MIN: 10 INJECTION, EMULSION INTRAVENOUS at 10:53

## 2022-03-07 RX ADMIN — SODIUM CHLORIDE, POTASSIUM CHLORIDE, SODIUM LACTATE AND CALCIUM CHLORIDE: 600; 310; 30; 20 INJECTION, SOLUTION INTRAVENOUS at 10:16

## 2022-03-07 RX ADMIN — LIDOCAINE HYDROCHLORIDE 40 MG: 20 INJECTION, SOLUTION INFILTRATION; PERINEURAL at 10:50

## 2022-03-07 NOTE — OR NURSING
Patient and responsible adult given discharge instructions with no questions regarding instructions. Navjot score 20/20. Pain level 0/10.  Discharged from unit via ambulation, tolerating PO diet. Patient discharged to home w/DG.

## 2022-03-07 NOTE — OP NOTE
REPORT OF OPERATION  DATE OF PROCEDURE: 3/7/2022    PATIENT: Atul Coker    SURGERY PERFORMED: Wide local excision of the left neck    PREOPERATIVE DIAGNOSIS: Basal cell carcinoma of the left neck with positive margins.  Need for wide local excision.     POSTOPERATIVE DIAGNOSIS: Same    SURGEON: David Groves MD    ASSISTANTS: Elvira Luna CNP, Her assistance was required because of the technical aspects of the case    ANESTHESIA: Monitored Anesthesia Care    COMPLICATIONS: None apparent    TRANSFUSIONS: None    TISSUE TO PATHOLOGY: Reexcision of previous biopsy from left neck to pathology for pathologic diagnosis.    FINDINGS: Evidence of excision from prior biopsy.     INDICATIONS: This is a 67 year old male with a 2 cm  X 2 cm basal cell carcinoma of the left neck.  This appeared previous excised.  Margins were positive.   The patient will be taken to the operating room for a reexcision of the biopsy site.     DESCRIPTIONS OF PROCEDURE IN DETAIL: After consent was obtained the patient was taken to the operative suite and emmanuel in the supine position.  The patient was identified and the correct patient was confirmed.  Monitored Anesthesia Care was given by anesthesia.  The patient was sterilely prepped and draped in the usual fashion.  A time out was performed verifying the correct patient and the correct procedure.  The entire operative team was in agreement.  All necessary equipment and supplies were in the room.    After identification of the lesion the skin was anesthetized with local infultrative anesthesia.  Through an 2 x 6 cm ellipsoid incision the skinskin was then sharply entered and dissection was carried down to the underlying fat.  The lesion was then removed in its entirely.  Hemostasis was assured.  The lesion was then marked with suture for orientation (Short = Superior aspect, Medium = Medial aspect, Long = Lateral aspect).  The lesion was sent to pathology for pathological  diagnosis.  Hemostasis was assured.  The skin was closed with vertical mattresses of 2-0 nylon and the skin edges were brought together with 4-0 nylon.  Sterile dressing were applied.  The patient was awaken in the operating room and taken to the recovery room in stable condition tolerating the procedure well

## 2022-03-07 NOTE — DISCHARGE INSTRUCTIONS
POST OPERATIVE PATIENT INFORMATION  Removal of Lesions/Neck Masses    A.  Keep the operated area dry unless otherwise noted.    B. Avoid or minimize facial movements around the operated area by limited chewing and facial expressions.  Also limit movement around any lesion removed from other body areas.    C. If you have a pressure dressing (large dressing or head wrap) over your incision, you may remove it carefully in 24 hours.    D. Steri-strips (tapes on the skin) should be left in place as long as possible.  If the tapes curl up before your first scheduled postoperative visit, then trim the edges with a scissors, close to the skin, to help avoid this coming off too soon (within the first 48 to 72 hours after surgery), call our office during business hours, and we will give you instructions.    E. If your incisions were not taped, you should cleanse the area 4-5 times a day with hydrogen peroxide and tap water at a 50/50 ratio, and then apply an antibiotic ointment.  If none is prescribed, purchase Bacitracin or Polysporin, over the counter and the pharmacy.  (No Neosporin)    F. If a crust forms around the incision line, or over the area, do not remove it, except by cleaning - See #E.    G. Notify the office if you have pain of increasing intensity or pain not relieved by prescribed medication (if given), or over-the-counter Tylenol tablets.    H. Some swelling or redness may be expected just after the procedure.  Notify the office if this swelling or redness worsens.    I. Contact the office to schedule your postoperative visit for suture removal (5-10 days) or staple removal (10-14 days).  You may be instructed to apply steri-strips across your incision lines at night for the first month after your suture removal.  This will keep tension off the area for a longer period of time during the healing process.    J. Anyone having a general anesthetic should not participate in any activity requiring mental  alertness, physical coordination, or balance; e.g., driving, bicycling, etc., for 24 hours after the anesthetic.    K. Avoid alcoholic beverages for the first 24 hours after surgery.    L. Cosmetics may be used over the operated areas beginning 24 hours after all sutures and/or staples are removed if no open wounds or scabs are present.  During the healing process, good skin care is recommended in the operated area.  If you routinely use creams or lotions on your face, it is acceptable to use them on the healing incision as well.  All applications near or over the incision lines should be made parallel to the incision line in order to minimize tension on the tissues.    M. What to expect:  All incisions leave scars.  Your scar will be designed and placed, as much as possible, to blend with your facial contours.  A  good scar  is one which is no more than a fine line, level and even with the surrounding skin, and causes no contracture, pull or distortion on the surrounding structures.  Scars will mature and change toward this end for up to one year after your procedure.  Some areas of the face and neck due to thickness of skin, tension or movement, may scar more than others.    N. Sutures and staples may be removed within 5 to 10 days - 5-7 days for facial and 10-14 days for other areas.    If you are uncertain about any of the above items or have any questions, please contact the [facility] at [phone].  After hours, please contact the Emergency Room at [phone].     Post-Anesthesia Patient Instructions    IMMEDIATELY FOLLOWING SURGERY:  Do not drive or operate machinery for the first twenty four hours after surgery.  Do not make any important decisions for twenty four hours after surgery or while taking narcotic pain medications or sedatives.  If you develop intractable nausea and vomiting or a severe headache please notify your doctor immediately.    FOLLOW-UP:  Please make an appointment  with your surgeon as instructed. You do not need to follow up with anesthesia unless specifically instructed to do so.    WOUND CARE INSTRUCTIONS (if applicable):  Keep a dry clean dressing on the anesthesia/puncture wound site if there is drainage.  Once the wound has quit draining you may leave it open to air.  Generally you should leave the bandage intact for twenty four hours unless there is drainage.  If the epidural site drains for more than 36-48 hours please call the anesthesia department.    QUESTIONS?:  Please feel free to call your physician or the hospital  if you have any questions, and they will be happy to assist you.      chest pain

## 2022-03-07 NOTE — ANESTHESIA CARE TRANSFER NOTE
Patient: Atul Coker    Procedure: Procedure(s):  Re-Excision of Lesion on Neck, left       Diagnosis: History of basal cell carcinoma [Z85.828]  Diagnosis Additional Information: No value filed.    Anesthesia Type:   MAC     Note:    Oropharynx: oropharynx clear of all foreign objects  Level of Consciousness: awake  Oxygen Supplementation: room air    Independent Airway: airway patency satisfactory and stable  Dentition: dentition unchanged  Vital Signs Stable: post-procedure vital signs reviewed and stable  Report to RN Given: handoff report given  Patient transferred to: Phase II    Handoff Report: Identifed the Patient, Identified the Reponsible Provider, Reviewed the pertinent medical history, Discussed the surgical course, Reviewed Intra-OP anesthesia mangement and issues during anesthesia, Set expectations for post-procedure period and Allowed opportunity for questions and acknowledgement of understanding      Vitals:  Vitals Value Taken Time   BP     Temp     Pulse     Resp     SpO2         Electronically Signed By: SHAHZAD Sharma CRNA  March 7, 2022  11:23 AM

## 2022-03-07 NOTE — ANESTHESIA POSTPROCEDURE EVALUATION
Patient: Atul Coker    Procedure: Procedure(s):  Re-Excision of Lesion on Neck, left       Anesthesia Type:  MAC    Note:  Disposition: Outpatient   Postop Pain Control: Uneventful            Sign Out: Well controlled pain   PONV: No   Neuro/Psych: Uneventful            Sign Out: Acceptable/Baseline neuro status   Airway/Respiratory: Uneventful            Sign Out: Acceptable/Baseline resp. status   CV/Hemodynamics: Uneventful            Sign Out: Acceptable CV status; No obvious hypovolemia; No obvious fluid overload   Other NRE: NONE   DID A NON-ROUTINE EVENT OCCUR? No           Last vitals:  Vitals Value Taken Time   /82 03/07/22 1150   Temp 97.4  F (36.3  C) 03/07/22 1125   Pulse 63 03/07/22 1150   Resp 16 03/07/22 1145   SpO2 95 % 03/07/22 1152   Vitals shown include unvalidated device data.    Electronically Signed By: SHAHZAD Zayas CRNA  March 7, 2022  12:09 PM

## 2022-03-09 LAB
PATH REPORT.COMMENTS IMP SPEC: NORMAL
PATH REPORT.COMMENTS IMP SPEC: NORMAL
PATH REPORT.FINAL DX SPEC: NORMAL
PATH REPORT.GROSS SPEC: NORMAL
PATH REPORT.MICROSCOPIC SPEC OTHER STN: NORMAL
PATH REPORT.RELEVANT HX SPEC: NORMAL
PHOTO IMAGE: NORMAL

## 2022-03-09 PROCEDURE — 88304 TISSUE EXAM BY PATHOLOGIST: CPT | Mod: 26 | Performed by: PATHOLOGY

## 2022-03-22 ENCOUNTER — TRANSFERRED RECORDS (OUTPATIENT)
Dept: HEALTH INFORMATION MANAGEMENT | Facility: CLINIC | Age: 67
End: 2022-03-22

## 2022-03-23 ENCOUNTER — OFFICE VISIT (OUTPATIENT)
Dept: SURGERY | Facility: OTHER | Age: 67
End: 2022-03-23
Attending: NURSE PRACTITIONER
Payer: MEDICARE

## 2022-03-23 VITALS
WEIGHT: 192 LBS | TEMPERATURE: 96.3 F | SYSTOLIC BLOOD PRESSURE: 122 MMHG | HEIGHT: 69 IN | DIASTOLIC BLOOD PRESSURE: 72 MMHG | BODY MASS INDEX: 28.44 KG/M2 | OXYGEN SATURATION: 98 % | HEART RATE: 77 BPM

## 2022-03-23 DIAGNOSIS — Z98.890 STATUS POST EXCISIONAL BIOPSY: Primary | ICD-10-CM

## 2022-03-23 PROCEDURE — G0463 HOSPITAL OUTPT CLINIC VISIT: HCPCS

## 2022-03-23 PROCEDURE — 99024 POSTOP FOLLOW-UP VISIT: CPT | Performed by: NURSE PRACTITIONER

## 2022-03-23 ASSESSMENT — PAIN SCALES - GENERAL: PAINLEVEL: NO PAIN (0)

## 2022-03-23 NOTE — NURSING NOTE
"Chief Complaint   Patient presents with     Surgical Followup     excision of left neck lesion 3/7/2022       Initial /72   Pulse 77   Temp (!) 96.3  F (35.7  C)   Ht 1.753 m (5' 9\")   Wt 87.1 kg (192 lb)   SpO2 98%   BMI 28.35 kg/m   Estimated body mass index is 28.35 kg/m  as calculated from the following:    Height as of this encounter: 1.753 m (5' 9\").    Weight as of this encounter: 87.1 kg (192 lb).  Medication Reconciliation: complete  Farnaz Marcos LPN    "

## 2022-03-23 NOTE — PATIENT INSTRUCTIONS
Thank you for allowing Elvira Luna CNP and our surgical team to participate in your care. Please call our health unit coordinator at [phone] with scheduling questions or the nurse at [phone] with any other questions or concerns.

## 2022-04-04 NOTE — PROGRESS NOTES
[facility] [place]  [address]  [facility] [address]  Phone: [phone]  Primary Provider: Nydia Dexter  Pre-op Performing Provider: NYDIA DEXTER    PREOPERATIVE EVALUATION:  Today's date: 4/6/2022    Atul Coker is a 67 year old male who presents for a preoperative evaluation.    Surgical Information:  Surgery/Procedure: Carpel Tunnel Surgery  Surgery Location: [facility]  Surgeon: Dr Allison  Surgery Date: 4/11/2022  Time of Surgery: TBD  Where patient plans to recover: At home with family  Fax number for surgical facility: N/A    Type of Anesthesia Anticipated: to be determined    Assessment & Plan     The proposed surgical procedure is considered LOW risk.    Preop testing  Cleared for surgery  - CBC with platelets and differential; Future  - Comprehensive metabolic panel; Future  - CBC with platelets and differential  - Comprehensive metabolic panel    (G56.03) Bilateral carpal tunnel syndrome  Comment:   Plan: having both hands done at the same time.      Possible Sleep Apnea: N/A {     Risks and Recommendations:  The patient has the following additional risks and recommendations for perioperative complications:   - No identified additional risk factors other than previously addressed    Medication Instructions:  Patient is to take all scheduled medications on the day of surgery EXCEPT for modifications listed below:   - Statins: Continue taking on the day of surgery.     RECOMMENDATION:  APPROVAL GIVEN to proceed with proposed procedure, without further diagnostic evaluation.    Subjective     HPI related to upcoming procedure: Bilateral carpal tunnel syndrome -- needs surgical repair      Preop Questions 4/6/2022   1. Have you ever had a heart attack or stroke? No   2. Have you ever had surgery on your heart or blood vessels, such as a stent placement, a coronary artery bypass, or surgery on an artery in your head, neck, heart, or legs? No   3. Do you have  chest pain with activity? No   4. Do you have a history of  heart failure? No   5. Do you currently have a cold, bronchitis or symptoms of other infection? No   6. Do you have a cough, shortness of breath, or wheezing? No   7. Do you or anyone in your family have previous history of blood clots? No   8. Do you or does anyone in your family have a serious bleeding problem such as prolonged bleeding following surgeries or cuts? No   9. Have you ever had problems with anemia or been told to take iron pills? No   10. Have you had any abnormal blood loss such as black, tarry or bloody stools? No   11. Have you ever had a blood transfusion? No   12. Are you willing to have a blood transfusion if it is medically needed before, during, or after your surgery? Yes   13. Have you or any of your relatives ever had problems with anesthesia? UNKNOWN -    14. Do you have sleep apnea, excessive snoring or daytime drowsiness? Snores but never been told he has AMANDA   14a. Do you have a CPAP machine? -n/a   15. Do you have any artifical heart valves or other implanted medical devices like a pacemaker, defibrillator, or continuous glucose monitor? No   16. Do you have artificial joints? No   17. Are you allergic to latex? No     Health Care Directive:  Patient does not have a Health Care Directive or Living Will: Discussed advance care planning with patient; information given to patient to review.    Preoperative Review of :   reviewed - no record of controlled substances prescribed.      Status of Chronic Conditions:  See problem list for active medical problems.  Problems all longstanding and stable, except as noted/documented.  See ROS for pertinent symptoms related to these conditions.      Review of Systems  CONSTITUTIONAL: NEGATIVE for fever, chills, change in weight  INTEGUMENTARY/SKIN: NEGATIVE for worrisome rashes, moles or lesions  EYES: NEGATIVE for vision changes or irritation  ENT/MOUTH: NEGATIVE for ear, mouth and  throat problems  RESP: NEGATIVE for significant cough or SOB  CV: NEGATIVE for chest pain, palpitations or peripheral edema  GI: NEGATIVE for nausea, abdominal pain, heartburn, or change in bowel habits  : NEGATIVE for frequency, dysuria, or hematuria  MUSCULOSKELETAL: NEGATIVE for significant arthralgias or myalgia  NEURO: NEGATIVE for weakness, dizziness or paresthesias  ENDOCRINE: NEGATIVE for temperature intolerance, skin/hair changes  HEME: NEGATIVE for bleeding problems  PSYCHIATRIC: NEGATIVE for changes in mood or affect    Patient Active Problem List    Diagnosis Date Noted     Skin cancer of anterior chest 02/11/2022     Priority: Medium     Atypical pigmented lesion 01/05/2022     Priority: Medium     Bilateral carpal tunnel syndrome 01/05/2022     Priority: Medium     Weakness of both hands 01/05/2022     Priority: Medium     Paresthesias 01/05/2022     Priority: Medium     Hypovitaminosis D 01/05/2022     Priority: Medium     Muscle cramps 01/05/2022     Priority: Medium     Retinal vein occlusion of right eye 06/13/2019     Priority: Medium     Branch retinal vein occlusion of left eye with macular edema 06/13/2019     Priority: Medium     Cervical disc disorder at C6-C7 level with radiculopathy 06/13/2019     Priority: Medium     Screening for AAA (abdominal aortic aneurysm) 06/12/2019     Priority: Medium     Seasonal allergic rhinitis 06/19/2016     Priority: Medium     Chronic bilateral low back pain without sciatica 06/19/2016     Priority: Medium     GERD (gastroesophageal reflux disease) 03/26/2013     Priority: Medium     CMV (cytomegalovirus) positive (H) 03/26/2013     Priority: Medium     Hyperlipidemia LDL goal <130 03/26/2013     Priority: Medium      Past Medical History:   Diagnosis Date     Allergic rhinitis, cause unspecified 03/05/2013    seasonal     Branch retinal vein occlusion of both eyes     bilateral, still getting injections with vitreoretinal group     CMV (cytomegalovirus  infection) status positive (H)     remote     Concussion without loss of consciousness     fell skiing -- no memory issues or HAs, also had rotator cuff issues from fall     Hyperplastic colonic polyp 2019     Other abnormal glucose 03/05/2013    prediabetes     Pure hypercholesterolemia 01/24/2012     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     Past Surgical History:   Procedure Laterality Date     CARPAL TUNNEL RELEASE RT/LT Right 2018    Dr Hinton     COLONOSCOPY  08/07/2006    repeat in 10 yrs     COLONOSCOPY N/A 05/23/2019    due in 2024  Procedure: COLONOSCOPY WITH POLYPECTOMY;  Surgeon: David Groves MD;  Location: [facility]     EXCISE LESION BACK N/A 2/14/2022    Procedure: Excision of skin lesion on back;  Surgeon: David Groves MD;  Location: [facility]     EXCISE LESION NECK Left 3/7/2022    Procedure: Re-Excision of Lesion on Neck, left;  Surgeon: David Groves MD;  Location: [facility]     EXCISE LESION TRUNK N/A 2/14/2022    Procedure: Excision of skin lesions chest x2;  Surgeon: David Groves MD;  Location: [facility]     HERNIA REPAIR, INGUINAL RT/LT Right 01/01/2013    Right side     UPPER GI ENDOSCOPY  2001    found vocal cord nodules     VASECTOMY  01/01/2013     Current Outpatient Medications   Medication Sig Dispense Refill     azelastine (ASTELIN) 0.1 % nasal spray use two sprays in each nostril twice daily 30 mL 11     BEVACIZUMAB, AVASTIN, INJECTION 2.5MG 1.25 mg by Intravitreal route 1.25mg right eye monthly, left eye every other month       fexofenadine (ALLEGRA ALLERGY) 180 MG tablet Take 1 tablet by mouth daily.       rosuvastatin (CRESTOR) 10 MG tablet Take 1 tablet (10 mg) by mouth daily 90 tablet 3     ASPIRIN PO Take 81 mg by mouth daily  (Patient not taking: Reported on 4/6/2022)       Coenzyme Q10 (COQ10) 200 MG CAPS Take 1 capsule by mouth daily  (Patient not taking: Reported on 4/6/2022)       Famotidine (PEPCID AC MAXIMUM STRENGTH PO) Take 20  "mg by mouth daily  (Patient not taking: Reported on 3/23/2022)       multivitamin, therapeutic with minerals (MULTI-VITAMIN) TABS Take 1 tablet by mouth daily  (Patient not taking: Reported on 4/6/2022)         Allergies   Allergen Reactions     Seasonal Allergies      Penicillins Rash        Social History     Tobacco Use     Smoking status: Never Smoker     Smokeless tobacco: Never Used   Substance Use Topics     Alcohol use: Yes     Alcohol/week: 7.0 standard drinks     Types: 7 Standard drinks or equivalent per week     Comment: occasionally     Family History   Problem Relation Age of Onset     C.A.D. Mother      Other - See Comments Mother         colon polyps     Hypertension Mother      Breast Cancer Mother 80     C.A.D. Father      Myocardial Infarction Father 56        myocardial infarction     Hypertension Sister      Hyperlipidemia Sister      Hypertension Sister      Hyperlipidemia Sister      Liver Disease Brother         end stage     Hypertension Brother      Hyperlipidemia Brother      Cerebrovascular Disease Maternal Grandmother      Other - See Comments Maternal Grandfather         accident     Heart Disease Paternal Grandmother      Heart Failure Paternal Grandfather      History   Drug Use No         Objective     /70 (BP Location: Right arm, Patient Position: Sitting, Cuff Size: Adult Regular)   Pulse 65   Temp 98.2  F (36.8  C) (Tympanic)   Ht 1.753 m (5' 9\")   Wt 89.2 kg (196 lb 9.6 oz)   SpO2 96%   BMI 29.03 kg/m      Physical Exam    GENERAL APPEARANCE: healthy, alert and no distress     EYES: EOMI,  PERRL     HENT: ear canals and TM's normal and nose and mouth without ulcers or lesions     NECK: no adenopathy, no asymmetry, masses, or scars and thyroid normal to palpation     RESP: lungs clear to auscultation - no rales, rhonchi or wheezes     CV: regular rates and rhythm, normal S1 S2, no S3 or S4 and no murmur, click or rub     ABDOMEN:  soft, nontender, no HSM or masses and " bowel sounds normal     MS: extremities normal- no gross deformities noted, no evidence of inflammation in joints, FROM in all extremities.     SKIN: no suspicious lesions or rashes     NEURO: Normal strength and tone, sensory exam grossly normal, mentation intact and speech normal     PSYCH: mentation appears normal. and affect normal/bright     LYMPHATICS: No cervical adenopathy    Recent Labs   Lab Test 02/11/22  0925 01/05/22  1054 11/12/21  1030   HGB 16.2  --  16.2     --  171    139 138   POTASSIUM 3.8 4.3 4.2   CR 1.05 1.09 1.13        Diagnostics:  Recent Results (from the past 24 hour(s))   Comprehensive metabolic panel    Collection Time: 04/06/22  1:22 PM   Result Value Ref Range    Sodium 138 133 - 144 mmol/L    Potassium 4.2 3.4 - 5.3 mmol/L    Chloride 109 94 - 109 mmol/L    Carbon Dioxide (CO2)      Anion Gap      Urea Nitrogen      Creatinine      Calcium      Glucose      Alkaline Phosphatase      AST      ALT      Protein Total      Albumin      Bilirubin Total      GFR Estimate     CBC with platelets and differential    Collection Time: 04/06/22  1:22 PM   Result Value Ref Range    WBC Count 6.7 4.0 - 11.0 10e3/uL    RBC Count 4.92 4.40 - 5.90 10e6/uL    Hemoglobin 15.5 13.3 - 17.7 g/dL    Hematocrit 45.6 40.0 - 53.0 %    MCV 93 78 - 100 fL    MCH 31.5 26.5 - 33.0 pg    MCHC 34.0 31.5 - 36.5 g/dL    RDW 13.0 10.0 - 15.0 %    Platelet Count 150 150 - 450 10e3/uL    % Neutrophils 54 %    % Lymphocytes 30 %    % Monocytes 10 %    % Eosinophils 5 %    % Basophils 1 %    % Immature Granulocytes 0 %    NRBCs per 100 WBC 0 <1 /100    Absolute Neutrophils 3.6 1.6 - 8.3 10e3/uL    Absolute Lymphocytes 2.0 0.8 - 5.3 10e3/uL    Absolute Monocytes 0.7 0.0 - 1.3 10e3/uL    Absolute Eosinophils 0.4 0.0 - 0.7 10e3/uL    Absolute Basophils 0.1 0.0 - 0.2 10e3/uL    Absolute Immature Granulocytes 0.0 <=0.4 10e3/uL    Absolute NRBCs 0.0 10e3/uL      No EKG this visit, completed in the last 90  days.    Revised Cardiac Risk Index (RCRI):  The patient has the following serious cardiovascular risks for perioperative complications:   - No serious cardiac risks = 0 points     RCRI Interpretation: 0 points: Class I (very low risk - 0.4% complication rate)           Signed Electronically by: Nydia Dexter MD  Copy of this evaluation report is provided to requesting physician.

## 2022-04-06 ENCOUNTER — OFFICE VISIT (OUTPATIENT)
Dept: FAMILY MEDICINE | Facility: OTHER | Age: 67
End: 2022-04-06
Attending: FAMILY MEDICINE
Payer: COMMERCIAL

## 2022-04-06 VITALS
SYSTOLIC BLOOD PRESSURE: 124 MMHG | WEIGHT: 196.6 LBS | HEIGHT: 69 IN | DIASTOLIC BLOOD PRESSURE: 70 MMHG | TEMPERATURE: 98.2 F | HEART RATE: 65 BPM | OXYGEN SATURATION: 96 % | BODY MASS INDEX: 29.12 KG/M2

## 2022-04-06 DIAGNOSIS — Z01.818 PREOP TESTING: Primary | ICD-10-CM

## 2022-04-06 DIAGNOSIS — G56.03 BILATERAL CARPAL TUNNEL SYNDROME: ICD-10-CM

## 2022-04-06 LAB
ALBUMIN SERPL-MCNC: 3.6 G/DL (ref 3.4–5)
ALP SERPL-CCNC: 122 U/L (ref 40–150)
ALT SERPL W P-5'-P-CCNC: 45 U/L (ref 0–70)
ANION GAP SERPL CALCULATED.3IONS-SCNC: 4 MMOL/L (ref 3–14)
AST SERPL W P-5'-P-CCNC: 34 U/L (ref 0–45)
BASOPHILS # BLD AUTO: 0.1 10E3/UL (ref 0–0.2)
BASOPHILS NFR BLD AUTO: 1 %
BILIRUB SERPL-MCNC: 0.6 MG/DL (ref 0.2–1.3)
BUN SERPL-MCNC: 17 MG/DL (ref 7–30)
CALCIUM SERPL-MCNC: 9 MG/DL (ref 8.5–10.1)
CHLORIDE BLD-SCNC: 109 MMOL/L (ref 94–109)
CO2 SERPL-SCNC: 25 MMOL/L (ref 20–32)
CREAT SERPL-MCNC: 1.13 MG/DL (ref 0.66–1.25)
EOSINOPHIL # BLD AUTO: 0.4 10E3/UL (ref 0–0.7)
EOSINOPHIL NFR BLD AUTO: 5 %
ERYTHROCYTE [DISTWIDTH] IN BLOOD BY AUTOMATED COUNT: 13 % (ref 10–15)
GFR SERPL CREATININE-BSD FRML MDRD: 72 ML/MIN/1.73M2
GLUCOSE BLD-MCNC: 82 MG/DL (ref 70–99)
HCT VFR BLD AUTO: 45.6 % (ref 40–53)
HGB BLD-MCNC: 15.5 G/DL (ref 13.3–17.7)
IMM GRANULOCYTES # BLD: 0 10E3/UL
IMM GRANULOCYTES NFR BLD: 0 %
LYMPHOCYTES # BLD AUTO: 2 10E3/UL (ref 0.8–5.3)
LYMPHOCYTES NFR BLD AUTO: 30 %
MCH RBC QN AUTO: 31.5 PG (ref 26.5–33)
MCHC RBC AUTO-ENTMCNC: 34 G/DL (ref 31.5–36.5)
MCV RBC AUTO: 93 FL (ref 78–100)
MONOCYTES # BLD AUTO: 0.7 10E3/UL (ref 0–1.3)
MONOCYTES NFR BLD AUTO: 10 %
NEUTROPHILS # BLD AUTO: 3.6 10E3/UL (ref 1.6–8.3)
NEUTROPHILS NFR BLD AUTO: 54 %
NRBC # BLD AUTO: 0 10E3/UL
NRBC BLD AUTO-RTO: 0 /100
PLATELET # BLD AUTO: 150 10E3/UL (ref 150–450)
POTASSIUM BLD-SCNC: 4.2 MMOL/L (ref 3.4–5.3)
PROT SERPL-MCNC: 7.2 G/DL (ref 6.8–8.8)
RBC # BLD AUTO: 4.92 10E6/UL (ref 4.4–5.9)
SODIUM SERPL-SCNC: 138 MMOL/L (ref 133–144)
WBC # BLD AUTO: 6.7 10E3/UL (ref 4–11)

## 2022-04-06 PROCEDURE — 85025 COMPLETE CBC W/AUTO DIFF WBC: CPT | Mod: ZL | Performed by: FAMILY MEDICINE

## 2022-04-06 PROCEDURE — 99214 OFFICE O/P EST MOD 30 MIN: CPT | Performed by: FAMILY MEDICINE

## 2022-04-06 PROCEDURE — 36415 COLL VENOUS BLD VENIPUNCTURE: CPT | Mod: ZL | Performed by: FAMILY MEDICINE

## 2022-04-06 PROCEDURE — 80053 COMPREHEN METABOLIC PANEL: CPT | Mod: ZL | Performed by: FAMILY MEDICINE

## 2022-04-06 PROCEDURE — G0463 HOSPITAL OUTPT CLINIC VISIT: HCPCS

## 2022-04-06 ASSESSMENT — PAIN SCALES - GENERAL: PAINLEVEL: NO PAIN (0)

## 2022-04-06 NOTE — NURSING NOTE
"Chief Complaint   Patient presents with     Pre-Op Exam       Initial /70 (BP Location: Right arm, Patient Position: Sitting, Cuff Size: Adult Regular)   Pulse 65   Temp 98.2  F (36.8  C) (Tympanic)   Ht 1.753 m (5' 9\")   Wt 89.2 kg (196 lb 9.6 oz)   SpO2 96%   BMI 29.03 kg/m   Estimated body mass index is 29.03 kg/m  as calculated from the following:    Height as of this encounter: 1.753 m (5' 9\").    Weight as of this encounter: 89.2 kg (196 lb 9.6 oz).  Medication Reconciliation: complete  Melvina Contreras LPN  "

## 2022-04-07 ENCOUNTER — TELEPHONE (OUTPATIENT)
Dept: FAMILY MEDICINE | Facility: OTHER | Age: 67
End: 2022-04-07
Payer: COMMERCIAL

## 2022-11-01 NOTE — PROGRESS NOTES
"  Assessment & Plan     Hyperlipidemia LDL goal <130  The 10-year ASCVD risk score (Sheryl VIVAR, et al., 2019) is: 11.5%    Values used to calculate the score:      Age: 67 years      Sex: Male      Is Non- : No      Diabetic: No      Tobacco smoker: No      Systolic Blood Pressure: 132 mmHg      Is BP treated: No      HDL Cholesterol: 69 mg/dL      Total Cholesterol: 184 mg/dL  Continue crestor  - Comprehensive metabolic panel; Future  - Lipid Profile (Chol, Trig, HDL, LDL calc); Future  - rosuvastatin (CRESTOR) 10 MG tablet; Take 1 tablet (10 mg) by mouth daily  - Lipid Profile (Chol, Trig, HDL, LDL calc)  - Comprehensive metabolic panel    Hypovitaminosis D    - Vitamin D Deficiency; Future  - Vitamin D Deficiency    Dental abscess  Left upper molar, he is draining it himself, has had for 2 years, recommend dental appt    Chronic seasonal allergic rhinitis due to pollen  Works well  - azelastine (ASTELIN) 0.1 % nasal spray; use two sprays in each nostril twice daily         BMI:   Estimated body mass index is 28.65 kg/m  as calculated from the following:    Height as of this encounter: 1.753 m (5' 9\").    Weight as of this encounter: 88 kg (194 lb).   Weight management plan: Discussed healthy diet and exercise guidelines    Patient was agreeable to this plan and had no further questions.  There are no Patient Instructions on file for this visit.    No follow-ups on file.    Nydia Dexter MD  [facility] - [place] Perea is a 67 year old, presenting for the following health issues:  Recheck Medication      HPI     Hyperlipidemia Follow-Up      Are you regularly taking any medication or supplement to lower your cholesterol?   Yes- Crestor    Are you having muscle aches or other side effects that you think could be caused by your cholesterol lowering medication?  No      How many servings of fruits and vegetables do you eat daily?  2-3    On average, how many " "sweetened beverages do you drink each day (Examples: soda, juice, sweet tea, etc.  Do NOT count diet or artificially sweetened beverages)?   2    How many days per week do you exercise enough to make your heart beat faster? 5    How many minutes a day do you exercise enough to make your heart beat faster? 10 - 19    How many days per week do you miss taking your medication? 0    Allergies  Onset/Duration: year round  Symptoms:   Nasal congestion: YES  Sneezing: No  Red, itchy eyes: No  Progression of Symptoms: same  Accompanying Signs & Symptoms:  Cough: YES  Wheezing: No  Rash: No  Sinus/facial pain: No  History:   Is it seasonal: in the fall and during any time of the year   History of Asthma: No  Has allergy testing been done: No  Precipitating factors:   None  Alleviating factors:  astelin  Therapies tried and outcome: above      Review of Systems   Constitutional, HEENT, cardiovascular, pulmonary, gi and gu systems are negative, except as otherwise noted.      Objective    /70   Pulse 66   Temp 97.6  F (36.4  C) (Tympanic)   Ht 1.753 m (5' 9\")   Wt 88 kg (194 lb)   SpO2 96%   BMI 28.65 kg/m    Body mass index is 28.65 kg/m .  Physical Exam   GENERAL: healthy, alert and no distress  EYES: Eyes grossly normal to inspection, PERRL and conjunctivae and sclerae normal  HENT: ear canals and TM's normal, nose and mouth without ulcers or lesions  NECK: no adenopathy, no asymmetry, masses, or scars and thyroid normal to palpation  RESP: lungs clear to auscultation - no rales, rhonchi or wheezes  CV: regular rate and rhythm, normal S1 S2, no S3 or S4, no murmur, click or rub, no peripheral edema and peripheral pulses strong  ABDOMEN: soft, nontender, no hepatosplenomegaly, no masses and bowel sounds normal  MS: no gross musculoskeletal defects noted, no edema  SKIN: no suspicious lesions or rashes  NEURO: Normal strength and tone, mentation intact and speech normal  PSYCH: mentation appears normal, affect " normal/bright    Results for orders placed or performed in visit on 11/02/22   Lipid Profile (Chol, Trig, HDL, LDL calc)     Status: Normal   Result Value Ref Range    Cholesterol 184 <200 mg/dL    Triglycerides 133 <150 mg/dL    Direct Measure HDL 69 >=40 mg/dL    LDL Cholesterol Calculated 88 <=100 mg/dL    Non HDL Cholesterol 115 <130 mg/dL    Narrative    Cholesterol  Desirable:  <200 mg/dL    Triglycerides  Normal:  Less than 150 mg/dL  Borderline High:  150-199 mg/dL  High:  200-499 mg/dL  Very High:  Greater than or equal to 500 mg/dL    Direct Measure HDL  Female:  Greater than or equal to 50 mg/dL   Male:  Greater than or equal to 40 mg/dL    LDL Cholesterol  Desirable:  <100mg/dL  Above Desirable:  100-129 mg/dL   Borderline High:  130-159 mg/dL   High:  160-189 mg/dL   Very High:  >= 190 mg/dL    Non HDL Cholesterol  Desirable:  130 mg/dL  Above Desirable:  130-159 mg/dL  Borderline High:  160-189 mg/dL  High:  190-219 mg/dL  Very High:  Greater than or equal to 220 mg/dL   Comprehensive metabolic panel     Status: Normal   Result Value Ref Range    Sodium 137 136 - 145 mmol/L    Potassium 4.7 3.4 - 5.3 mmol/L    Chloride 103 98 - 107 mmol/L    Carbon Dioxide (CO2) 22 22 - 29 mmol/L    Anion Gap 12 7 - 15 mmol/L    Urea Nitrogen 12.0 8.0 - 23.0 mg/dL    Creatinine 1.02 0.67 - 1.17 mg/dL    Calcium 9.7 8.8 - 10.2 mg/dL    Glucose 94 70 - 99 mg/dL    Alkaline Phosphatase 117 40 - 129 U/L    AST 26 10 - 50 U/L    ALT 37 10 - 50 U/L    Protein Total 7.6 6.4 - 8.3 g/dL    Albumin 4.5 3.5 - 5.2 g/dL    Bilirubin Total 0.6 <=1.2 mg/dL    GFR Estimate 81 >60 mL/min/1.73m2

## 2022-11-02 ENCOUNTER — OFFICE VISIT (OUTPATIENT)
Dept: FAMILY MEDICINE | Facility: OTHER | Age: 67
End: 2022-11-02
Attending: FAMILY MEDICINE
Payer: COMMERCIAL

## 2022-11-02 VITALS
WEIGHT: 194 LBS | OXYGEN SATURATION: 96 % | HEIGHT: 69 IN | SYSTOLIC BLOOD PRESSURE: 132 MMHG | TEMPERATURE: 97.6 F | BODY MASS INDEX: 28.73 KG/M2 | DIASTOLIC BLOOD PRESSURE: 70 MMHG | HEART RATE: 66 BPM

## 2022-11-02 DIAGNOSIS — K04.7 DENTAL ABSCESS: ICD-10-CM

## 2022-11-02 DIAGNOSIS — J30.1 CHRONIC SEASONAL ALLERGIC RHINITIS DUE TO POLLEN: ICD-10-CM

## 2022-11-02 DIAGNOSIS — E55.9 HYPOVITAMINOSIS D: ICD-10-CM

## 2022-11-02 DIAGNOSIS — E78.5 HYPERLIPIDEMIA LDL GOAL <130: Primary | ICD-10-CM

## 2022-11-02 LAB
ALBUMIN SERPL BCG-MCNC: 4.5 G/DL (ref 3.5–5.2)
ALP SERPL-CCNC: 117 U/L (ref 40–129)
ALT SERPL W P-5'-P-CCNC: 37 U/L (ref 10–50)
ANION GAP SERPL CALCULATED.3IONS-SCNC: 12 MMOL/L (ref 7–15)
AST SERPL W P-5'-P-CCNC: 26 U/L (ref 10–50)
BILIRUB SERPL-MCNC: 0.6 MG/DL
BUN SERPL-MCNC: 12 MG/DL (ref 8–23)
CALCIUM SERPL-MCNC: 9.7 MG/DL (ref 8.8–10.2)
CHLORIDE SERPL-SCNC: 103 MMOL/L (ref 98–107)
CHOLEST SERPL-MCNC: 184 MG/DL
CREAT SERPL-MCNC: 1.02 MG/DL (ref 0.67–1.17)
DEPRECATED HCO3 PLAS-SCNC: 22 MMOL/L (ref 22–29)
GFR SERPL CREATININE-BSD FRML MDRD: 81 ML/MIN/1.73M2
GLUCOSE SERPL-MCNC: 94 MG/DL (ref 70–99)
HDLC SERPL-MCNC: 69 MG/DL
LDLC SERPL CALC-MCNC: 88 MG/DL
NONHDLC SERPL-MCNC: 115 MG/DL
POTASSIUM SERPL-SCNC: 4.7 MMOL/L (ref 3.4–5.3)
PROT SERPL-MCNC: 7.6 G/DL (ref 6.4–8.3)
SODIUM SERPL-SCNC: 137 MMOL/L (ref 136–145)
TRIGL SERPL-MCNC: 133 MG/DL

## 2022-11-02 PROCEDURE — 80053 COMPREHEN METABOLIC PANEL: CPT | Mod: ZL | Performed by: FAMILY MEDICINE

## 2022-11-02 PROCEDURE — G0463 HOSPITAL OUTPT CLINIC VISIT: HCPCS

## 2022-11-02 PROCEDURE — 99214 OFFICE O/P EST MOD 30 MIN: CPT | Performed by: FAMILY MEDICINE

## 2022-11-02 PROCEDURE — 82306 VITAMIN D 25 HYDROXY: CPT | Mod: ZL | Performed by: FAMILY MEDICINE

## 2022-11-02 PROCEDURE — 36415 COLL VENOUS BLD VENIPUNCTURE: CPT | Mod: ZL | Performed by: FAMILY MEDICINE

## 2022-11-02 PROCEDURE — 80061 LIPID PANEL: CPT | Mod: ZL | Performed by: FAMILY MEDICINE

## 2022-11-02 RX ORDER — AZELASTINE 1 MG/ML
SPRAY, METERED NASAL
Qty: 30 ML | Refills: 11 | Status: SHIPPED | OUTPATIENT
Start: 2022-11-02 | End: 2023-11-20

## 2022-11-02 RX ORDER — ROSUVASTATIN CALCIUM 10 MG/1
10 TABLET, COATED ORAL DAILY
Qty: 90 TABLET | Refills: 3 | Status: SHIPPED | OUTPATIENT
Start: 2022-11-02 | End: 2023-08-09

## 2022-11-02 SDOH — HEALTH STABILITY: PHYSICAL HEALTH: ON AVERAGE, HOW MANY DAYS PER WEEK DO YOU ENGAGE IN MODERATE TO STRENUOUS EXERCISE (LIKE A BRISK WALK)?: 3 DAYS

## 2022-11-02 SDOH — HEALTH STABILITY: PHYSICAL HEALTH: ON AVERAGE, HOW MANY MINUTES DO YOU ENGAGE IN EXERCISE AT THIS LEVEL?: 20 MIN

## 2022-11-02 SDOH — HEALTH STABILITY: PHYSICAL HEALTH: ON AVERAGE, HOW MANY DAYS PER WEEK DO YOU ENGAGE IN MODERATE TO STRENUOUS EXERCISE (LIKE A BRISK WALK)?: 2 DAYS

## 2022-11-02 SDOH — HEALTH STABILITY: PHYSICAL HEALTH: ON AVERAGE, HOW MANY MINUTES DO YOU ENGAGE IN EXERCISE AT THIS LEVEL?: 30 MIN

## 2022-11-02 ASSESSMENT — PAIN SCALES - GENERAL: PAINLEVEL: NO PAIN (0)

## 2022-11-02 NOTE — NURSING NOTE
"Chief Complaint   Patient presents with     Recheck Medication       Initial /70   Pulse 66   Temp 97.6  F (36.4  C) (Tympanic)   Ht 1.753 m (5' 9\")   Wt 88 kg (194 lb)   SpO2 96%   BMI 28.65 kg/m   Estimated body mass index is 28.65 kg/m  as calculated from the following:    Height as of this encounter: 1.753 m (5' 9\").    Weight as of this encounter: 88 kg (194 lb).  Medication Reconciliation: complete  Meera Rachel LPN  "

## 2022-11-04 LAB — DEPRECATED CALCIDIOL+CALCIFEROL SERPL-MC: 19 UG/L (ref 20–75)

## 2023-02-01 ENCOUNTER — OFFICE VISIT (OUTPATIENT)
Dept: FAMILY MEDICINE | Facility: OTHER | Age: 68
End: 2023-02-01
Attending: FAMILY MEDICINE
Payer: COMMERCIAL

## 2023-02-01 VITALS
WEIGHT: 199.5 LBS | HEART RATE: 78 BPM | OXYGEN SATURATION: 97 % | DIASTOLIC BLOOD PRESSURE: 92 MMHG | RESPIRATION RATE: 18 BRPM | TEMPERATURE: 98.3 F | BODY MASS INDEX: 29.46 KG/M2 | SYSTOLIC BLOOD PRESSURE: 136 MMHG

## 2023-02-01 DIAGNOSIS — J01.90 ACUTE SINUSITIS WITH SYMPTOMS > 10 DAYS: Primary | ICD-10-CM

## 2023-02-01 PROCEDURE — 99213 OFFICE O/P EST LOW 20 MIN: CPT | Performed by: FAMILY MEDICINE

## 2023-02-01 PROCEDURE — G0463 HOSPITAL OUTPT CLINIC VISIT: HCPCS

## 2023-02-01 RX ORDER — CEFDINIR 300 MG/1
300 CAPSULE ORAL 2 TIMES DAILY
Qty: 20 CAPSULE | Refills: 0 | Status: SHIPPED | OUTPATIENT
Start: 2023-02-01 | End: 2023-02-11

## 2023-02-01 ASSESSMENT — PAIN SCALES - GENERAL: PAINLEVEL: NO PAIN (0)

## 2023-02-01 ASSESSMENT — ENCOUNTER SYMPTOMS
WHEEZING: 0
COUGH: 1
SHORTNESS OF BREATH: 0
FEVER: 0

## 2023-02-01 NOTE — PROGRESS NOTES
Assessment & Plan     Acute sinusitis with symptoms > 10 days  Seems to be on the uphill side of his illness.  He's going to try Nasal saline for a couple days and then do Cefdinir if not continuing to improve.  - cefdinir (OMNICEF) 300 MG capsule; Take 1 capsule (300 mg) by mouth 2 times daily for 10 days    PCN Allergy is rash as a kid.    Follow-up as needed.    GERALDO LOJA,   [facility] - [place] Perea is a 68 year old, presenting for the following health issues:  Fatigue and Headache      HPI     Today is day 11 of symptoms.  Started as bilateral ear pressure, then sinus pressure/congestion, then sore throat and coughing from sinus drainage.  Neck lymph nodes were enlarged, but now back to normal and ST feeling better.  Still coughing from drainage.  No fever.    Acute Illness  Acute illness concerns: Headache, sinus congestion, ears popping, sore throat, coughing, swollen glands.  Onset/Duration: 01/21/2023  Symptoms:  Fever: No  Chills/Sweats: No  Headache (location?): YES- Front  Sinus Pressure: No  Conjunctivitis:  No  Ear Pain: YES- popping  Rhinorrhea: No, just drainage  Congestion: sinus  Sore Throat: YES  Cough: YES-productive of yellow sputum, productive of green sputum  Wheeze: No  Decreased Appetite: YES  Nausea: No  Vomiting: No  Diarrhea: No  Dysuria/Freq.: No  Dysuria or Hematuria: No  Fatigue/Achiness: No  Sick/Strep Exposure: No  Therapies tried and outcome: Salt water for sore throat, Nasal spray decongestant, increase fluids      Review of Systems   Constitutional: Negative for fever.   Respiratory: Positive for cough. Negative for shortness of breath and wheezing.    Cardiovascular: Negative for chest pain.            Objective    BP (!) 136/92   Pulse 78   Temp 98.3  F (36.8  C) (Tympanic)   Resp 18   Wt 90.5 kg (199 lb 8 oz)   SpO2 97%   BMI 29.46 kg/m    Body mass index is 29.46 kg/m .  Physical Exam  Constitutional:       General: He is not in acute  distress.     Appearance: Normal appearance.   HENT:      Head: Normocephalic and atraumatic.      Right Ear: Tympanic membrane normal.      Left Ear: Tympanic membrane normal.      Nose:      Comments: Trace grey mucus seen deep inside nares     Mouth/Throat:      Mouth: Mucous membranes are moist.      Pharynx: Oropharynx is clear.   Eyes:      Conjunctiva/sclera: Conjunctivae normal.      Pupils: Pupils are equal, round, and reactive to light.   Cardiovascular:      Rate and Rhythm: Normal rate and regular rhythm.      Heart sounds: No murmur heard.  Pulmonary:      Effort: Pulmonary effort is normal.      Breath sounds: Normal breath sounds. No wheezing, rhonchi or rales.   Musculoskeletal:      Cervical back: No rigidity.      Right lower leg: No edema.      Left lower leg: No edema.   Lymphadenopathy:      Cervical: No cervical adenopathy.   Neurological:      Mental Status: He is alert and oriented to person, place, and time.

## 2023-04-15 ENCOUNTER — APPOINTMENT (OUTPATIENT)
Dept: GENERAL RADIOLOGY | Facility: HOSPITAL | Age: 68
End: 2023-04-15
Attending: PHYSICIAN ASSISTANT
Payer: MEDICARE

## 2023-04-15 ENCOUNTER — HOSPITAL ENCOUNTER (EMERGENCY)
Facility: HOSPITAL | Age: 68
Discharge: HOME | End: 2023-04-15
Attending: PHYSICIAN ASSISTANT | Admitting: PHYSICIAN ASSISTANT
Payer: MEDICARE

## 2023-04-15 VITALS
HEIGHT: 69 IN | TEMPERATURE: 96.7 F | SYSTOLIC BLOOD PRESSURE: 151 MMHG | HEART RATE: 89 BPM | WEIGHT: 187 LBS | OXYGEN SATURATION: 96 % | BODY MASS INDEX: 27.7 KG/M2 | RESPIRATION RATE: 18 BRPM | DIASTOLIC BLOOD PRESSURE: 96 MMHG
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
## 2023-04-15 DIAGNOSIS — S01.81XA LACERATION OF OTHER PART OF HEAD WITHOUT FOREIGN BODY, INITIAL ENCOUNTER: ICD-10-CM

## 2023-04-15 PROCEDURE — 999N000104 HC STATISTIC NO CHARGE

## 2023-04-15 PROCEDURE — 73030 X-RAY EXAM OF SHOULDER: CPT | Mod: RT

## 2023-04-15 PROCEDURE — 12011 RPR F/E/E/N/L/M 2.5 CM/<: CPT

## 2023-04-15 PROCEDURE — 12011 RPR F/E/E/N/L/M 2.5 CM/<: CPT | Performed by: PHYSICIAN ASSISTANT

## 2023-04-15 PROCEDURE — 99213 OFFICE O/P EST LOW 20 MIN: CPT | Mod: 25 | Performed by: PHYSICIAN ASSISTANT

## 2023-04-15 ASSESSMENT — ENCOUNTER SYMPTOMS
WOUND: 1
ARTHRALGIAS: 1

## 2023-04-15 ASSESSMENT — ACTIVITIES OF DAILY LIVING (ADL): ADLS_ACUITY_SCORE: 35

## 2023-04-16 NOTE — ED PROVIDER NOTES
History     Chief Complaint   Patient presents with     Head Laceration     HPI  Atul Coker is a 68 year old male who presents to the urgent care today for evaluation of a head laceration.  Patient states onset of symptoms 2 hours ago.  Patient endorses that he was walking outdoors, tripped on his boot laces and fell to the right side, contacting the pavement with his right shoulder, dorsal aspect of the right hand, and sustained a laceration just above the right eye.  Complains of right shoulder pain.  Patient denies loss of consciousness, headache, visual changes, nausea/vomiting, gait difficulty, dizziness, neck pain, abdominal pain, shortness of breath, chest pain, or other injuries.  Bleeding has been well controlled with pressure.  Patient denies other injuries.  He has not taken any medications.  He does not take any blood thinners.  His sister-in-law drove him here.    Allergies:  Allergies   Allergen Reactions     Seasonal Allergies      Penicillins Rash       Problem List:    Patient Active Problem List    Diagnosis Date Noted     Dental abscess 11/02/2022     Priority: Medium     Skin cancer of anterior chest 02/11/2022     Priority: Medium     Atypical pigmented lesion 01/05/2022     Priority: Medium     Bilateral carpal tunnel syndrome 01/05/2022     Priority: Medium     Weakness of both hands 01/05/2022     Priority: Medium     Paresthesias 01/05/2022     Priority: Medium     Hypovitaminosis D 01/05/2022     Priority: Medium     Muscle cramps 01/05/2022     Priority: Medium     Retinal vein occlusion of right eye 06/13/2019     Priority: Medium     Branch retinal vein occlusion of left eye with macular edema 06/13/2019     Priority: Medium     Cervical disc disorder at C6-C7 level with radiculopathy 06/13/2019     Priority: Medium     Screening for AAA (abdominal aortic aneurysm) 06/12/2019     Priority: Medium     Chronic seasonal allergic rhinitis due to pollen 06/19/2016     Priority:  Medium     Chronic bilateral low back pain without sciatica 06/19/2016     Priority: Medium     GERD (gastroesophageal reflux disease) 03/26/2013     Priority: Medium     CMV (cytomegalovirus) positive (H) 03/26/2013     Priority: Medium     Hyperlipidemia LDL goal <130 03/26/2013     Priority: Medium        Past Medical History:    Past Medical History:   Diagnosis Date     Allergic rhinitis, cause unspecified 03/05/2013     Branch retinal vein occlusion of both eyes      CMV (cytomegalovirus infection) status positive (H)      Concussion without loss of consciousness      Hyperplastic colonic polyp 2019     Infection due to 2019 novel coronavirus 10/13/2022     Other abnormal glucose 03/05/2013     Pure hypercholesterolemia 01/24/2012     Vocal cord nodules        Past Surgical History:    Past Surgical History:   Procedure Laterality Date     CARPAL TUNNEL RELEASE RT/LT Right 2018    Dr Hinton     COLONOSCOPY  08/07/2006    repeat in 10 yrs     COLONOSCOPY N/A 05/23/2019    due in 2024  Procedure: COLONOSCOPY WITH POLYPECTOMY;  Surgeon: aDvid Groves MD;  Location: [facility]     EXCISE LESION BACK N/A 2/14/2022    Procedure: Excision of skin lesion on back;  Surgeon: David Groves MD;  Location: [facility]     EXCISE LESION NECK Left 3/7/2022    Procedure: Re-Excision of Lesion on Neck, left;  Surgeon: David Groves MD;  Location: [facility]     EXCISE LESION TRUNK N/A 2/14/2022    Procedure: Excision of skin lesions chest x2;  Surgeon: David Groves MD;  Location: [facility]     HERNIA REPAIR, INGUINAL RT/LT Right 01/01/2013    Right side     UPPER GI ENDOSCOPY  2001    found vocal cord nodules     VASECTOMY  01/01/2013       Family History:    Family History   Problem Relation Age of Onset     C.A.D. Mother      Other - See Comments Mother         colon polyps     Hypertension Mother      Breast Cancer Mother 80     Dementia Mother 89     C.A.D. Father      Myocardial Infarction Father 56         "myocardial infarction     Hypertension Sister      Hyperlipidemia Sister      Hypertension Sister      Hyperlipidemia Sister      Liver Disease Brother         end stage     Hypertension Brother      Hyperlipidemia Brother      Cerebrovascular Disease Maternal Grandmother      Other - See Comments Maternal Grandfather         accident     Heart Disease Paternal Grandmother      Heart Failure Paternal Grandfather        Social History:  Marital Status:   [2]  Social History     Tobacco Use     Smoking status: Never     Smokeless tobacco: Never   Vaping Use     Vaping status: Never Used   Substance Use Topics     Alcohol use: Yes     Alcohol/week: 7.0 standard drinks of alcohol     Types: 7 Standard drinks or equivalent per week     Comment: occasionally     Drug use: No        Medications:    ASPIRIN PO  azelastine (ASTELIN) 0.1 % nasal spray  BEVACIZUMAB, AVASTIN, INJECTION 2.5MG  Coenzyme Q10 (COQ10) 200 MG CAPS  fexofenadine (ALLEGRA) 180 MG tablet  multivitamin w/minerals (THERA-VIT-M) tablet  rosuvastatin (CRESTOR) 10 MG tablet  vitamin D3 (CHOLECALCIFEROL) 1.25 MG (78284 UT) capsule  Famotidine (PEPCID AC MAXIMUM STRENGTH PO)          Review of Systems   Musculoskeletal: Positive for arthralgias (Right shoulder).   Skin: Positive for wound (Laceration just over the right eye; abrasions on the dorsal aspect of the right hand and wrist).   All other systems reviewed and are negative.      Physical Exam   BP: (!) 204/117  Pulse: 90  Temp: (!) 96.7  F (35.9  C)  Resp: 18  Height: 175.3 cm (5' 9\")  Weight: 84.8 kg (187 lb)  SpO2: 96 %      Physical Exam  Vitals and nursing note reviewed.   Constitutional:       General: He is not in acute distress.     Appearance: Normal appearance.   HENT:      Head: Normocephalic and atraumatic.      Right Ear: Tympanic membrane, ear canal and external ear normal.      Left Ear: Tympanic membrane, ear canal and external ear normal.      Ears:      Comments: No " hemotympanum.     Nose: Nose normal.      Mouth/Throat:      Mouth: Mucous membranes are moist.      Pharynx: Oropharynx is clear.   Eyes:      Extraocular Movements: Extraocular movements intact.      Conjunctiva/sclera: Conjunctivae normal.      Pupils: Pupils are equal, round, and reactive to light.      Comments: No raccoon eyes.   Cardiovascular:      Rate and Rhythm: Normal rate and regular rhythm.      Heart sounds: Normal heart sounds. No murmur heard.  Pulmonary:      Effort: Pulmonary effort is normal. No respiratory distress.      Breath sounds: Normal breath sounds. No wheezing.   Chest:      Chest wall: No tenderness.   Abdominal:      General: Abdomen is flat. Bowel sounds are normal.      Palpations: Abdomen is soft.      Tenderness: There is no abdominal tenderness.   Musculoskeletal:         General: Tenderness (Superior and lateral right shoulder) present. No swelling or deformity. Normal range of motion.      Cervical back: Normal range of motion and neck supple. No tenderness.      Thoracic back: No tenderness.      Lumbar back: No tenderness.   Skin:     General: Skin is warm and dry.      Findings: Abrasion (Dorsal aspect of the right hand and wrist) and laceration (1.5 cm laceration superior to the right eye, lateral brow line) present.   Neurological:      General: No focal deficit present.      Mental Status: He is alert and oriented to person, place, and time.      GCS: GCS eye subscore is 4. GCS verbal subscore is 5. GCS motor subscore is 6.      Cranial Nerves: Cranial nerves 2-12 are intact.      Sensory: Sensation is intact.      Gait: Gait is intact.   Psychiatric:         Attention and Perception: Attention normal.         Mood and Affect: Mood and affect normal.         Speech: Speech normal.         Behavior: Behavior normal. Behavior is cooperative.         ED Course                 [facility]    -Laceration Repair    Date/Time: 4/15/2023 9:12 PM    Performed by: Naldo  Macy GORDON PA-C  Authorized by: Macy Hitchcock PA-C    Risks, benefits and alternatives discussed.      ANESTHESIA (see MAR for exact dosages):     Anesthesia method:  Local infiltration    Local anesthetic:  Lidocaine 1% WITH epi  LACERATION DETAILS     Location:  Face    Face location:  R eyebrow    Length (cm):  1.5    REPAIR TYPE:     Repair type:  Simple      EXPLORATION:     Hemostasis achieved with:  Direct pressure    Wound extent: no foreign body and no vascular damage      Contaminated: no      TREATMENT:     Area cleansed with:  Betadine and saline    Amount of cleaning:  Standard    Irrigation solution:  Sterile saline    Irrigation method:  Syringe    Visualized foreign bodies/material removed: no      SKIN REPAIR     Repair method:  Sutures    Suture size:  5-0    Suture material:  Nylon    Suture technique:  Simple interrupted    Number of sutures:  4    APPROXIMATION     Approximation:  Close    POST-PROCEDURE DETAILS     Dressing:  Non-adherent dressing        PROCEDURE    Patient Tolerance:  Patient tolerated the procedure well with no immediate complications               Critical Care time:               No results found for this or any previous visit (from the past 24 hour(s)).    Medications - No data to display    Assessments & Plan (with Medical Decision Making)   68-year-old male who presented to the urgent care today after experiencing a fall outdoors, sustaining a laceration to the head, just above the right eye in the lateral brow line.  He did not experience loss of consciousness.  He does not take blood thinners.  He has had no symptoms of nausea/vomiting, severe headache, dizziness, visual changes, or neck pain.  Patient complained of right shoulder pain with movement, x-ray was obtained; by my interpretation, this does not show any evidence of acute fracture or dislocation.  Laceration was repaired without difficulty, patient tolerated well without complications.  Abrasions on the right  hand were cleansed and dressed with bandage.  Due to mechanism of injury, contacting head on pavement, and age, I did discuss possibility of head and C-spine CT today.  Patient declined these tests at this time.  He was counseled on warning signs, signs and symptoms of worsening, and return criteria.  Postconcussive symptoms were reviewed.  Suture care was reviewed, including when to return for removal.    I have reviewed the nursing notes.    I have reviewed the findings, diagnosis, plan and need for follow up with the patient.          Medical Decision Making  The patient's presentation was of moderate complexity (an acute complicated injury).    The patient's evaluation involved:  ordering and/or review of 1 test(s) in this encounter (see separate area of note for details)    The patient's management necessitated moderate risk (a decision regarding minor procedure (laceration repair) with risk factors of none).        New Prescriptions    No medications on file       Final diagnoses:   Laceration of other part of head without foreign body, initial encounter   Fall from slip, trip, or stumble, initial encounter       4/15/2023   [facility]     Macy Hitchcock PA-C  04/15/23 [id number]

## 2023-04-16 NOTE — ED TRIAGE NOTES
"Patient presents to emergency room with complaints of fall, head laceration and right shoulder pain. Pt reports he did not tie his boots and the lace got caught on his other boot. He went down landing on his face, right shoulder and hand. \"I was trying not to drop my ipad.\" Denies LOC. Laceration to right eye brow. Denies headache. Full ROM of right shoulder. Bleeding controlled with ice pack. BP elevated in triage. Denies any blood thinners.     "

## 2023-04-16 NOTE — DISCHARGE INSTRUCTIONS
We discussed the possibility of head and neck CT scans today, which you declined.  You should return to the emergency department with any severe, worsening symptoms, or new concerns as discussed, and proceed with these tests.    You may experience symptoms of postconcussive syndrome, as discussed.  Recommend cognitive rest.  May use Tylenol as needed for headache or other discomfort.    Keep your sutures clean and dry for the next 48 hours.  After that time, you may shower but do not soak the sutures.    You should make a follow-up appointment with your primary provider in 1 week for suture removal.    Return sooner with any signs of infection at the suture site.    X-ray of the right shoulder, by my interpretation, did not reveal any acute fracture or dislocation.  We will call you if the radiologist reads this differently.

## 2023-04-16 NOTE — ED TRIAGE NOTES
Patient presents to urgent care for a fall that caused eye laceration & right shoulder pain. Patient tripped over his laces and fell on his face and right shoulder and right hand.

## 2023-04-21 ENCOUNTER — TELEPHONE (OUTPATIENT)
Dept: FAMILY MEDICINE | Facility: OTHER | Age: 68
End: 2023-04-21

## 2023-04-21 NOTE — TELEPHONE ENCOUNTER
Patient in for Nurse Only Suture Removal of Sutures above RT eye. Upon review of ER visit, AVS including to remove sutures on or after 4/22/23.     Discussed with patient, options to have a provider assess to determine if ok to remove the sutures early. Patient ok with rescheduling Nurse Only appointment.     Appointment cancelled and rescheduled to 4/24/23.     Next 5 appointments (look out 90 days)    Apr 24, 2023  9:45 AM  (Arrive by 9:30 AM)  Nurse Only with [facility] NURSE  [facility] ([facility] ) [address]  [address]  [place] [address]  [phone]

## 2023-04-24 ENCOUNTER — ALLIED HEALTH/NURSE VISIT (OUTPATIENT)
Dept: FAMILY MEDICINE | Facility: OTHER | Age: 68
End: 2023-04-24
Attending: FAMILY MEDICINE
Payer: MEDICARE

## 2023-04-24 DIAGNOSIS — Z48.02 ENCOUNTER FOR REMOVAL OF SUTURES: Primary | ICD-10-CM

## 2023-04-24 NOTE — PROGRESS NOTES
Atul Coker presents to the clinic today for removal of sutures.  The patient has had the sutures in place for 9 days.  There has been no history of infection or drainage.  4 sutures are seen located on the Right eye brow ridge.  The wound is healing well with no signs of infection.  Tetanus status is up to date.   All sutures were easily removed today.  Routine wound care discussed.  The patient will follow up as needed.

## 2023-08-08 DIAGNOSIS — E78.5 HYPERLIPIDEMIA LDL GOAL <130: ICD-10-CM

## 2023-08-09 RX ORDER — ROSUVASTATIN CALCIUM 10 MG/1
10 TABLET, COATED ORAL DAILY
Qty: 90 TABLET | Refills: 1 | Status: SHIPPED | OUTPATIENT
Start: 2023-08-09 | End: 2024-02-12

## 2023-08-09 NOTE — TELEPHONE ENCOUNTER
Rosuvastatin (Crestor) 10 MG tablet   Last Written Prescription Date:  11/02/2022  Last Fill Quantity: 90,   # refills: 3  Last Office Visit: 11/02/2022

## 2023-09-19 ENCOUNTER — MYC MEDICAL ADVICE (OUTPATIENT)
Dept: FAMILY MEDICINE | Facility: OTHER | Age: 68
End: 2023-09-19

## 2023-09-20 NOTE — TELEPHONE ENCOUNTER
Called pt regarding Mychart message. Monday his BP was 161/88.Unknown HR. He only has checked his BP 3x in the last two mpnths but it has been around this number.    He denies chest pain,blurred vision.Monday he had a slight HA.He states he eyad has new SOB with going upstairs that is new but [place] any.    He is scheduled on 11.8.2023 for annual. Do you want to see him sooner? Or Nurse only BP's and for how long/frequency?    Please advise.    Advised if s/s occur go to to ED.    He verbalized understanding.    Loyda MONTOYA RN

## 2023-09-20 NOTE — TELEPHONE ENCOUNTER
Recommend start with nurse only BP check and then if high, needs short/15 min appt with me before november

## 2023-09-22 ENCOUNTER — ALLIED HEALTH/NURSE VISIT (OUTPATIENT)
Dept: FAMILY MEDICINE | Facility: OTHER | Age: 68
End: 2023-09-22
Attending: FAMILY MEDICINE
Payer: MEDICARE

## 2023-09-22 VITALS
DIASTOLIC BLOOD PRESSURE: 78 MMHG | HEART RATE: 64 BPM | SYSTOLIC BLOOD PRESSURE: 138 MMHG | TEMPERATURE: 97.7 F | OXYGEN SATURATION: 96 %

## 2023-09-22 DIAGNOSIS — Z01.30 BLOOD PRESSURE CHECK: Primary | ICD-10-CM

## 2023-09-22 PROCEDURE — 99207 PR NO CHARGE NURSE ONLY: CPT

## 2023-09-22 ASSESSMENT — PAIN SCALES - GENERAL: PAINLEVEL: NO PAIN (0)

## 2023-09-22 NOTE — PROGRESS NOTES
"Patient presents for Nurse Only BP check  Writer used a large adult cuff on LUE  /72    O2 96% RA  T 97.7 F, tympanic  Denies pain    Patient states at regularyly scheduled eye exam last week, BP running mid 160's systolic  Took BP with his mom's home cuff a few days later, BP running mid 140\"s systolic  Denies:  Dizziness  Vision issues  Fatigue  States overall, generally healthy    Recommended repeat Nurse only BP one week out to continue BP monitoring  Pt in agreement.  No further concerns at calls end.  Phone call ended pleasantly.   "

## 2023-09-28 ENCOUNTER — ALLIED HEALTH/NURSE VISIT (OUTPATIENT)
Dept: FAMILY MEDICINE | Facility: OTHER | Age: 68
End: 2023-09-28
Attending: FAMILY MEDICINE
Payer: COMMERCIAL

## 2023-09-28 VITALS — HEART RATE: 80 BPM | DIASTOLIC BLOOD PRESSURE: 78 MMHG | SYSTOLIC BLOOD PRESSURE: 136 MMHG | OXYGEN SATURATION: 97 %

## 2023-09-28 DIAGNOSIS — Z01.30 BLOOD PRESSURE CHECK: Primary | ICD-10-CM

## 2023-09-28 PROCEDURE — 99207 PR NO CHARGE NURSE ONLY: CPT

## 2023-09-28 NOTE — PROGRESS NOTES
Patient presents for a blood pressure check. Took the first BP at 9:07 AM and it was 140/76. I had the patient wait 15 minutes. At 9:24 his BP was 136/78. Patient states he has an appointment on 11/08/2023 with PCP.

## 2023-11-08 ENCOUNTER — OFFICE VISIT (OUTPATIENT)
Dept: FAMILY MEDICINE | Facility: OTHER | Age: 68
End: 2023-11-08
Attending: FAMILY MEDICINE
Payer: MEDICARE

## 2023-11-08 ENCOUNTER — LAB (OUTPATIENT)
Dept: LAB | Facility: OTHER | Age: 68
End: 2023-11-08
Payer: COMMERCIAL

## 2023-11-08 VITALS
WEIGHT: 195.44 LBS | OXYGEN SATURATION: 97 % | DIASTOLIC BLOOD PRESSURE: 80 MMHG | TEMPERATURE: 97.5 F | SYSTOLIC BLOOD PRESSURE: 124 MMHG | HEART RATE: 83 BPM | BODY MASS INDEX: 28.86 KG/M2

## 2023-11-08 DIAGNOSIS — Z12.5 SCREENING FOR PROSTATE CANCER: ICD-10-CM

## 2023-11-08 DIAGNOSIS — E78.5 HYPERLIPIDEMIA LDL GOAL <130: ICD-10-CM

## 2023-11-08 DIAGNOSIS — H34.8112 RETINAL VEIN OCCLUSION OF RIGHT EYE, UNSPECIFIED RETINAL VEIN (H): ICD-10-CM

## 2023-11-08 DIAGNOSIS — Z00.00 MEDICARE ANNUAL WELLNESS VISIT, SUBSEQUENT: Primary | ICD-10-CM

## 2023-11-08 DIAGNOSIS — E55.9 HYPOVITAMINOSIS D: ICD-10-CM

## 2023-11-08 DIAGNOSIS — G89.29 CHRONIC PAIN OF BOTH SHOULDERS: ICD-10-CM

## 2023-11-08 DIAGNOSIS — M25.512 CHRONIC PAIN OF BOTH SHOULDERS: ICD-10-CM

## 2023-11-08 DIAGNOSIS — R73.09 ELEVATED GLUCOSE: ICD-10-CM

## 2023-11-08 DIAGNOSIS — M25.511 CHRONIC PAIN OF BOTH SHOULDERS: ICD-10-CM

## 2023-11-08 LAB
ALBUMIN SERPL BCG-MCNC: 4.4 G/DL (ref 3.5–5.2)
ALP SERPL-CCNC: 118 U/L (ref 40–129)
ALT SERPL W P-5'-P-CCNC: 35 U/L (ref 0–70)
ANION GAP SERPL CALCULATED.3IONS-SCNC: 13 MMOL/L (ref 7–15)
AST SERPL W P-5'-P-CCNC: 27 U/L (ref 0–45)
BILIRUB SERPL-MCNC: 0.7 MG/DL
BUN SERPL-MCNC: 13.1 MG/DL (ref 8–23)
CALCIUM SERPL-MCNC: 9.7 MG/DL (ref 8.8–10.2)
CHLORIDE SERPL-SCNC: 103 MMOL/L (ref 98–107)
CHOLEST SERPL-MCNC: 210 MG/DL
CREAT SERPL-MCNC: 1.13 MG/DL (ref 0.67–1.17)
DEPRECATED HCO3 PLAS-SCNC: 21 MMOL/L (ref 22–29)
EGFRCR SERPLBLD CKD-EPI 2021: 71 ML/MIN/1.73M2
GLUCOSE SERPL-MCNC: 112 MG/DL (ref 70–99)
HDLC SERPL-MCNC: 76 MG/DL
LDLC SERPL CALC-MCNC: 100 MG/DL
NONHDLC SERPL-MCNC: 134 MG/DL
POTASSIUM SERPL-SCNC: 4.1 MMOL/L (ref 3.4–5.3)
PROT SERPL-MCNC: 7.4 G/DL (ref 6.4–8.3)
PSA SERPL DL<=0.01 NG/ML-MCNC: 1.08 NG/ML (ref 0–4.5)
SODIUM SERPL-SCNC: 137 MMOL/L (ref 135–145)
TRIGL SERPL-MCNC: 168 MG/DL
VIT D+METAB SERPL-MCNC: 47 NG/ML (ref 20–50)

## 2023-11-08 PROCEDURE — 80061 LIPID PANEL: CPT | Mod: ZL

## 2023-11-08 PROCEDURE — G0439 PPPS, SUBSEQ VISIT: HCPCS | Performed by: FAMILY MEDICINE

## 2023-11-08 PROCEDURE — 80053 COMPREHEN METABOLIC PANEL: CPT | Mod: ZL

## 2023-11-08 PROCEDURE — 82306 VITAMIN D 25 HYDROXY: CPT | Mod: ZL | Performed by: FAMILY MEDICINE

## 2023-11-08 PROCEDURE — G0103 PSA SCREENING: HCPCS | Mod: ZL

## 2023-11-08 PROCEDURE — 99214 OFFICE O/P EST MOD 30 MIN: CPT | Mod: 25 | Performed by: FAMILY MEDICINE

## 2023-11-08 PROCEDURE — G0463 HOSPITAL OUTPT CLINIC VISIT: HCPCS

## 2023-11-08 PROCEDURE — 36415 COLL VENOUS BLD VENIPUNCTURE: CPT | Mod: ZL

## 2023-11-08 SDOH — HEALTH STABILITY: PHYSICAL HEALTH: ON AVERAGE, HOW MANY DAYS PER WEEK DO YOU ENGAGE IN MODERATE TO STRENUOUS EXERCISE (LIKE A BRISK WALK)?: 3 DAYS

## 2023-11-08 SDOH — HEALTH STABILITY: PHYSICAL HEALTH: ON AVERAGE, HOW MANY MINUTES DO YOU ENGAGE IN EXERCISE AT THIS LEVEL?: 30 MIN

## 2023-11-08 ASSESSMENT — ENCOUNTER SYMPTOMS
PALPITATIONS: 0
SORE THROAT: 0
CONSTIPATION: 0
COUGH: 0
ABDOMINAL PAIN: 0
CHILLS: 0
PARESTHESIAS: 0
NERVOUS/ANXIOUS: 0
HEADACHES: 0
HEMATOCHEZIA: 0
FREQUENCY: 0
WEAKNESS: 0
DIARRHEA: 0
NAUSEA: 0
ARTHRALGIAS: 1
MYALGIAS: 0
HEMATURIA: 0
DYSURIA: 0
FEVER: 0
SHORTNESS OF BREATH: 0
HEARTBURN: 0
EYE PAIN: 0
DIZZINESS: 0
JOINT SWELLING: 1

## 2023-11-08 ASSESSMENT — LIFESTYLE VARIABLES
HOW OFTEN DO YOU HAVE A DRINK CONTAINING ALCOHOL: 4 OR MORE TIMES A WEEK
AUDIT-C TOTAL SCORE: 5
HOW MANY STANDARD DRINKS CONTAINING ALCOHOL DO YOU HAVE ON A TYPICAL DAY: 1 OR 2
SKIP TO QUESTIONS 9-10: 0
HOW OFTEN DO YOU HAVE SIX OR MORE DRINKS ON ONE OCCASION: LESS THAN MONTHLY

## 2023-11-08 ASSESSMENT — ACTIVITIES OF DAILY LIVING (ADL): CURRENT_FUNCTION: NO ASSISTANCE NEEDED

## 2023-11-08 ASSESSMENT — PAIN SCALES - GENERAL: PAINLEVEL: NO PAIN (1)

## 2023-11-08 NOTE — PROGRESS NOTES
"SUBJECTIVE:   Eliazar is a 68 year old who presents for Preventive Visit.      11/8/2023     8:34 AM   Additional Questions   Roomed by Lolis Triplett   Accompanied by None         11/8/2023     8:34 AM   Patient Reported Additional Medications   Patient reports taking the following new medications None       Are you in the first 12 months of your Medicare coverage?  No    Healthy Habits:     In general, how would you rate your overall health?  Good    Frequency of exercise:  2-3 days/week    Duration of exercise:  Less than 15 minutes    Do you usually eat at least 4 servings of fruit and vegetables a day, include whole grains    & fiber and avoid regularly eating high fat or \"junk\" foods?  No    Taking medications regularly:  Yes    Medication side effects:  None    Ability to successfully perform activities of daily living:  No assistance needed    Home Safety:  No safety concerns identified    Hearing Impairment:  Difficulty following a conversation in a noisy restaurant or crowded room, feel that people are mumbling or not speaking clearly, difficulty following dialogue in the theater, need to ask people to speak up or repeat themselves and difficulty understanding soft or whispered speech    In the past 6 months, have you been bothered by leaking of urine?  No    In general, how would you rate your overall mental or emotional health?  Good    Additional concerns today:  No    Hyperlipidemia Follow-Up    Are you regularly taking any medication or supplement to lower your cholesterol?   Yes- Crestor   Are you having muscle aches or other side effects that you think could be caused by your cholesterol lowering medication?  Yes- some aches but unsure if it is caused by the medication     How many servings of fruits and vegetables do you eat daily?  0-1  On average, how many sweetened beverages do you drink each day (Examples: soda, juice, sweet tea, etc.  Do NOT count diet or artificially sweetened beverages)?   " 0  How many days per week do you exercise enough to make your heart beat faster? 3 or less  How many minutes a day do you exercise enough to make your heart beat faster? 20 - 29  How many days per week do you miss taking your medication? 0       Have you ever done Advance Care Planning? (For example, a Health Directive, POLST, or a discussion with a medical provider or your loved ones about your wishes): No, advance care planning information given to patient to review.  Patient declined advance care planning discussion at this time.      Fall risk  Fallen 2 or more times in the past year?: No  Any fall with injury in the past year?: Yes    Cognitive Screening   1) Repeat 3 items (Leader, Season, Table)    2) Clock draw: NORMAL  3) 3 item recall: Recalls 3 objects  Results: 3 items recalled: COGNITIVE IMPAIRMENT LESS LIKELY    Mini-CogTM Copyright S Fariba. Licensed by the author for use in [facility]; reprinted with permission (vern@[facility]). All rights reserved.      Do you have sleep apnea, excessive snoring or daytime drowsiness? : yes    Reviewed and updated as needed this visit by clinical staff   Tobacco  Allergies  Meds  Problems  Med Hx  Surg Hx  Fam Hx  Soc   Hx        Reviewed and updated as needed this visit by Provider   Tobacco    Problems  Med Hx  Surg Hx  Fam Hx  Soc Hx       Social History     Tobacco Use    Smoking status: Never    Smokeless tobacco: Never   Substance Use Topics    Alcohol use: Yes     Alcohol/week: 7.0 standard drinks of alcohol     Types: 7 Standard drinks or equivalent per week     Comment: occasionally             11/8/2023     8:46 AM   Alcohol Use   Prescreen: >3 drinks/day or >7 drinks/week? Yes   AUDIT SCORE  7     Do you have a current opioid prescription? No  Do you use any other controlled substances or medications that are not prescribed by a provider? Alcohol    Elevated glucose    Duration: few years  Description (location/character/radiation):  hasn't been sleeping well with shoulders bothering him  Intensity:  mild, moderate  Accompanying signs and symptoms: up at night from shoulder pain  History (similar episodes/previous evaluation): None  Precipitating or alleviating factors: None  Therapies tried and outcome: None       Current providers sharing in care for this patient include:   Patient Care Team:  Nydia Dexter MD as PCP - General (Family Medicine)  Nydia Dexter MD as Assigned PCP    The following health maintenance items are reviewed in Epic and correct as of today:  Health Maintenance   Topic Date Due    RSV VACCINE (Pregnancy & 60+) (1 - 1-dose 60+ series) Never done    INFLUENZA VACCINE (1) 09/01/2023    COVID-19 Vaccine (7 - 2023-24 season) 12/04/2023    COLORECTAL CANCER SCREENING  05/23/2024    MEDICARE ANNUAL WELLNESS VISIT  11/08/2024    LIPID  11/08/2024    FALL RISK ASSESSMENT  11/08/2024    ADVANCE CARE PLANNING  11/08/2028    DTAP/TDAP/TD IMMUNIZATION (3 - Td or Tdap) 01/05/2032    PHQ-2 (once per calendar year)  Completed    Pneumococcal Vaccine: 65+ Years  Completed    ZOSTER IMMUNIZATION  Completed    AORTIC ANEURYSM SCREENING (SYSTEM ASSIGNED)  Completed    HEPATITIS C SCREENING  Addressed    IPV IMMUNIZATION  Aged Out    HPV IMMUNIZATION  Aged Out    MENINGITIS IMMUNIZATION  Aged Out    RSV MONOCLONAL ANTIBODY  Aged Out     Lab work is in process  Labs reviewed in EPIC  BP Readings from Last 3 Encounters:   11/08/23 124/80   09/28/23 136/78   09/22/23 138/78    Wt Readings from Last 3 Encounters:   11/08/23 88.6 kg (195 lb 7 oz)   04/15/23 84.8 kg (187 lb)   02/01/23 90.5 kg (199 lb 8 oz)                  Patient Active Problem List   Diagnosis    GERD (gastroesophageal reflux disease)    CMV (cytomegalovirus) positive (H)    Hyperlipidemia LDL goal <130    Chronic seasonal allergic rhinitis due to pollen    Chronic bilateral low back pain without sciatica    Screening for AAA (abdominal aortic aneurysm)    Retinal  vein occlusion of right eye (H28)    Branch retinal vein occlusion of left eye with macular edema (H28)    Cervical disc disorder at C6-C7 level with radiculopathy    Atypical pigmented lesion    Bilateral carpal tunnel syndrome    Weakness of both hands    Paresthesias    Hypovitaminosis D    Muscle cramps    Skin cancer of anterior chest    Dental abscess     Past Surgical History:   Procedure Laterality Date    CARPAL TUNNEL RELEASE RT/LT Right 2018    Dr Hinton    CARPAL TUNNEL RELEASE RT/LT Bilateral 01/2022    Keegan -- done at [facility]    COLONOSCOPY  08/07/2006    repeat in 10 yrs    COLONOSCOPY N/A 05/23/2019    due in 2024  Procedure: COLONOSCOPY WITH POLYPECTOMY;  Surgeon: David Groves MD;  Location: [facility]    EXCISE LESION BACK N/A 02/14/2022    Procedure: Excision of skin lesion on back;  Surgeon: David Groves MD;  Location: [facility]    EXCISE LESION NECK Left 03/07/2022    Procedure: Re-Excision of Lesion on Neck, left;  Surgeon: David Groves MD;  Location: [facility]    EXCISE LESION TRUNK N/A 02/14/2022    Procedure: Excision of skin lesions chest x2;  Surgeon: David Groves MD;  Location: [facility]    HERNIA REPAIR, INGUINAL RT/LT Right 01/01/2013    Right side    UPPER GI ENDOSCOPY  2001    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 Mother     Hypertension Mother     Breast Cancer Mother 80    Dementia Mother 90    Colon Polyps Mother     C.A.D. Father     Myocardial Infarction Father 56        myocardial infarction    Hypertension Sister     Hyperlipidemia Sister     Hypertension Sister     Hyperlipidemia Sister     Liver Disease  Brother         end stage -- told that it wasn't from ETOH    Hypertension Brother     Hyperlipidemia Brother     Cerebrovascular Disease Maternal Grandmother     Other - See Comments Maternal Grandfather         accident    Heart Disease Paternal Grandmother     Heart Failure Paternal Grandfather          Current Outpatient Medications   Medication Sig Dispense Refill    ASPIRIN PO Take 81 mg by mouth daily      azelastine (ASTELIN) 0.1 % nasal spray use two sprays in each nostril twice daily 30 mL 11    BEVACIZUMAB, AVASTIN, INJECTION 2.5MG 1.25 mg by Intravitreal route 1.25mg right eye monthly, left eye every other month      Coenzyme Q10 (COQ10) 200 MG CAPS Take 1 capsule by mouth daily      fexofenadine (ALLEGRA) 180 MG tablet Take 1 tablet by mouth daily.      multivitamin w/minerals (THERA-VIT-M) tablet Take 1 tablet by mouth daily      rosuvastatin (CRESTOR) 10 MG tablet Take 1 tablet (10 mg) by mouth daily 90 tablet 1    vitamin D3 (CHOLECALCIFEROL) 1.25 MG (38497 UT) capsule Take 1 capsule (50,000 Units) by mouth every 7 days 4 capsule 2    Famotidine (PEPCID AC MAXIMUM STRENGTH PO) Take 20 mg by mouth daily  (Patient not taking: Reported on 11/8/2023)       Allergies   Allergen Reactions    Seasonal Allergies     Penicillins Rash     Review of Systems   Constitutional:  Negative for chills and fever.   HENT:  Negative for congestion, ear pain, hearing loss and sore throat.    Eyes:  Negative for pain and visual disturbance.   Respiratory:  Negative for cough and shortness of breath.    Cardiovascular:  Negative for chest pain, palpitations and peripheral edema.   Gastrointestinal:  Negative for abdominal pain, constipation, diarrhea, heartburn, hematochezia and nausea.   Genitourinary:  Negative for dysuria, frequency, genital sores, hematuria, impotence, penile discharge and urgency.   Musculoskeletal:  Positive for arthralgias and joint swelling. Negative for myalgias.   Skin:  Negative for rash.  "  Neurological:  Negative for dizziness, weakness, headaches and paresthesias.   Psychiatric/Behavioral:  Negative for mood changes. The patient is not nervous/anxious.        OBJECTIVE:   /80 (BP Location: Right arm, Patient Position: Sitting, Cuff Size: Adult Regular)   Pulse 83   Temp 97.5  F (36.4  C) (Tympanic)   Wt 88.6 kg (195 lb 7 oz)   SpO2 97%   BMI 28.86 kg/m   Estimated body mass index is 28.86 kg/m  as calculated from the following:    Height as of 4/15/23: 1.753 m (5' 9\").    Weight as of this encounter: 88.6 kg (195 lb 7 oz).  Physical Exam  GENERAL: healthy, alert and no distress  EYES: Eyes grossly normal to inspection, PERRL and conjunctivae and sclerae normal  HENT: ear canals and TM's normal, nose and mouth without ulcers or lesions  NECK: no adenopathy, no asymmetry, masses, or scars and thyroid normal to palpation  RESP: lungs clear to auscultation - no rales, rhonchi or wheezes  CV: regular rate and rhythm, normal S1 S2, no S3 or S4, no murmur, click or rub, no peripheral edema and peripheral pulses strong  ABDOMEN: soft, nontender, no hepatosplenomegaly, no masses and bowel sounds normal  MS: no gross musculoskeletal defects noted, no edema  SKIN: no suspicious lesions or rashes  NEURO: Normal strength and tone, mentation intact and speech normal  PSYCH: mentation appears normal, affect normal/bright    Diagnostic Test Results:  Labs reviewed in Epic  Results for orders placed or performed in visit on 11/08/23   Comprehensive metabolic panel     Status: Abnormal   Result Value Ref Range    Sodium 137 135 - 145 mmol/L    Potassium 4.1 3.4 - 5.3 mmol/L    Carbon Dioxide (CO2) 21 (L) 22 - 29 mmol/L    Anion Gap 13 7 - 15 mmol/L    Urea Nitrogen 13.1 8.0 - 23.0 mg/dL    Creatinine 1.13 0.67 - 1.17 mg/dL    GFR Estimate 71 >60 mL/min/1.73m2    Calcium 9.7 8.8 - 10.2 mg/dL    Chloride 103 98 - 107 mmol/L    Glucose 112 (H) 70 - 99 mg/dL    Alkaline Phosphatase 118 40 - 129 U/L    AST 27 " 0 - 45 U/L    ALT 35 0 - 70 U/L    Protein Total 7.4 6.4 - 8.3 g/dL    Albumin 4.4 3.5 - 5.2 g/dL    Bilirubin Total 0.7 <=1.2 mg/dL   Lipid Profile (Chol, Trig, HDL, LDL calc)     Status: Abnormal   Result Value Ref Range    Cholesterol 210 (H) <200 mg/dL    Triglycerides 168 (H) <150 mg/dL    Direct Measure HDL 76 >=40 mg/dL    LDL Cholesterol Calculated 100 <=100 mg/dL    Non HDL Cholesterol 134 (H) <130 mg/dL    Narrative    Cholesterol  Desirable:  <200 mg/dL    Triglycerides  Normal:  Less than 150 mg/dL  Borderline High:  150-199 mg/dL  High:  200-499 mg/dL  Very High:  Greater than or equal to 500 mg/dL    Direct Measure HDL  Female:  Greater than or equal to 50 mg/dL   Male:  Greater than or equal to 40 mg/dL    LDL Cholesterol  Desirable:  <100mg/dL  Above Desirable:  100-129 mg/dL   Borderline High:  130-159 mg/dL   High:  160-189 mg/dL   Very High:  >= 190 mg/dL    Non HDL Cholesterol  Desirable:  130 mg/dL  Above Desirable:  130-159 mg/dL  Borderline High:  160-189 mg/dL  High:  190-219 mg/dL  Very High:  Greater than or equal to 220 mg/dL   PSA, screen     Status: Normal   Result Value Ref Range    Prostate Specific Antigen Screen 1.08 0.00 - 4.50 ng/mL    Narrative    This result is obtained using the Roche Elecsys total PSA method on the moises [id number] immunoassay analyzer. Results obtained with different assay methods or kits cannot be used interchangeably.       ASSESSMENT / PLAN:   (Z00.00) Medicare annual wellness visit, subsequent  (primary encounter diagnosis)  Comment:   Plan: follow-up 1 year    (E55.9) Hypovitaminosis D  Comment: was low last year  Plan: Vitamin D Deficiency        Labs pending    (E78.5) Hyperlipidemia LDL goal <130  Comment:   Plan: Comprehensive metabolic panel, Lipid Profile         (Chol, Trig, HDL, LDL calc), Lipid Profile         (Chol, Trig, HDL, LDL calc)        The 10-year ASCVD risk score (Sheryl VIVAR, et al., 2019) is: 11.6%    Values used to calculate the score:       "Age: 68 years      Sex: Male      Is Non- : No      Diabetic: No      Tobacco smoker: No      Systolic Blood Pressure: 124 mmHg      Is BP treated: No      HDL Cholesterol: 76 mg/dL      Total Cholesterol: 210 mg/dL  He is going to to work on some lifestyle changes and follow-up labs fasting in 3 mos    (H34.8112) Retinal vein occlusion of right eye, unspecified retinal vein (H28)  Comment:   Plan: sees ophtho approx monthly    (Z12.5) Screening for prostate cancer  Comment:   Plan: PSA, screen        stable    (M25.511,  G89.29,  M25.512) Chronic pain of both shoulders  Comment:   Plan: diclofenac (VOLTAREN) 1 % topical gel, Physical        Therapy Referral        Would like some help with home program  He has a home gym and is good about working on things himself    (R73.09) Elevated glucose  Comment:   Plan: Glucose, Hemoglobin A1c        Watch sugars    COUNSELING:  Reviewed preventive health counseling, as reflected in patient instructions  Special attention given to:       Regular exercise       Healthy diet/nutrition       Vision screening       Hearing screening       Dental care       Prostate cancer screening      BMI:   Estimated body mass index is 28.86 kg/m  as calculated from the following:    Height as of 4/15/23: 1.753 m (5' 9\").    Weight as of this encounter: 88.6 kg (195 lb 7 oz).         He reports that he has never smoked. He has never used smokeless tobacco.      Appropriate preventive services were discussed with this patient, including applicable screening as appropriate for fall prevention, nutrition, physical activity, Tobacco-use cessation, weight loss and cognition.  Checklist reviewing preventive services available has been given to the patient.    Reviewed patients plan of care and provided an AVS. The Intermediate Care Plan ( asthma action plan, low back pain action plan, and migraine action plan) for Atul meets the Care Plan requirement. This Care Plan has " been established and reviewed with the Patient.          Nydia Dexter MD  [facility] [place]    Identified Health Risks:  I have reviewed Opioid Use Disorder and Substance Use Disorder risk factors and made any needed referrals.

## 2023-11-08 NOTE — PATIENT INSTRUCTIONS
The glucose revolution by Hodan Hernandez  (glucose goddess)    The obesity code by Dr Omer Agee  (you tube)

## 2023-11-17 DIAGNOSIS — J30.1 CHRONIC SEASONAL ALLERGIC RHINITIS DUE TO POLLEN: ICD-10-CM

## 2023-11-17 NOTE — TELEPHONE ENCOUNTER
Astelin      Last Written Prescription Date:  11/2/22  Last Fill Quantity: 30mL,   # refills: 11  Last Office Visit: 11/8/23  Future Office visit:       Routing refill request to provider for review/approval because:

## 2023-11-20 RX ORDER — AZELASTINE 1 MG/ML
SPRAY, METERED NASAL
Qty: 30 ML | Refills: 11 | Status: SHIPPED | OUTPATIENT
Start: 2023-11-20

## 2023-12-06 ENCOUNTER — THERAPY VISIT (OUTPATIENT)
Dept: PHYSICAL THERAPY | Facility: HOSPITAL | Age: 68
End: 2023-12-06
Attending: FAMILY MEDICINE
Payer: MEDICARE

## 2023-12-06 DIAGNOSIS — M25.512 CHRONIC PAIN OF BOTH SHOULDERS: ICD-10-CM

## 2023-12-06 DIAGNOSIS — G89.29 CHRONIC PAIN OF BOTH SHOULDERS: ICD-10-CM

## 2023-12-06 DIAGNOSIS — M25.511 CHRONIC PAIN OF BOTH SHOULDERS: ICD-10-CM

## 2023-12-06 PROCEDURE — 97161 PT EVAL LOW COMPLEX 20 MIN: CPT | Mod: GP

## 2023-12-06 PROCEDURE — 97530 THERAPEUTIC ACTIVITIES: CPT | Mod: GP

## 2023-12-06 PROCEDURE — 97110 THERAPEUTIC EXERCISES: CPT | Mod: GP

## 2023-12-06 NOTE — PROGRESS NOTES
PHYSICAL THERAPY EVALUATION  Type of Visit: Evaluation    See electronic medical record for Abuse and Falls Screening details.    Subjective       Presenting condition or subjective complaint: shoulder pain  Date of onset: 11/08/23    Relevant medical history:   Pt had a fall on 4/15/23. Pt states that shoulder has gotten 75% better since PCP visit.   Dates & types of surgery: hernia 2019? basil cell carcinoma 2021,    Prior diagnostic imaging/testing results: Other Dr checked it out   Prior therapy history for the same diagnosis, illness or injury: No      Prior Level of Function  Transfers: Independent  Ambulation: Independent  ADL: Independent  IADL: Driving, Housekeeping, Laundry, Meal preparation, Yard work    Living Environment  Social support: With a significant other or spouse   Type of home: House   Ramp: No   Stairs inside the home: Yes 12 Is there a railing: Yes   Help at home: None  Equipment owned:   None    Employment: No    Hobbies/Interests:  Skiing, hiking, playing instruments (trumpet).     Patient goals for therapy: sleep at night    Pain assessment: Pain present     Objective   SHOULDER EVALUATION  PAIN: Pain is Worst: nighttime Wakes up from sleep after 2-3 hours  INTEGUMENTARY (edema, incisions): WNL  POSTURE: Sitting Posture: Rounded shoulders, Forward head, Thoracic kyphosis increased  ROM: AROM WFL  PROM WNL Pain noted at 80 degrees of abduction and 100 degrees of flexion on R arm  STRENGTH: WNL except low and mid trap 4/5 BL, R serratus 4+/5  FLEXIBILITY: WNL  SPECIAL TESTS:    Left Right   Impingement     Neer's Positive Negative    Hawkin's-Saad Positive Positive   Coracoid Impingement     Internal impingement     Labral     Anterior Slide Negative  Negative    Tattnall's Negative  Negative    Crank Negative  Negative    Instability     Apprehension (anterior) Negative  Negative    Relocation (anterior) Negative  Negative    Anterior Load & Shift     Posterior Load & Shift     Posterior  instability (with 90 degrees flex)     Multi-Directional Instability      Sulcus Negative  Negative    Biceps      Speed's Negative  Negative    Rotator Cuff Tear     Drop Arm Negative  Negative    Belly Press Negative  Negative    Lift off  Negative  Negative      PALPATION: TTP over R subdeltoid bursa, mildly TTP over AC, NTTP over bicipital groove, mildly TTP over R supraspinatus mm belly, hypertonic BL UT  JOINT MOBILITY: Mild restriction with posterior and inferior glides noted  CERVICAL SCREEN: WFL for shoulder testing and treatment, MOD VAT negative, NEG neural tension, NEG Lacey's    Assessment & Plan   CLINICAL IMPRESSIONS  Medical Diagnosis: Right shoulder pain    Treatment Diagnosis: Right shoulder pain   Impression/Assessment: Patient is a 68 year old male with right shoulder pain and dysfunction complaints with symptoms suggestive of mild subacromial impingement bilaterally with subdeltoid bursa irritation on right.  The following significant findings have been identified: Pain, Decreased joint mobility, Impaired muscle performance, Decreased activity tolerance, and Impaired posture. These impairments interfere with their ability to perform recreational activities and household chores as compared to previous level of function.     Clinical Decision Making (Complexity):  Clinical Presentation: Stable/Uncomplicated  Clinical Presentation Rationale: based on medical and personal factors listed in PT evaluation  Clinical Decision Making (Complexity): Low complexity    PLAN OF CARE  Treatment Interventions:  Modalities: Cryotherapy, Dry Needling, E-stim, Iontophoresis, Vasoneumatic Device  Interventions: Manual Therapy, Neuromuscular Re-education, Therapeutic Activity, Therapeutic Exercise    Long Term Goals     PT Goal 1  Goal Identifier: STG 1  Goal Description: Patient will be independent with a short-term home exercise program.  Target Date: 01/03/24  PT Goal 2  Goal Identifier: STG 2  Goal Description:  Patient will understand and demonstrate improved posture and techniques such that patient places less strain over periscapular muscualture and cervical spine.  Target Date: 01/03/24  PT Goal 3  Goal Identifier: LTG 1  Goal Description: Improve score on utilized outcome measures to correlate with clinically significant change.  Goal Progress: SPADI Score 14.62 at evaluation  Target Date: 01/31/24  PT Goal 4  Goal Identifier: LTG 2  Goal Description: Patient will endorse confidence in ability to manage home exercise program independently to promote continued progression and management of shoulder and neck pain symptoms following discharge from PT services.  Target Date: 01/31/24      Frequency of Treatment: 1x/week  Duration of Treatment: 8 weeks    Education Assessment:   Learner/Method: Patient;Listening;Reading;Demonstration;Pictures/Video;No Barriers to Learning    Risks and benefits of evaluation/treatment have been explained.   Patient/Family/caregiver agrees with Plan of Care.     Evaluation Time:     PT Eval, Low Complexity Minutes (20243): 22       Signing Clinician: Celia Santana PT      [facility]                                                                                   OUTPATIENT PHYSICAL THERAPY      PLAN OF TREATMENT FOR OUTPATIENT REHABILITATION   Patient's Last Name, First Name, Atul Cassidy YOB: 1955   Provider's Name   [facility]   Medical Record No.  [id number]     Onset Date: 11/08/23  Start of Care Date: 12/06/23     Medical Diagnosis:  Right shoulder pain      PT Treatment Diagnosis:  Right shoulder pain Plan of Treatment  Frequency/Duration: 1x/week/ 8 weeks    Certification date from 12/06/23 to 01/31/24         See note for plan of treatment details and functional goals     Celia Santana PT                         I CERTIFY THE NEED FOR THESE SERVICES FURNISHED UNDER        THIS PLAN OF TREATMENT  AND WHILE UNDER MY CARE     (Physician attestation of this document indicates review and certification of the therapy plan).              Referring Provider:  Nydia Dexter    Initial Assessment  See Epic Evaluation- Start of Care Date: 12/06/23

## 2023-12-13 ENCOUNTER — THERAPY VISIT (OUTPATIENT)
Dept: PHYSICAL THERAPY | Facility: HOSPITAL | Age: 68
End: 2023-12-13
Attending: FAMILY MEDICINE
Payer: MEDICARE

## 2023-12-13 DIAGNOSIS — M25.511 CHRONIC PAIN OF BOTH SHOULDERS: Primary | ICD-10-CM

## 2023-12-13 DIAGNOSIS — M25.512 CHRONIC PAIN OF BOTH SHOULDERS: Primary | ICD-10-CM

## 2023-12-13 DIAGNOSIS — G89.29 CHRONIC PAIN OF BOTH SHOULDERS: Primary | ICD-10-CM

## 2023-12-13 PROCEDURE — 97530 THERAPEUTIC ACTIVITIES: CPT | Mod: GP

## 2023-12-13 PROCEDURE — 97110 THERAPEUTIC EXERCISES: CPT | Mod: GP

## 2023-12-20 ENCOUNTER — THERAPY VISIT (OUTPATIENT)
Dept: PHYSICAL THERAPY | Facility: HOSPITAL | Age: 68
End: 2023-12-20
Attending: FAMILY MEDICINE
Payer: MEDICARE

## 2023-12-20 DIAGNOSIS — G89.29 CHRONIC PAIN OF BOTH SHOULDERS: Primary | ICD-10-CM

## 2023-12-20 DIAGNOSIS — M25.511 CHRONIC PAIN OF BOTH SHOULDERS: Primary | ICD-10-CM

## 2023-12-20 DIAGNOSIS — M25.512 CHRONIC PAIN OF BOTH SHOULDERS: Primary | ICD-10-CM

## 2023-12-20 PROCEDURE — 97110 THERAPEUTIC EXERCISES: CPT | Mod: GP

## 2023-12-20 PROCEDURE — 97530 THERAPEUTIC ACTIVITIES: CPT | Mod: GP

## 2024-01-03 ENCOUNTER — THERAPY VISIT (OUTPATIENT)
Dept: PHYSICAL THERAPY | Facility: HOSPITAL | Age: 69
End: 2024-01-03
Attending: FAMILY MEDICINE
Payer: MEDICARE

## 2024-01-03 DIAGNOSIS — M25.511 CHRONIC PAIN OF BOTH SHOULDERS: Primary | ICD-10-CM

## 2024-01-03 DIAGNOSIS — M25.512 CHRONIC PAIN OF BOTH SHOULDERS: Primary | ICD-10-CM

## 2024-01-03 DIAGNOSIS — G89.29 CHRONIC PAIN OF BOTH SHOULDERS: Primary | ICD-10-CM

## 2024-01-03 PROCEDURE — 97110 THERAPEUTIC EXERCISES: CPT | Mod: GP,CQ

## 2024-01-10 ENCOUNTER — THERAPY VISIT (OUTPATIENT)
Dept: PHYSICAL THERAPY | Facility: HOSPITAL | Age: 69
End: 2024-01-10
Attending: FAMILY MEDICINE
Payer: MEDICARE

## 2024-01-10 DIAGNOSIS — M25.512 CHRONIC PAIN OF BOTH SHOULDERS: Primary | ICD-10-CM

## 2024-01-10 DIAGNOSIS — M25.511 CHRONIC PAIN OF BOTH SHOULDERS: Primary | ICD-10-CM

## 2024-01-10 DIAGNOSIS — G89.29 CHRONIC PAIN OF BOTH SHOULDERS: Primary | ICD-10-CM

## 2024-01-10 PROCEDURE — 97530 THERAPEUTIC ACTIVITIES: CPT | Mod: GP

## 2024-01-10 PROCEDURE — 97110 THERAPEUTIC EXERCISES: CPT | Mod: GP

## 2024-02-12 DIAGNOSIS — E78.5 HYPERLIPIDEMIA LDL GOAL <130: ICD-10-CM

## 2024-02-12 RX ORDER — ROSUVASTATIN CALCIUM 10 MG/1
10 TABLET, COATED ORAL DAILY
Qty: 90 TABLET | Refills: 1 | Status: SHIPPED | OUTPATIENT
Start: 2024-02-12 | End: 2024-08-19

## 2024-02-14 ENCOUNTER — LAB (OUTPATIENT)
Dept: LAB | Facility: OTHER | Age: 69
End: 2024-02-14
Payer: MEDICARE

## 2024-02-14 DIAGNOSIS — E78.5 HYPERLIPIDEMIA LDL GOAL <130: ICD-10-CM

## 2024-02-14 DIAGNOSIS — R73.09 ELEVATED GLUCOSE: ICD-10-CM

## 2024-02-14 LAB
CHOLEST SERPL-MCNC: 182 MG/DL
EST. AVERAGE GLUCOSE BLD GHB EST-MCNC: 117 MG/DL
FASTING STATUS PATIENT QL REPORTED: YES
FASTING STATUS PATIENT QL REPORTED: YES
GLUCOSE SERPL-MCNC: 112 MG/DL (ref 70–99)
HBA1C MFR BLD: 5.7 %
HDLC SERPL-MCNC: 72 MG/DL
LDLC SERPL CALC-MCNC: 87 MG/DL
NONHDLC SERPL-MCNC: 110 MG/DL
TRIGL SERPL-MCNC: 117 MG/DL

## 2024-02-14 PROCEDURE — 83036 HEMOGLOBIN GLYCOSYLATED A1C: CPT | Mod: ZL

## 2024-02-14 PROCEDURE — 36415 COLL VENOUS BLD VENIPUNCTURE: CPT | Mod: ZL

## 2024-02-14 PROCEDURE — 80061 LIPID PANEL: CPT | Mod: ZL

## 2024-02-14 PROCEDURE — 82947 ASSAY GLUCOSE BLOOD QUANT: CPT | Mod: ZL

## 2024-03-11 ENCOUNTER — OFFICE VISIT (OUTPATIENT)
Dept: FAMILY MEDICINE | Facility: OTHER | Age: 69
End: 2024-03-11
Attending: NURSE PRACTITIONER
Payer: COMMERCIAL

## 2024-03-11 VITALS
BODY MASS INDEX: 28.93 KG/M2 | OXYGEN SATURATION: 95 % | HEIGHT: 69 IN | RESPIRATION RATE: 16 BRPM | WEIGHT: 195.3 LBS | DIASTOLIC BLOOD PRESSURE: 88 MMHG | TEMPERATURE: 98 F | HEART RATE: 83 BPM | SYSTOLIC BLOOD PRESSURE: 130 MMHG

## 2024-03-11 DIAGNOSIS — J01.90 ACUTE SINUSITIS WITH SYMPTOMS GREATER THAN 10 DAYS: Primary | ICD-10-CM

## 2024-03-11 PROCEDURE — 99213 OFFICE O/P EST LOW 20 MIN: CPT | Performed by: NURSE PRACTITIONER

## 2024-03-11 PROCEDURE — G0463 HOSPITAL OUTPT CLINIC VISIT: HCPCS

## 2024-03-11 RX ORDER — DOXYCYCLINE 100 MG/1
100 CAPSULE ORAL 2 TIMES DAILY
Qty: 20 CAPSULE | Refills: 0 | Status: SHIPPED | OUTPATIENT
Start: 2024-03-11 | End: 2024-03-21

## 2024-03-11 ASSESSMENT — PAIN SCALES - GENERAL: PAINLEVEL: NO PAIN (0)

## 2024-03-11 NOTE — PROGRESS NOTES
"Assessment & Plan     Acute sinusitis with symptoms greater than 10 days  Patient experiencing symptoms for 17 days, with period of improvement followed by worsening symptoms. Patient has penicillin allergy. Will treat with doxycycline. Discussed symptomatic care. Return to clinic for new or worsening symptoms.     - doxycycline hyclate (VIBRAMYCIN) 100 MG capsule  Dispense: 20 capsule; Refill: 0    Prescription drug management      Subjective   Eliazar is a 69 year old, presenting for the following health issues:  Sinus Problem    HPI     Acute Illness  Acute illness concerns: Sinus pressure, drainage  Onset/Duration: 02/22/2024 - overall felt he was getting better and then got worse     Symptoms:  Fever: Friday was 100.0 but usually low 99s  Chills/Sweats: YES when going to bed  Headache (location?): YES- inconsistent  Sinus Pressure: YES  Conjunctivitis:  No  Ear Pain: no ears are popping - was painful but no it isn't, left side only  Rhinorrhea: YES - clear or yellow, once in a while green  Congestion: YES - mostly left side   Sore Throat: YES from drainage and cough  Cough: YES-sometimes non-productive, sometimes productive of clear sputum, productive of yellow sputum, productive of green sputum, mostly yellow and clear  Wheeze: YES- started this weekend, during expiration   Decreased Appetite: No  Nausea: No  Vomiting: No  Diarrhea: No  Dysuria/Freq.: No  Dysuria or Hematuria: No  Fatigue/Achiness: YES fatigue  Sick/Strep Exposure: No  Therapies tried and outcome: None  Year-round allergies - allegra daily, azelastine BID, tried saline rinse but got too clogged    Non-smoker, no history of COPD, asthma    Review of Systems  Constitutional, HEENT, cardiovascular, pulmonary, gi and gu systems are negative, except as otherwise noted.      Objective    /88   Pulse 83   Temp 98  F (36.7  C) (Tympanic)   Resp 16   Ht 1.753 m (5' 9\")   Wt 88.6 kg (195 lb 4.8 oz)   SpO2 95%   BMI 28.84 kg/m    Body mass " index is 28.84 kg/m .    Physical Exam   GENERAL: alert and no distress  EYES: Eyes grossly normal to inspection, PERRL and conjunctivae and sclerae normal  HENT: ear canals and TM's normal, nose and mouth without ulcers or lesions. No pain on palpation of frontal and maxillary sinuses.   NECK: no adenopathy, no asymmetry, masses, or scars  RESP: lungs clear to auscultation - no rales, rhonchi or wheezes  CV: regular rate and rhythm, normal S1 S2, no S3 or S4, no murmur, click or rub, no peripheral edema  NEURO: mentation intact and speech normal  PSYCH: affect normal/bright      Carla Castro, PA-S2     I was present with the nurse practitioner student who participated in the service and in the documentation of the note. I have verified the history and personally performed the physical exam and medical decision making. I agree with the assessment and plan of care as documented in the note.     Analia Gabriel, CNP

## 2024-04-15 ENCOUNTER — TELEPHONE (OUTPATIENT)
Dept: FAMILY MEDICINE | Facility: OTHER | Age: 69
End: 2024-04-15

## 2024-04-15 DIAGNOSIS — Z12.11 ENCOUNTER FOR SCREENING COLONOSCOPY: Primary | ICD-10-CM

## 2024-04-15 RX ORDER — BISACODYL 5 MG/1
10 TABLET, DELAYED RELEASE ORAL ONCE
Qty: 2 TABLET | Refills: 0 | Status: SHIPPED | OUTPATIENT
Start: 2024-04-15 | End: 2024-04-15

## 2024-04-15 NOTE — TELEPHONE ENCOUNTER
Screening Questions for the Scheduling of Screening Colonoscopies     (If Colonoscopy is diagnostic, Provider should review the chart before scheduling.)    Are you younger than 50 or older than 80?  No    Do you take aspirin or fish oil?  Yes:  (if yes, tell patient to stop 1 week prior to Colonoscopy)    Do you take warfarin (Coumadin), clopidogrel (Plavix), apixaban (Eliquis), dabigatram (Pradaxa), rivaroxaban (Xarelto) or any blood thinner? No    Do you use oxygen at home?  No    Do you have kidney disease? No    Are you on dialysis? No    Have you had a stroke or heart attack in the last year? No    Have you had a stent in your heart or any blood vessel in the last year? No    Have you had a transplant of any organ?  No    Have you had a colonoscopy or upper endoscopy (EGD) before?  YES. Date-.         Date of scheduled Colonoscopy:7/31/24     Provider: Dr. Lata Welch Brick

## 2024-04-15 NOTE — TELEPHONE ENCOUNTER
Patient scheduled screening colonoscopy 07/31/24 with yanick Lira bowel prep ordered to [facility], instructions mailed today.

## 2024-07-24 ENCOUNTER — ANESTHESIA EVENT (OUTPATIENT)
Dept: SURGERY | Facility: HOSPITAL | Age: 69
End: 2024-07-24

## 2024-07-24 NOTE — ANESTHESIA PREPROCEDURE EVALUATION
Anesthesia Pre-Procedure Evaluation    Patient: Atul Coker   MRN: [id number] : 1955        Procedure : Procedure(s):  COLONOSCOPY          Past Medical History:   Diagnosis Date     Allergic rhinitis, cause unspecified 2013    seasonal     Bilateral shoulder pain 2023     Branch retinal vein occlusion of both eyes (H28)     bilateral, still getting injections with vitreoretinal group     CMV (cytomegalovirus infection) status positive (H)     remote     Concussion without loss of consciousness     fell skiing -- no memory issues or HAs, also had rotator cuff issues from fall     Hyperplastic colonic polyp      Infection due to 2019 novel coronavirus 10/13/2022    did ok with it     Other abnormal glucose 2013    prediabetes     Pure hypercholesterolemia 2012     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      Past Surgical History:   Procedure Laterality Date     CARPAL TUNNEL RELEASE RT/LT Right     Dr Hinton     CARPAL TUNNEL RELEASE RT/LT Bilateral 2022    Keegan -- done at [facility]     COLONOSCOPY  2006    repeat in 10 yrs     COLONOSCOPY N/A 2019    due in   Procedure: COLONOSCOPY WITH POLYPECTOMY;  Surgeon: David Groves MD;  Location: [facility]     EXCISE LESION BACK N/A 2022    Procedure: Excision of skin lesion on back;  Surgeon: David Groves MD;  Location: [facility]     EXCISE LESION NECK Left 2022    Procedure: Re-Excision of Lesion on Neck, left;  Surgeon: David Groves MD;  Location: [facility]     EXCISE LESION TRUNK N/A 2022    Procedure: Excision of skin lesions chest x2;  Surgeon: David Groves MD;  Location: [facility]     HERNIA REPAIR, INGUINAL RT/LT Right 2013    Right side     UPPER GI ENDOSCOPY      found vocal cord nodules     VASECTOMY  2013      Allergies   Allergen Reactions     Seasonal Allergies      Penicillins Rash      Social History      Tobacco Use     Smoking status: Never     Smokeless tobacco: Never   Substance Use Topics     Alcohol use: Yes     Alcohol/week: 7.0 standard drinks of alcohol     Types: 7 Standard drinks or equivalent per week     Comment: occasionally      Wt Readings from Last 1 Encounters:   03/11/24 88.6 kg (195 lb 4.8 oz)        Anesthesia Evaluation   Pt has had prior anesthetic. Type: MAC.        ROS/MED HX  ENT/Pulmonary: Comment: Vocal cord nodules      (+)     AMANDA risk factors, snores loudly,     allergic rhinitis,                             Neurologic: Comment: Paresthesias      Cardiovascular:     (+) Dyslipidemia - -   -  - -   Taking blood thinners  Instructions Given to patient: asa.                            Previous cardiac testing   Echo: Date: Results:    Stress Test:  Date: Results:    ECG Reviewed:  Date: 2/11/22 Results:  appears normal, NSR, normal axis, normal intervals, no acute ST/T changes c/w ischemia, no LVH by voltage criteria, there are no prior tracings available  Cath:  Date: Results:      METS/Exercise Tolerance:     Hematologic:  - neg hematologic  ROS     Musculoskeletal: Comment: Cervical disc disorder at C6-C7 level with radiculopathy      GI/Hepatic: Comment: CMV positive (prior to 2013?)    (+) GERD, Asymptomatic on medication,      bowel prep,            Renal/Genitourinary:  - neg Renal ROS     Endo:  - neg endo ROS     Psychiatric/Substance Use:  - neg psychiatric ROS     Infectious Disease:  - neg infectious disease ROS     Malignancy:   (+) Malignancy, History of Skin.Skin CA status post Surgery.      Other:  - neg other ROS    (+)  , H/O Chronic Pain,         Physical Exam    Airway  airway exam normal      Mallampati: II   TM distance: > 3 FB   Neck ROM: full   Mouth opening: > 3 cm    Respiratory Devices and Support         Dental       (+) Minor Abnormalities - some fillings, tiny chips      Cardiovascular   cardiovascular exam normal       Rhythm and rate: regular and normal  "    Pulmonary   pulmonary exam normal        breath sounds clear to auscultation       OUTSIDE LABS:  CBC:   Lab Results   Component Value Date    WBC 6.7 04/06/2022    WBC 5.7 02/11/2022    HGB 15.5 04/06/2022    HGB 16.2 02/11/2022    HCT 45.6 04/06/2022    HCT 47.8 02/11/2022     04/06/2022     02/11/2022     BMP:   Lab Results   Component Value Date     11/08/2023     11/02/2022    POTASSIUM 4.1 11/08/2023    POTASSIUM 4.7 11/02/2022    CHLORIDE 103 11/08/2023    CHLORIDE 103 11/02/2022    CO2 21 (L) 11/08/2023    CO2 22 11/02/2022    BUN 13.1 11/08/2023    BUN 12.0 11/02/2022    CR 1.13 11/08/2023    CR 1.02 11/02/2022     (H) 02/14/2024     (H) 11/08/2023     COAGS: No results found for: \"PTT\", \"INR\", \"FIBR\"  POC: No results found for: \"BGM\", \"HCG\", \"HCGS\"  HEPATIC:   Lab Results   Component Value Date    ALBUMIN 4.4 11/08/2023    PROTTOTAL 7.4 11/08/2023    ALT 35 11/08/2023    AST 27 11/08/2023    ALKPHOS 118 11/08/2023    BILITOTAL 0.7 11/08/2023     OTHER:   Lab Results   Component Value Date    A1C 5.7 (H) 02/14/2024    FAVIO 9.7 11/08/2023    MAG 2.2 02/07/2020    TSH 1.18 11/12/2021       Anesthesia Plan    ASA Status:  2    NPO Status:  NPO Appropriate    Anesthesia Type: MAC.     - Reason for MAC: straight local not clinically adequate              Consents    Anesthesia Plan(s) and associated risks, benefits, and realistic alternatives discussed. Questions answered and patient/representative(s) expressed understanding.     - Discussed: Risks, Benefits and Alternatives for BOTH SEDATION and the PROCEDURE were discussed     - Discussed with:  Patient      - Extended Intubation/Ventilatory Support Discussed: No.      - Patient is DNR/DNI Status: No     Use of blood products discussed: No .     Postoperative Care            Comments:               SHAHZAD Lopez CNP    I have reviewed the pertinent notes and labs in the chart from the past 30 days and " (re)examined the patient.  Any updates or changes from those notes are reflected in this note.

## 2024-07-29 NOTE — OR NURSING
Patient called to report testing positive for covid.   Advised patient will need to reschedule.   Message sent to scheduling team.

## 2024-07-31 ENCOUNTER — ANESTHESIA (OUTPATIENT)
Dept: SURGERY | Facility: HOSPITAL | Age: 69
End: 2024-07-31
Payer: COMMERCIAL

## 2024-08-19 DIAGNOSIS — E78.5 HYPERLIPIDEMIA LDL GOAL <130: ICD-10-CM

## 2024-08-19 RX ORDER — ROSUVASTATIN CALCIUM 10 MG/1
10 TABLET, COATED ORAL DAILY
Qty: 90 TABLET | Refills: 1 | Status: SHIPPED | OUTPATIENT
Start: 2024-08-19

## 2024-08-19 NOTE — TELEPHONE ENCOUNTER
Crestor       Last Written Prescription Date:  2/12/2024  Last Fill Quantity: 90,   # refills: 1  Last Office Visit: 3/11/2024  Future Office visit:    Next 5 appointments (look out 90 days)      Nov 11, 2024 9:00 AM  (Arrive by 8:45 AM)  Adult Preventative Visit with Nydia Dexter MD  [facility] [place] ([facility] [place] ) [address]  [place] [address]  [phone]

## 2024-09-18 NOTE — DISCHARGE INSTRUCTIONS
Colonoscopy: What to Expect at Home  Your Recovery  After a colonoscopy, you'll stay at the clinic until you wake up. Then you can go home. But you'll need to arrange for a ride. Your doctor will tell you when you can eat and do your other usual activities.  Your doctor will talk to you about when you'll need your next colonoscopy. Your doctor can help you decide how often you need to be checked. This will depend on the results of your test and your risk for colorectal cancer.  After the test, you may be bloated or have gas pains. You may need to pass gas. If a biopsy was done or a polyp was removed, you may have streaks of blood in your stool (feces) for a few days. Problems such as heavy rectal bleeding may not occur until several weeks after the test. This isn't common. But it can happen after polyps are removed.  This care sheet gives you a general idea about how long it will take for you to recover. But each person recovers at a different pace. Follow the steps below to get better as quickly as possible.  How can you care for yourself at home?  Activity    Rest when you feel tired.     You can do your normal activities when it feels okay to do so.   Diet    Follow your doctor's directions for eating.     Unless your doctor has told you not to, drink plenty of fluids. This helps to replace the fluids that were lost during the colon prep.     Do not drink alcohol.   Medicines    Your doctor will tell you if and when you can restart your medicines. You will also be given instructions about taking any new medicines.     If you stopped taking aspirin or some other blood thinner, your doctor will tell you when to start taking it again.     If polyps were removed or a biopsy was done during the test, your doctor may tell you not to take aspirin or other anti-inflammatory medicines for a few days. These include ibuprofen (Advil, Motrin) and naproxen (Aleve).   Other instructions    For your safety, do not drive or  "operate machinery until the medicine wears off and you can think clearly. Your doctor may tell you not to drive or operate machinery until the day after your test.     Do not sign legal documents or make major decisions until the medicine wears off and you can think clearly. The anesthesia can make it hard for you to fully understand what you are agreeing to.   Follow-up care is a key part of your treatment and safety. Be sure to make and go to all appointments, and call your doctor if you are having problems. It's also a good idea to know your test results and keep a list of the medicines you take.  When should you call for help?   Call 911 anytime you think you may need emergency care. For example, call if:    You passed out (lost consciousness).     You pass maroon or bloody stools.     You have trouble breathing.   Call your doctor now or seek immediate medical care if:    You have pain that does not get better after you take pain medicine.     You are sick to your stomach or cannot drink fluids.     You have new or worse belly pain.     You have blood in your stools.     You have a fever.     You cannot pass stools or gas.   Watch closely for changes in your health, and be sure to contact your doctor if you have any problems.  Where can you learn more?  Go to https://www.Department of Health and Human Services.net/patiented  Enter E264 in the search box to learn more about \"Colonoscopy: What to Expect at Home.\"  Current as of: October 25, 2023               Content Version: 14.0    [id number] [employer].   Care instructions adapted under license by your healthcare professional. If you have questions about a medical condition or this instruction, always ask your healthcare professional. [employer] disclaims any warranty or liability for your use of this information.    After Anesthesia (Sleep Medicine)  What should I do after anesthesia?  You should rest and relax for the next 24 hours. Avoid risky or difficult " (strenuous) activity. A responsible adult should stay with you overnight.  Don't drive or use any heavy equipment for 24 hours. Even if you feel normal, your reactions may be affected by the sleep medicine given to you.  Don't drink alcohol or make any important decisions for 24 hours.  Slowly get back to your regular diet, as you feel able.  How should I expect to feel?  It's normal to feel dizzy, light-headed, or faint for up to a full day after anesthesia or while taking pain medicine. If this happens:   Sit down for a few minutes before standing.  Have someone help you when you get up to walk or use the bathroom.  If you have nausea (feel sick to your stomach) or vomit (throw up):   Drink clear liquids (such as apple juice, ginger ale, broth, or 7UP) until you feel better.  If you feel sick to your stomach, or you keep vomiting for 24 hours, please call the doctor.  What else should I know?  You might have a dry mouth, sore throat, muscle aches, or trouble sleeping. These should go away after 24 hours.  Please contact your doctor if you have any other symptoms that concern you, such as fever, pain, bleeding, fluid drainage, swelling, or headache, or if it's been over 8 to 10 hours and you still aren't able to pee (urinate).  If you have a history of sleep apnea, it's very important to use your CPAP machine for the next 24 hours when you nap or sleep.   For informational purposes only. Not to replace the advice of your health care provider. Copyright   2023 [facility]. All rights reserved. Clinically reviewed by Mick Howell MD. [employer] [id number] - REV 09/23.

## 2024-09-20 ENCOUNTER — HOSPITAL ENCOUNTER (OUTPATIENT)
Facility: HOSPITAL | Age: 69
Discharge: HOME | End: 2024-09-20
Attending: SURGERY | Admitting: SURGERY
Payer: MEDICARE

## 2024-09-20 VITALS
HEIGHT: 69 IN | OXYGEN SATURATION: 95 % | WEIGHT: 195 LBS | HEART RATE: 67 BPM | RESPIRATION RATE: 16 BRPM | DIASTOLIC BLOOD PRESSURE: 80 MMHG | BODY MASS INDEX: 28.88 KG/M2 | TEMPERATURE: 97 F | SYSTOLIC BLOOD PRESSURE: 132 MMHG

## 2024-09-20 PROCEDURE — 370N000017 HC ANESTHESIA TECHNICAL FEE, PER MIN: Performed by: STUDENT IN AN ORGANIZED HEALTH CARE EDUCATION/TRAINING PROGRAM

## 2024-09-20 PROCEDURE — 250N000011 HC RX IP 250 OP 636: Performed by: NURSE ANESTHETIST, CERTIFIED REGISTERED

## 2024-09-20 PROCEDURE — 250N000009 HC RX 250: Performed by: NURSE ANESTHETIST, CERTIFIED REGISTERED

## 2024-09-20 PROCEDURE — 999N000141 HC STATISTIC PRE-PROCEDURE NURSING ASSESSMENT: Performed by: STUDENT IN AN ORGANIZED HEALTH CARE EDUCATION/TRAINING PROGRAM

## 2024-09-20 PROCEDURE — 710N000012 HC RECOVERY PHASE 2, PER MINUTE: Performed by: STUDENT IN AN ORGANIZED HEALTH CARE EDUCATION/TRAINING PROGRAM

## 2024-09-20 PROCEDURE — G0105 COLORECTAL SCRN; HI RISK IND: HCPCS | Performed by: STUDENT IN AN ORGANIZED HEALTH CARE EDUCATION/TRAINING PROGRAM

## 2024-09-20 PROCEDURE — 360N000075 HC SURGERY LEVEL 2, PER MIN: Performed by: STUDENT IN AN ORGANIZED HEALTH CARE EDUCATION/TRAINING PROGRAM

## 2024-09-20 PROCEDURE — 258N000003 HC RX IP 258 OP 636: Performed by: NURSE PRACTITIONER

## 2024-09-20 PROCEDURE — 45378 DIAGNOSTIC COLONOSCOPY: CPT | Performed by: NURSE ANESTHETIST, CERTIFIED REGISTERED

## 2024-09-20 PROCEDURE — 272N000001 HC OR GENERAL SUPPLY STERILE: Performed by: STUDENT IN AN ORGANIZED HEALTH CARE EDUCATION/TRAINING PROGRAM

## 2024-09-20 RX ORDER — NALOXONE HYDROCHLORIDE 0.4 MG/ML
0.1 INJECTION, SOLUTION INTRAMUSCULAR; INTRAVENOUS; SUBCUTANEOUS
Status: DISCONTINUED | OUTPATIENT
Start: 2024-09-20 | End: 2024-09-20 | Stop reason: HOSPADM

## 2024-09-20 RX ORDER — LIDOCAINE 40 MG/G
CREAM TOPICAL
Status: DISCONTINUED | OUTPATIENT
Start: 2024-09-20 | End: 2024-09-20 | Stop reason: HOSPADM

## 2024-09-20 RX ORDER — ONDANSETRON 2 MG/ML
4 INJECTION INTRAMUSCULAR; INTRAVENOUS EVERY 30 MIN PRN
Status: DISCONTINUED | OUTPATIENT
Start: 2024-09-20 | End: 2024-09-20 | Stop reason: HOSPADM

## 2024-09-20 RX ORDER — LIDOCAINE HYDROCHLORIDE 20 MG/ML
INJECTION, SOLUTION INFILTRATION; PERINEURAL PRN
Status: DISCONTINUED | OUTPATIENT
Start: 2024-09-20 | End: 2024-09-20

## 2024-09-20 RX ORDER — ONDANSETRON 4 MG/1
4 TABLET, ORALLY DISINTEGRATING ORAL EVERY 30 MIN PRN
Status: DISCONTINUED | OUTPATIENT
Start: 2024-09-20 | End: 2024-09-20 | Stop reason: HOSPADM

## 2024-09-20 RX ORDER — SODIUM CHLORIDE, SODIUM LACTATE, POTASSIUM CHLORIDE, CALCIUM CHLORIDE 600; 310; 30; 20 MG/100ML; MG/100ML; MG/100ML; MG/100ML
INJECTION, SOLUTION INTRAVENOUS CONTINUOUS
Status: DISCONTINUED | OUTPATIENT
Start: 2024-09-20 | End: 2024-09-20 | Stop reason: HOSPADM

## 2024-09-20 RX ORDER — PROPOFOL 10 MG/ML
INJECTION, EMULSION INTRAVENOUS CONTINUOUS PRN
Status: DISCONTINUED | OUTPATIENT
Start: 2024-09-20 | End: 2024-09-20

## 2024-09-20 RX ORDER — PROPOFOL 10 MG/ML
INJECTION, EMULSION INTRAVENOUS PRN
Status: DISCONTINUED | OUTPATIENT
Start: 2024-09-20 | End: 2024-09-20

## 2024-09-20 RX ORDER — DEXAMETHASONE SODIUM PHOSPHATE 10 MG/ML
4 INJECTION, SOLUTION INTRAMUSCULAR; INTRAVENOUS
Status: DISCONTINUED | OUTPATIENT
Start: 2024-09-20 | End: 2024-09-20 | Stop reason: HOSPADM

## 2024-09-20 RX ADMIN — PROPOFOL 30 MG: 10 INJECTION, EMULSION INTRAVENOUS at 10:02

## 2024-09-20 RX ADMIN — PROPOFOL 50 MG: 10 INJECTION, EMULSION INTRAVENOUS at 10:00

## 2024-09-20 RX ADMIN — PROPOFOL 200 MCG/KG/MIN: 10 INJECTION, EMULSION INTRAVENOUS at 10:00

## 2024-09-20 RX ADMIN — LIDOCAINE HYDROCHLORIDE 100 MG: 20 INJECTION, SOLUTION INFILTRATION; PERINEURAL at 10:00

## 2024-09-20 RX ADMIN — SODIUM CHLORIDE, POTASSIUM CHLORIDE, SODIUM LACTATE AND CALCIUM CHLORIDE: 600; 310; 30; 20 INJECTION, SOLUTION INTRAVENOUS at 09:22

## 2024-09-20 ASSESSMENT — ACTIVITIES OF DAILY LIVING (ADL)
ADLS_ACUITY_SCORE: 21
ADLS_ACUITY_SCORE: 23
ADLS_ACUITY_SCORE: 23

## 2024-09-20 NOTE — OR NURSING
Patient and responsible adult given discharge instructions with no questions regarding instructions. Navjot score 20/20. Pain level 0/10.  Discharged from unit via ambulatory. Patient discharged to home with brother Miller.

## 2024-09-20 NOTE — H&P
Atul Coker MRN# [id number]   YOB: 1955 Age: 69 year old      Date of Admission:  9/20/2024    Primary care provider: Nydia Dexter    PREOPERATIVE DIAGNOSIS:  Screening colonoscopy       POSTOPERATIVE DIAGNOSIS:  Normal colonoscopy       PROCEDURE:  Colonoscopy         INDICATIONS:  Screening colonoscopy.       SURGEON: Aryan Wagner MD    DESCRIPTION OF PROCEDURE: Atul Coker was brought into the endoscopy suite and placed in the left lateral decubitus position. After preprocedural pause and attended monitored anesthesia was administered, the external anus was inspected and was within normal limits. Digital rectal exam was within normal limits. The colonoscope was inserted and advanced under direct visualization to the level of the cecum which was identified by the appendiceal orifice and the ileocecal valve. The prep was excellent. Upon slow withdrawal of the colonoscope, approximately 95% of the mucosa was directly visualized.     There was a mild amount of sigmoid diverticulosis visualized.     The rest of the colon was without mucosal abnormality. There was no evidence of further polyps, diverticula, inflammation, bleeding or AVMs. Retroflexion of the rectum was within normal limits. The extra air was removed from the colon, and the colonoscope withdrawn. The patient tolerated the procedure well and was taken to postanesthesia care unit.     We invite the patient to return in 10 years for follow up screening evaluation.    Aryan Wagner MD

## 2024-09-20 NOTE — ANESTHESIA POSTPROCEDURE EVALUATION
Patient: Atul Coker    Procedure: Procedure(s):  COLONOSCOPY       Anesthesia Type:  MAC    Note:  Disposition: Outpatient   Postop Pain Control: Uneventful            Sign Out: Well controlled pain   PONV: No   Neuro/Psych: Uneventful            Sign Out: Acceptable/Baseline neuro status   Airway/Respiratory: Uneventful            Sign Out: Acceptable/Baseline resp. status   CV/Hemodynamics: Uneventful            Sign Out: Acceptable CV status; No obvious hypovolemia; No obvious fluid overload   Other NRE: NONE   DID A NON-ROUTINE EVENT OCCUR? No           Last vitals:  Vitals Value Taken Time   /80 09/20/24 1050   Temp 97  F (36.1  C) 09/20/24 1050   Pulse 67 09/20/24 1050   Resp 16 09/20/24 1050   SpO2 95 % 09/20/24 1052   Vitals shown include unfiled device data.    Electronically Signed By: SHAHZAD Vieira CRNA  September 20, 2024  12:18 PM

## 2024-09-20 NOTE — ANESTHESIA CARE TRANSFER NOTE
Patient: Atul Coker    Procedure: Procedure(s):  COLONOSCOPY       Diagnosis: Encounter for screening colonoscopy [Z12.11]  Diagnosis Additional Information: No value filed.    Anesthesia Type:   MAC     Note:    Oropharynx: spontaneously breathing  Level of Consciousness: awake  Oxygen Supplementation: room air    Independent Airway: airway patency satisfactory and stable  Dentition: dentition unchanged  Vital Signs Stable: post-procedure vital signs reviewed and stable  Report to RN Given: handoff report given  Patient transferred to: Phase II    Handoff Report: Identifed the Patient, Identified the Reponsible Provider, Reviewed the pertinent medical history, Discussed the surgical course, Reviewed Intra-OP anesthesia mangement and issues during anesthesia, Set expectations for post-procedure period and Allowed opportunity for questions and acknowledgement of understanding  Vitals:  Vitals Value Taken Time   BP     Temp     Pulse     Resp     SpO2         Electronically Signed By: SHAHZAD Rodriguez CRNA  September 20, 2024  10:22 AM

## 2024-09-20 NOTE — OP NOTE
Atul Coker MRN# [id number]   YOB: 1955 Age: 69 year old      Date of Admission:  9/20/2024    Primary care provider: Nydia Dexter    PREOPERATIVE DIAGNOSIS:  Screening colonoscopy       POSTOPERATIVE DIAGNOSIS:  Normal colonoscopy       PROCEDURE:  Colonoscopy         INDICATIONS:  Screening colonoscopy.       SURGEON: Aryan Wagner MD    DESCRIPTION OF PROCEDURE: Atul Coker was brought into the endoscopy suite and placed in the left lateral decubitus position. After preprocedural pause and attended monitored anesthesia was administered, the external anus was inspected and was within normal limits. Digital rectal exam was within normal limits. The colonoscope was inserted and advanced under direct visualization to the level of the cecum which was identified by the appendiceal orifice and the ileocecal valve. The prep was excellent. Upon slow withdrawal of the colonoscope, approximately 95% of the mucosa was directly visualized.     There was a mild amount of sigmoid diverticulosis visualized.     The rest of the colon was without mucosal abnormality. There was no evidence of further polyps, diverticula, inflammation, bleeding or AVMs. Retroflexion of the rectum was within normal limits. The extra air was removed from the colon, and the colonoscope withdrawn. The patient tolerated the procedure well and was taken to postanesthesia care unit.     We invite the patient to return in 10 years for follow up screening evaluation.    Aryan Wagner MD

## 2024-09-20 NOTE — H&P
[facility] Pre-Op H&P    CHIEF COMPLAINT:  Colonoscopy      HISTORY OF PRESENT ILLNESS:  Atul Coker is a 69 year old male who is seen in today for  routine screening colonoscopy.    REVIEW OF SYSTEMS:  10 point review of symptoms completed and negative unless otherwise listed in HPI    Past Medical History:   Diagnosis Date    Allergic rhinitis, cause unspecified 03/05/2013    seasonal    Bilateral shoulder pain 04/2023    Branch retinal vein occlusion of both eyes (H28)     bilateral, still getting injections with vitreoretinal group    CMV (cytomegalovirus infection) status positive (H)     remote    Concussion without loss of consciousness     fell skiing -- no memory issues or HAs, also had rotator cuff issues from fall    Hyperplastic colonic polyp 2019    Infection due to 2019 novel coronavirus 10/13/2022    did ok with it    Other abnormal glucose 03/05/2013    prediabetes    Pure hypercholesterolemia 01/24/2012    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       Past Surgical History:   Procedure Laterality Date    CARPAL TUNNEL RELEASE RT/LT Right 2018    Dr Hinton    CARPAL TUNNEL RELEASE RT/LT Bilateral 01/2022    Keegan -- done at [facility]    COLONOSCOPY  08/07/2006    repeat in 10 yrs    COLONOSCOPY N/A 05/23/2019    due in 2024  Procedure: COLONOSCOPY WITH POLYPECTOMY;  Surgeon: David Groves MD;  Location: [facility]    EXCISE LESION BACK N/A 02/14/2022    Procedure: Excision of skin lesion on back;  Surgeon: David Groves MD;  Location: [facility]    EXCISE LESION NECK Left 03/07/2022    Procedure: Re-Excision of Lesion on Neck, left;  Surgeon: David Groves MD;  Location: [facility]    EXCISE LESION TRUNK N/A 02/14/2022    Procedure: Excision of skin lesions chest x2;  Surgeon: David Groves MD;  Location: [facility]    HERNIA REPAIR, INGUINAL RT/LT Right 01/01/2013    Right side    UPPER GI ENDOSCOPY  2001    found vocal cord nodules     VASECTOMY  01/01/2013       Family History   Problem Relation Age of Onset    C.A.D. Mother     Hypertension Mother     Breast Cancer Mother 80    Dementia Mother 90    Colon Polyps Mother     C.A.D. Father     Myocardial Infarction Father 56        myocardial infarction    Hypertension Sister     Hyperlipidemia Sister     Hypertension Sister     Hyperlipidemia Sister     Liver Disease Brother         end stage -- told that it wasn't from ETOH    Hypertension Brother     Hyperlipidemia Brother     Cerebrovascular Disease Maternal Grandmother     Other - See Comments Maternal Grandfather         accident    Heart Disease Paternal Grandmother     Heart Failure Paternal Grandfather        Social History     Tobacco Use    Smoking status: Never    Smokeless tobacco: Never   Substance Use Topics    Alcohol use: Yes     Alcohol/week: 7.0 standard drinks of alcohol     Types: 7 Standard drinks or equivalent per week     Comment: a few drinks a day; not everyday though       Patient Active Problem List   Diagnosis    GERD (gastroesophageal reflux disease)    CMV (cytomegalovirus) positive (H)    Hyperlipidemia LDL goal <130    Chronic seasonal allergic rhinitis due to pollen    Chronic bilateral low back pain without sciatica    Screening for AAA (abdominal aortic aneurysm)    Retinal vein occlusion of right eye (H28)    Branch retinal vein occlusion of left eye with macular edema (H28)    Cervical disc disorder at C6-C7 level with radiculopathy    Atypical pigmented lesion    Bilateral carpal tunnel syndrome    Weakness of both hands    Paresthesias    Hypovitaminosis D    Muscle cramps    Skin cancer of anterior chest    Dental abscess    Screening for prostate cancer    Chronic pain of both shoulders    Elevated glucose       Allergies   Allergen Reactions    Seasonal Allergies     Penicillins Rash       No current outpatient medications on file.       Vitals: /72   Pulse 79   Temp 98.3  F (36.8  C) (Tympanic)    "Resp 16   Ht 1.753 m (5' 9\")   Wt 88.5 kg (195 lb)   BMI 28.80 kg/m    BMI= Body mass index is 28.8 kg/m .    EXAM:  General: Vital signs reviewed, in no apparent distress  Eyes: Anicteric  HENT: Normocephalic, atraumatic, trachea midline   Respiratory: Breathing nonlabored, no wheezing, rhonchi or rales on bilateral anterior ausculation  Cardiovascular: Regular rate and rhythm, no murmurs, rubs or gallops  GI: Abdomen soft, nondistended, nontender, no organomegaly  Musculoskeletal: No gross deformities  Neurologic: Grossly nonfocal exam  Psychiatric: Normal mood, affect and insight  Integumentary: Warm and dry    ASSESSMENT:  Atul Coker is a 69 year old who presents for screening colonoscopy, last scope 2019 with 20mm hyperplastic polyp.  Significant pertinent co-morbidities include: None. ASA class 1.       PLAN:  Proceed with screening colonoscopy            Aryan Wagner MD      "

## 2024-11-11 ENCOUNTER — OFFICE VISIT (OUTPATIENT)
Dept: FAMILY MEDICINE | Facility: OTHER | Age: 69
End: 2024-11-11
Attending: FAMILY MEDICINE
Payer: MEDICARE

## 2024-11-11 ENCOUNTER — LAB (OUTPATIENT)
Dept: LAB | Facility: OTHER | Age: 69
End: 2024-11-11
Attending: FAMILY MEDICINE
Payer: MEDICARE

## 2024-11-11 VITALS
DIASTOLIC BLOOD PRESSURE: 78 MMHG | RESPIRATION RATE: 16 BRPM | WEIGHT: 198.2 LBS | HEIGHT: 69 IN | TEMPERATURE: 97.8 F | SYSTOLIC BLOOD PRESSURE: 136 MMHG | OXYGEN SATURATION: 97 % | BODY MASS INDEX: 29.36 KG/M2 | HEART RATE: 61 BPM

## 2024-11-11 DIAGNOSIS — L57.0 ACTINIC KERATOSIS: ICD-10-CM

## 2024-11-11 DIAGNOSIS — R73.09 ELEVATED GLUCOSE: ICD-10-CM

## 2024-11-11 DIAGNOSIS — E78.5 HYPERLIPIDEMIA LDL GOAL <130: ICD-10-CM

## 2024-11-11 DIAGNOSIS — E55.9 HYPOVITAMINOSIS D: ICD-10-CM

## 2024-11-11 DIAGNOSIS — R25.2 MUSCLE CRAMPS: ICD-10-CM

## 2024-11-11 DIAGNOSIS — Z00.00 ENCOUNTER FOR MEDICARE ANNUAL WELLNESS EXAM: Primary | ICD-10-CM

## 2024-11-11 DIAGNOSIS — H34.8110 CENTRAL RETINAL VEIN OCCLUSION WITH MACULAR EDEMA OF RIGHT EYE (H): ICD-10-CM

## 2024-11-11 DIAGNOSIS — K21.9 GASTROESOPHAGEAL REFLUX DISEASE WITHOUT ESOPHAGITIS: ICD-10-CM

## 2024-11-11 LAB
ALBUMIN SERPL BCG-MCNC: 4.3 G/DL (ref 3.5–5.2)
ALP SERPL-CCNC: 113 U/L (ref 40–150)
ALT SERPL W P-5'-P-CCNC: 42 U/L (ref 0–70)
ANION GAP SERPL CALCULATED.3IONS-SCNC: 13 MMOL/L (ref 7–15)
AST SERPL W P-5'-P-CCNC: 35 U/L (ref 0–45)
BILIRUB SERPL-MCNC: 0.5 MG/DL
BUN SERPL-MCNC: 12.9 MG/DL (ref 8–23)
CALCIUM SERPL-MCNC: 9.2 MG/DL (ref 8.8–10.4)
CHLORIDE SERPL-SCNC: 105 MMOL/L (ref 98–107)
CHOLEST SERPL-MCNC: 196 MG/DL
CREAT SERPL-MCNC: 1.14 MG/DL (ref 0.67–1.17)
EGFRCR SERPLBLD CKD-EPI 2021: 70 ML/MIN/1.73M2
EST. AVERAGE GLUCOSE BLD GHB EST-MCNC: 114 MG/DL
FASTING STATUS PATIENT QL REPORTED: YES
FASTING STATUS PATIENT QL REPORTED: YES
GLUCOSE SERPL-MCNC: 99 MG/DL (ref 70–99)
HBA1C MFR BLD: 5.6 %
HCO3 SERPL-SCNC: 22 MMOL/L (ref 22–29)
HDLC SERPL-MCNC: 80 MG/DL
LDLC SERPL CALC-MCNC: 101 MG/DL
NONHDLC SERPL-MCNC: 116 MG/DL
POTASSIUM SERPL-SCNC: 4.3 MMOL/L (ref 3.4–5.3)
PROT SERPL-MCNC: 7.2 G/DL (ref 6.4–8.3)
SODIUM SERPL-SCNC: 140 MMOL/L (ref 135–145)
TRIGL SERPL-MCNC: 76 MG/DL

## 2024-11-11 PROCEDURE — 17003 DESTRUCT PREMALG LES 2-14: CPT | Performed by: FAMILY MEDICINE

## 2024-11-11 PROCEDURE — 83036 HEMOGLOBIN GLYCOSYLATED A1C: CPT | Mod: ZL

## 2024-11-11 PROCEDURE — 82465 ASSAY BLD/SERUM CHOLESTEROL: CPT | Mod: ZL

## 2024-11-11 PROCEDURE — G0463 HOSPITAL OUTPT CLINIC VISIT: HCPCS

## 2024-11-11 PROCEDURE — 36415 COLL VENOUS BLD VENIPUNCTURE: CPT | Mod: ZL

## 2024-11-11 PROCEDURE — 84155 ASSAY OF PROTEIN SERUM: CPT | Mod: ZL

## 2024-11-11 PROCEDURE — 82435 ASSAY OF BLOOD CHLORIDE: CPT | Mod: ZL

## 2024-11-11 PROCEDURE — 17000 DESTRUCT PREMALG LESION: CPT | Performed by: FAMILY MEDICINE

## 2024-11-11 SDOH — HEALTH STABILITY: PHYSICAL HEALTH: ON AVERAGE, HOW MANY MINUTES DO YOU ENGAGE IN EXERCISE AT THIS LEVEL?: 30 MIN

## 2024-11-11 SDOH — HEALTH STABILITY: PHYSICAL HEALTH: ON AVERAGE, HOW MANY DAYS PER WEEK DO YOU ENGAGE IN MODERATE TO STRENUOUS EXERCISE (LIKE A BRISK WALK)?: 3 DAYS

## 2024-11-11 SDOH — HEALTH STABILITY: PHYSICAL HEALTH: ON AVERAGE, HOW MANY DAYS PER WEEK DO YOU ENGAGE IN MODERATE TO STRENUOUS EXERCISE (LIKE A BRISK WALK)?: 5 DAYS

## 2024-11-11 ASSESSMENT — PAIN SCALES - GENERAL: PAINLEVEL_OUTOF10: NO PAIN (0)

## 2024-11-11 ASSESSMENT — SOCIAL DETERMINANTS OF HEALTH (SDOH): HOW OFTEN DO YOU GET TOGETHER WITH FRIENDS OR RELATIVES?: MORE THAN THREE TIMES A WEEK

## 2024-11-11 ASSESSMENT — LIFESTYLE VARIABLES
HOW MANY STANDARD DRINKS CONTAINING ALCOHOL DO YOU HAVE ON A TYPICAL DAY: 1 OR 2
HOW OFTEN DO YOU HAVE A DRINK CONTAINING ALCOHOL: 4 OR MORE TIMES A WEEK

## 2024-11-11 NOTE — PROGRESS NOTES
"Preventive Care Visit  [facility]  Nydia Dexter MD, Family Medicine  Nov 11, 2024      Assessment & Plan     Encounter for Medicare annual wellness exam  Follow-up 1 year    Hyperlipidemia LDL goal <130  The 10-year ASCVD risk score (Sheryl VIVAR, et al., 2019) is: 13.7%    Values used to calculate the score:      Age: 69 years      Sex: Male      Is Non- : No      Diabetic: No      Tobacco smoker: No      Systolic Blood Pressure: 136 mmHg      Is BP treated: No      HDL Cholesterol: 80 mg/dL      Total Cholesterol: 196 mg/dL  Continue statin    Central retinal vein occlusion with macular edema of right eye (H)  He is following with retinal specialists every 2 mos    GERD (gastroesophageal reflux disease)  stable    Hypovitaminosis D  Not checked    Actinic keratosis  See note    Muscle cramps  Increase electrolytes    Patient has been advised of split billing requirements and indicates understanding: Yes    BMI  Estimated body mass index is 29.27 kg/m  as calculated from the following:    Height as of this encounter: 1.753 m (5' 9\").    Weight as of this encounter: 89.9 kg (198 lb 3.2 oz).   Weight management plan: Discussed healthy diet and exercise guidelines    Counseling  Appropriate preventive services were addressed with this patient via screening, questionnaire, or discussion as appropriate for fall prevention, nutrition, physical activity, Tobacco-use cessation, social engagement, weight loss and cognition.  Checklist reviewing preventive services available has been given to the patient.  Reviewed patient's diet, addressing concerns and/or questions.   He is at risk for lack of exercise and has been provided with information to increase physical activity for the benefit of his well-being.   The patient was instructed to see the dentist every 6 months.   The patient was provided with written information regarding signs of hearing loss.     Patient was agreeable to this " plan and had no further questions.  Patient Instructions   Patient Education  Preventive Care Advice   This is general advice given by our system to help you stay healthy. However, your care team may have specific advice just for you. Please talk to your care team about your preventive care needs.  Nutrition  Eat 5 or more servings of fruits and vegetables each day.  Try wheat bread, brown rice and whole grain pasta (instead of white bread, rice, and pasta).  Get enough calcium and vitamin D. Check the label on foods and aim for 100% of the RDA (recommended daily allowance).  Lifestyle  Exercise at least 150 minutes each week  (30 minutes a day, 5 days a week).  Do muscle strengthening activities 2 days a week. These help control your weight and prevent disease.  No smoking.  Wear sunscreen to prevent skin cancer.  Have a dental exam and cleaning every 6 months.  Yearly exams  See your health care team every year to talk about:  Any changes in your health.  Any medicines your care team has prescribed.  Preventive care, family planning, and ways to prevent chronic diseases.  Shots (vaccines)   HPV shots (up to age 26), if you've never had them before.  Hepatitis B shots (up to age 59), if you've never had them before.  COVID-19 shot: Get this shot when it's due.  Flu shot: Get a flu shot every year.  Tetanus shot: Get a tetanus shot every 10 years.  Pneumococcal, hepatitis A, and RSV shots: Ask your care team if you need these based on your risk.  Shingles shot (for age 50 and up)  General health tests  Diabetes screening:  Starting at age 35, Get screened for diabetes at least every 3 years.  If you are younger than age 35, ask your care team if you should be screened for diabetes.  Cholesterol test: At age 39, start having a cholesterol test every 5 years, or more often if advised.  Bone density scan (DEXA): At age 50, ask your care team if you should have this scan for osteoporosis (brittle bones).  Hepatitis C:  Get tested at least once in your life.  STIs (sexually transmitted infections)  Before age 24: Ask your care team if you should be screened for STIs.  After age 24: Get screened for STIs if you're at risk. You are at risk for STIs (including HIV) if:  You are sexually active with more than one person.  You don't use condoms every time.  You or a partner was diagnosed with a sexually transmitted infection.  If you are at risk for HIV, ask about PrEP medicine to prevent HIV.  Get tested for HIV at least once in your life, whether you are at risk for HIV or not.  Cancer screening tests  Cervical cancer screening: If you have a cervix, begin getting regular cervical cancer screening tests starting at age 21.  Breast cancer scan (mammogram): If you've ever had breasts, begin having regular mammograms starting at age 40. This is a scan to check for breast cancer.  Colon cancer screening: It is important to start screening for colon cancer at age 45.  Have a colonoscopy test every 10 years (or more often if you're at risk) Or, ask your provider about stool tests like a FIT test every year or Cologuard test every 3 years.  To learn more about your testing options, visit:   .  For help making a decision, visit:   https://bit.ly/dc76697.  Prostate cancer screening test: If you have a prostate, ask your care team if a prostate cancer screening test (PSA) at age 55 is right for you.  Lung cancer screening: If you are a current or former smoker ages 50 to 80, ask your care team if ongoing lung cancer screenings are right for you.  For informational purposes only. Not to replace the advice of your health care provider. Copyright   2023 [employer]. All rights reserved. Clinically reviewed by the [facility]itions [facility]. [employer] 426960 - REV 01/24.  Hearing Loss: Care Instructions  Overview     Hearing loss is a sudden or slow decrease in how well you hear. It can range from slight to profound.  Permanent hearing loss can occur with aging. It also can happen when you are exposed long-term to loud noise. Examples include listening to loud music, riding motorcycles, or being around other loud machines.  Hearing loss can affect your work and home life. It can make you feel lonely or depressed. You may feel that you have lost your independence. But hearing aids and other devices can help you hear better and feel connected to others.  Follow-up care is a key part of your treatment and safety. Be sure to make and go to all appointments, and call your doctor if you are having problems. It's also a good idea to know your test results and keep a list of the medicines you take.  How can you care for yourself at home?  Avoid loud noises whenever possible. This helps keep your hearing from getting worse.  Always wear hearing protection around loud noises.  Wear a hearing aid as directed.  A professional can help you pick a hearing aid that will work best for you.  You can also get hearing aids over the counter for mild to moderate hearing loss.  Have hearing tests as your doctor suggests. They can show whether your hearing has changed. Your hearing aid may need to be adjusted.  Use other devices as needed. These may include:  Telephone amplifiers and hearing aids that can connect to a television, stereo, radio, or microphone.  Devices that use lights or vibrations. These alert you to the doorbell, a ringing telephone, or a baby monitor.  Television closed-captioning. This shows the words at the bottom of the screen. Most new TVs can do this.  TTY (text telephone). This lets you type messages back and forth on the telephone instead of talking or listening. These devices are also called TDD. When messages are typed on the keyboard, they are sent over the phone line to a receiving TTY. The message is shown on a monitor.  Use text messaging, social media, and email if it is hard for you to communicate by telephone.  Try to  "learn a listening technique called speechreading. It is not lipreading. You pay attention to people's gestures, expressions, posture, and tone of voice. These clues can help you understand what a person is saying. Face the person you are talking to, and have them face you. Make sure the lighting is good. You need to see the other person's face clearly.  Think about counseling if you need help to adjust to your hearing loss.  When should you call for help?  Watch closely for changes in your health, and be sure to contact your doctor if:    You think your hearing is getting worse.     You have new symptoms, such as dizziness or nausea.   Where can you learn more?  Go to https://www.Grockit.net/patiented  Enter R798 in the search box to learn more about \"Hearing Loss: Care Instructions.\"  Current as of: September 27, 2023  Content Version: 14.2 2024 [facility] [employer].   Care instructions adapted under license by your healthcare professional. If you have questions about a medical condition or this instruction, always ask your healthcare professional. [employer] Incorporated disclaims any warranty or liability for your use of this information.    Substance Use Disorder: Care Instructions  Overview     You can improve your life and health by stopping your use of alcohol or drugs. When you don't drink or use drugs, you may feel and sleep better. You may get along better with your family, friends, and coworkers. There are medicines and programs that can help with substance use disorder.  How can you care for yourself at home?  Here are some ways to help you stay sober and prevent relapse.  If you have been given medicine to help keep you sober or reduce your cravings, be sure to take it exactly as prescribed.  Talk to your doctor about programs that can help you stop using drugs or drinking alcohol.  Do not keep alcohol or drugs in your home.  Plan ahead. Think about what you'll say if other people ask you to " drink or use drugs. Try not to spend time with people who drink or use drugs.  Use the time and money spent on drinking or drugs to do something that's important to you.  Preventing a relapse  Have a plan to deal with relapse. Learn to recognize changes in your thinking that lead you to drink or use drugs. Get help before you start to drink or use drugs again.  Try to stay away from situations, friends, or places that may lead you to drink or use drugs.  If you feel the need to drink alcohol or use drugs again, seek help right away. Call a trusted friend or family member. Some people get support from organizations such as Narcotics Anonymous or [employer] or from treatment facilities.  If you relapse, get help as soon as you can. Some people make a plan with another person that outlines what they want that person to do for them if they relapse. The plan usually includes how to handle the relapse and who to notify in case of relapse.  Don't give up. Remember that a relapse doesn't mean that you have failed. Use the experience to learn the triggers that lead you to drink or use drugs. Then quit again. Recovery is a lifelong process. Many people have several relapses before they are able to quit for good.  Follow-up care is a key part of your treatment and safety. Be sure to make and go to all appointments, and call your doctor if you are having problems. It's also a good idea to know your test results and keep a list of the medicines you take.  When should you call for help?   Call 911  anytime you think you may need emergency care. For example, call if you or someone else:    Has overdosed or has withdrawal signs. Be sure to tell the emergency workers that you are or someone else is using or trying to quit using drugs. Overdose or withdrawal signs may include:  Losing consciousness.  Seizure.  Seeing or hearing things that aren't there (hallucinations).     Is thinking or talking about suicide or harming others.  "  Where to get help 24 hours a day, 7 days a week   If you or someone you know talks about suicide, self-harm, a mental health crisis, a substance use crisis, or any other kind of emotional distress, get help right away. You can:    Call the Suicide and Crisis Lifeline at 988.     Call 6-926-393-TALK (1-621.333.6896).     Text HOME to 156194 to access the Crisis Text Line.   Consider saving these numbers in your phone.  Go to [employer] for more information or to chat online.  Call your doctor now or seek immediate medical care if:    You are having withdrawal symptoms. These may include nausea or vomiting, sweating, shakiness, and anxiety.   Watch closely for changes in your health, and be sure to contact your doctor if:    You have a relapse.     You need more help or support to stop.   Where can you learn more?  Go to https://www.Last Size.net/patiented  Enter H573 in the search box to learn more about \"Substance Use Disorder: Care Instructions.\"  Current as of: November 15, 2023  Content Version: 14.2    2024 [facility] [employer].   Care instructions adapted under license by your healthcare professional. If you have questions about a medical condition or this instruction, always ask your healthcare professional. Healthwise, Incorporated disclaims any warranty or liability for your use of this information.           No follow-ups on file.    José Miguel Perea is a 69 year old, presenting for the following:  Physical        11/11/2024     9:25 AM   Additional Questions   Roomed by Shar Sherman lpn   Accompanied by self           HPI  Hyperlipidemia Follow-Up    Are you regularly taking any medication or supplement to lower your cholesterol?   Yes- Rosuvastatin   Are you having muscle aches or other side effects that you think could be caused by your cholesterol lowering medication?  Unsure as they get side pain but not certain if it is from medication or changing how they play trumpet.    Health Care " Directive  Patient does not have a Health Care Directive: Discussed advance care planning with patient; however, patient declined at this time.      11/11/2024   General Health   How would you rate your overall physical health? Good   Feel stress (tense, anxious, or unable to sleep) To some extent      (!) STRESS CONCERN      11/11/2024   Nutrition   Diet: Other   If other, please elaborate: portion control            11/11/2024   Exercise   Days per week of moderate/strenous exercise 3 days   Average minutes spent exercising at this level 30 min            11/11/2024   Social Factors   Frequency of gathering with friends or relatives More than three times a week   Worry food won't last until get money to buy more No   Food not last or not have enough money for food? No   Do you have housing? (Housing is defined as stable permanent housing and does not include staying ouside in a car, in a tent, in an abandoned building, in an overnight shelter, or couch-surfing.) Yes   Are you worried about losing your housing? No   Lack of transportation? No   Unable to get utilities (heat,electricity)? No            11/11/2024   Fall Risk   Fallen 2 or more times in the past year? No    Trouble with walking or balance? No        Patient-reported          11/11/2024   Activities of Daily Living- Home Safety   Needs help with the following daily activites None of the above   Safety concerns in the home None of the above            11/11/2024   Dental   Dentist two times every year? (!) NO            11/11/2024   Hearing Screening   Hearing concerns? (!) I FEEL THAT PEOPLE ARE MUMBLING OR NOT SPEAKING CLEARLY.    (!) IT'S HARDER TO UNDERSTAND WOMEN'S VOICES THAN MEN'S VOICES.    (!) IT'S HARD TO FOLLOW A CONVERSATION IN A NOISY RESTAURANT OR CROWDED ROOM.    (!) TROUBLE UNDERSTANDING SOFT OR WHISPERED SPEECH.       Multiple values from one day are sorted in reverse-chronological order         11/11/2024   Driving Risk Screening    Patient/family members have concerns about driving No            11/11/2024   General Alertness/Fatigue Screening   Have you been more tired than usual lately? No            11/11/2024   Urinary Incontinence Screening   Bothered by leaking urine in past 6 months No            11/9/2024   TB Screening   Were you born outside of the [place]? No              Today's PHQ-2 Score:       3/11/2024     9:58 AM   PHQ-2 ( 1999 [employer])   Q1: Little interest or pleasure in doing things 0   Q2: Feeling down, depressed or hopeless 0   PHQ-2 Score 0         11/11/2024   Substance Use   Alcohol more than 3/day or more than 7/wk No   Do you have a current opioid prescription? No   How severe/bad is pain from 1 to 10? 2/10   Do you use any other substances recreationally? (!) CANNABIS PRODUCTS        Social History     Tobacco Use    Smoking status: Never    Smokeless tobacco: Never   Vaping Use    Vaping status: Never Used   Substance Use Topics    Alcohol use: Yes     Alcohol/week: 7.0 standard drinks of alcohol     Types: 7 Standard drinks or equivalent per week     Comment: a few drinks a day; not everyday though    Drug use: No           11/11/2024   AAA Screening   Family history of Abdominal Aortic Aneurysm (AAA)? Unsure      Last PSA:   PSA   Date Value Ref Range Status   11/16/2020 0.89 0 - 4 ug/L Final     Comment:     Assay Method:  Chemiluminescence using Siemens Vista analyzer     Prostate Specific Antigen Screen   Date Value Ref Range Status   11/08/2023 1.08 0.00 - 4.50 ng/mL Final   11/12/2021 1.13 0.00 - 4.00 ug/L Final     ASCVD Risk   The 10-year ASCVD risk score (Sheryl VIVAR, et al., 2019) is: 13.1%    Values used to calculate the score:      Age: 69 years      Sex: Male      Is Non- : No      Diabetic: No      Tobacco smoker: No      Systolic Blood Pressure: 132 mmHg      Is BP treated: No      HDL Cholesterol: 80 mg/dL      Total Cholesterol: 196 mg/dL    Fracture Risk Assessment  Tool  Link to Frax Calculator  Use the information below to complete the Frax calculator  : 1955  Sex: male  Weight (kg): 89.9 kg (actual weight)  Height (cm): 175.3 cm  Previous Fragility Fracture:  No  History of parent with fractured hip:  No  Current Smoking:  No  Patient has been on glucocorticoids for more than 3 months (5mg/day or more): No  Rheumatoid Arthritis on Problem List:  No  Secondary Osteoporosis on Problem List:  No  Consumes 3 or more units of alcohol per day: Yes, few times per week  Femoral Neck BMD (g/cm2)            Reviewed and updated as needed this visit by Provider                    Past Medical History:   Diagnosis Date    Allergic rhinitis, cause unspecified 2013    seasonal    Bilateral shoulder pain 2023    right greater than left, did PT and it helped    Branch retinal vein occlusion of both eyes (H)     bilateral, still getting injections with vitreoretinal group    CMV (cytomegalovirus infection) status positive (H)     remote    Concussion without loss of consciousness     fell skiing -- no memory issues or HAs, also had rotator cuff issues from fall    Hyperplastic colonic polyp     Infection due to 2019 novel coronavirus 10/13/2022    did ok with it    Insomnia     Other abnormal glucose 2013    prediabetes    Pure hypercholesterolemia 2012    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     Lab work is in process  Labs reviewed in EPIC  BP Readings from Last 3 Encounters:   24 132/80   24 130/88   23 124/80    Wt Readings from Last 3 Encounters:   24 89.9 kg (198 lb 3.2 oz)   24 88.5 kg (195 lb)   24 88.6 kg (195 lb 4.8 oz)                  Patient Active Problem List   Diagnosis    GERD (gastroesophageal reflux disease)    CMV (cytomegalovirus) positive (H)    Hyperlipidemia LDL goal <130    Chronic seasonal allergic rhinitis due to pollen     Chronic bilateral low back pain without sciatica    Screening for AAA (abdominal aortic aneurysm)    Retinal vein occlusion of right eye (H)    Branch retinal vein occlusion of left eye with macular edema (H)    Cervical disc disorder at C6-C7 level with radiculopathy    Atypical pigmented lesion    Bilateral carpal tunnel syndrome    Weakness of both hands    Paresthesias    Hypovitaminosis D    Muscle cramps    Skin cancer of anterior chest    Dental abscess    Screening for prostate cancer    Chronic pain of both shoulders    Elevated glucose     Past Surgical History:   Procedure Laterality Date    CARPAL TUNNEL RELEASE RT/LT Right 2018    Dr Hinton    CARPAL TUNNEL RELEASE RT/LT Bilateral 01/2022    Keegan -- [facility]    COLONOSCOPY  08/07/2006    repeat in 10 yrs    COLONOSCOPY N/A 05/23/2019    due in 2024  Procedure: COLONOSCOPY WITH POLYPECTOMY;  Surgeon: David Groves MD;  Location: [facility]    COLONOSCOPY N/A 09/20/2024    due 2034  Procedure: COLONOSCOPY;  Surgeon: Aryan Wagner MD;  Location: [facility]    EXCISE LESION BACK N/A 02/14/2022    Procedure: Excision of skin lesion on back;  Surgeon: David Groves MD;  Location: [facility]    EXCISE LESION NECK Left 03/07/2022    Procedure: Re-Excision of Lesion on Neck, left;  Surgeon: David Groves MD;  Location: [facility]    EXCISE LESION TRUNK N/A 02/14/2022    Procedure: Excision of skin lesions chest x2;  Surgeon: David Groves MD;  Location: [facility]    HERNIA REPAIR, INGUINAL RT/LT Right 01/01/2013    Right side    UPPER GI ENDOSCOPY  2001    found vocal cord nodules    VASECTOMY  01/01/2013       Social History     Tobacco Use    Smoking status: Never    Smokeless tobacco: Never   Substance Use Topics    Alcohol use: Yes     Alcohol/week: 7.0 standard drinks of alcohol     Types: 7 Standard drinks or equivalent per week     Comment: 2-3 per day, not everyday though     Family History   Problem  Relation Age of Onset    C.A.D. Mother     Hypertension Mother     Breast Cancer Mother 80    Dementia Mother 90    Colon Polyps Mother     C.A.D. Father     Myocardial Infarction Father 56        myocardial infarction    Hypertension Sister     Hyperlipidemia Sister     Hypertension Sister     Hyperlipidemia Sister     Liver Disease Brother         end stage -- told that it wasn't from ETOH    Hypertension Brother     Hyperlipidemia Brother     Cerebrovascular Disease Maternal Grandmother     Other - See Comments Maternal Grandfather         accident    Heart Disease Paternal Grandmother     Heart Failure Paternal Grandfather          Current Outpatient Medications   Medication Sig Dispense Refill    ASPIRIN PO Take 81 mg by mouth daily      azelastine (ASTELIN) 0.1 % nasal spray use two sprays in each nostril twice daily 30 mL 11    BEVACIZUMAB, AVASTIN, INJECTION 2.5MG 1.25 mg by Intravitreal route 1.25mg right eye monthly, left eye every other month      Coenzyme Q10 (COQ10) 200 MG CAPS Take 1 capsule by mouth daily      diclofenac (VOLTAREN) 1 % topical gel Apply 2 g topically 2 times daily as needed for moderate pain 100 g 4    Famotidine (PEPCID AC MAXIMUM STRENGTH PO) Take 20 mg by mouth daily      fexofenadine (ALLEGRA) 180 MG tablet Take 1 tablet by mouth daily.      multivitamin w/minerals (THERA-VIT-M) tablet Take 1 tablet by mouth daily      rosuvastatin (CRESTOR) 10 MG tablet Take 1 tablet (10 mg) by mouth daily 90 tablet 1    vitamin D3 (CHOLECALCIFEROL) 1.25 MG (93530 UT) capsule Take 1 capsule (50,000 Units) by mouth every 7 days 4 capsule 2     Allergies   Allergen Reactions    Seasonal Allergies     Penicillins Rash     Recent Labs   Lab Test 11/11/24  0851 02/14/24  0923 11/08/23  0833 11/02/22  1217 01/05/22  1054 11/12/21  1030 11/16/20  0916 02/07/20  1540   A1C 5.6 5.7*  --   --   --   --   --   --    * 87 100 88   < > 121* 110*  --    HDL 80 72 76 69   < > 70 73  --    TRIG 76 117  "168* 133   < > 136 178*  --    ALT 42  --  35 37   < > 43 29  --    CR 1.14  --  1.13 1.02   < > 1.13 1.16 1.01   GFRESTIMATED 70  --  71 81   < > 68 66 78   GFRESTBLACK  --   --   --   --   --   --  76 >90   POTASSIUM 4.3  --  4.1 4.7   < > 4.2 4.4 4.2   TSH  --   --   --   --   --  1.18 1.40  --     < > = values in this interval not displayed.      Current providers sharing in care for this patient include:  Patient Care Team:  Nydia Dexter MD as PCP - General (Family Medicine)  Nydia Dexter MD as Assigned PCP    The following health maintenance items are reviewed in Epic and correct as of today:  Health Maintenance   Topic Date Due    RSV VACCINE (1 - Risk 60-74 years 1-dose series) Never done    INFLUENZA VACCINE (1) 09/01/2024    COVID-19 Vaccine (7 - 2024-25 season) 09/01/2024    MEDICARE ANNUAL WELLNESS VISIT  11/11/2025    LIPID  11/11/2025    FALL RISK ASSESSMENT  11/11/2025    GLUCOSE  11/11/2027    ADVANCE CARE PLANNING  11/08/2028    COLORECTAL CANCER SCREENING  09/20/2029    DTAP/TDAP/TD IMMUNIZATION (3 - Td or Tdap) 01/05/2032    PHQ-2 (once per calendar year)  Completed    Pneumococcal Vaccine: 65+ Years  Completed    ZOSTER IMMUNIZATION  Completed    HEPATITIS C SCREENING  Addressed    HPV IMMUNIZATION  Aged Out    MENINGITIS IMMUNIZATION  Aged Out    RSV MONOCLONAL ANTIBODY  Aged Out         Review of Systems  Constitutional, HEENT, cardiovascular, pulmonary, GI, , musculoskeletal, neuro, skin, endocrine and psych systems are negative, except as otherwise noted.     Objective    Exam  Resp 16   Ht 1.753 m (5' 9\")   Wt 89.9 kg (198 lb 3.2 oz)   BMI 29.27 kg/m     Estimated body mass index is 29.27 kg/m  as calculated from the following:    Height as of this encounter: 1.753 m (5' 9\").    Weight as of this encounter: 89.9 kg (198 lb 3.2 oz).    Physical Exam  GENERAL: alert and no distress  EYES: Eyes grossly normal to inspection, PERRL and conjunctivae and sclerae normal  HENT: ear " canals and TM's normal, nose and mouth without ulcers or lesions  NECK: no adenopathy, no asymmetry, masses, or scars  RESP: lungs clear to auscultation - no rales, rhonchi or wheezes  CV: regular rate and rhythm, normal S1 S2, no S3 or S4, no murmur, click or rub, no peripheral edema  ABDOMEN: soft, nontender, no hepatosplenomegaly, no masses and bowel sounds normal  MS: no gross musculoskeletal defects noted, no edema  SKIN: no suspicious lesions or rashes  NEURO: Normal strength and tone, mentation intact and speech normal  PSYCH: mentation appears normal, affect normal/bright        11/11/2024   Mini Cog   Clock Draw Score 2 Normal   3 Item Recall 3 objects recalled   Mini Cog Total Score 5                 Signed Electronically by: Nydia Dexter MD      Procedure: Cryotherapy  Diagnosis: actinic keratoses  Location: left temple  Specimen number: 3  Lesion size: approx 6mm, 4mm and 8mm  Total size: 18mm  Discussion of treatment options, all of patient's questions answered. After informed consent, patient elects to proceed with procedure. Cryotherapy with LN2 with 3 mm margins and 7-9 second thaw cycle. Cryotherapy performed in triplicate.  Wound care instructions given. Follow up with any wound problems, poor healing, or signs of infection.

## 2024-11-11 NOTE — PATIENT INSTRUCTIONS
For electrolytes -- ultima is a good brand   Or   1/4-1/2 tsp himalayan salt/or other with minerals in it  1/4 cream of tartar    Patient Education   Actinic Keratosis: Care Instructions  Overview  Actinic keratosis is a skin growth caused by sun damage. It can turn into skin cancer, but this isn't common. Actinic keratoses, also called solar keratoses, are small red, brown, or skin-colored scaly patches. They are most common on the scalp, face, neck, hands, and forearms.  Your doctor can remove these growths by freezing or scraping them off or by putting medicines on them.  Follow-up care is a key part of your treatment and safety. Be sure to make and go to all appointments, and call your doctor if you are having problems. It's also a good idea to know your test results and keep a list of the medicines you take.  How can you care for yourself at home?  If your doctor told you how to care for the treated area, follow your doctor's instructions. If you did not get instructions, follow this general advice:  Wash around the area with clean water 2 times a day. Don't use hydrogen peroxide or alcohol. They can slow healing.  You may cover the area with a thin layer of petroleum jelly, such as Vaseline, and a nonstick bandage.  Apply more petroleum jelly, and replace the bandage as needed.  Avoid using an antibiotic ointment unless your doctor recommends it.  How can you help prevent it?  To help prevent getting another actinic keratosis:  Always wear sunscreen on exposed skin. Make sure to use a broad-spectrum sunscreen that has a sun protection factor (SPF) of 30 or higher. Use it every day, even when it is cloudy.  Wear long sleeves, a hat, and pants if you are going to be outdoors for a long time.  Avoid the sun between 10 a.m. and 4 p.m., the peak time for UV rays.  Do not use tanning booths or sunlamps.  When should you call for help?  Watch closely for changes in your health, and be sure to contact your doctor  "if:    You have symptoms of infection, such as:  Increased pain, swelling, warmth, or redness.  Red streaks leading from the area.  Pus draining from the area.  A fever.     You see a change in your skin, such as a spot, growth, or mole that:  Grows bigger. This may happen slowly.  Changes color.  Changes shape.  Starts to bleed easily.     You have a wound that does not heal.   Where can you learn more?  Go to https://[email or web address]/patiented  Enter L364 in the search box to learn more about \"Actinic Keratosis: Care Instructions.\"  Current as of: November 16, 2023  Content Version: 14.2 2024 [employer].   Care instructions adapted under license by your healthcare professional. If you have questions about a medical condition or this instruction, always ask your healthcare professional. [employer] Incorporated disclaims any warranty or liability for your use of this information.     Patient Education   Preventive Care Advice   This is general advice given by our system to help you stay healthy. However, your care team may have specific advice just for you. Please talk to your care team about your preventive care needs.  Nutrition  Eat 5 or more servings of fruits and vegetables each day.  Try wheat bread, brown rice and whole grain pasta (instead of white bread, rice, and pasta).  Get enough calcium and vitamin D. Check the label on foods and aim for 100% of the RDA (recommended daily allowance).  Lifestyle  Exercise at least 150 minutes each week  (30 minutes a day, 5 days a week).  Do muscle strengthening activities 2 days a week. These help control your weight and prevent disease.  No smoking.  Wear sunscreen to prevent skin cancer.  Have a dental exam and cleaning every 6 months.  Yearly exams  See your health care team every year to talk about:  Any changes in your health.  Any medicines your care team has prescribed.  Preventive care, family planning, and ways to prevent chronic " diseases.  Shots (vaccines)   HPV shots (up to age 26), if you've never had them before.  Hepatitis B shots (up to age 59), if you've never had them before.  COVID-19 shot: Get this shot when it's due.  Flu shot: Get a flu shot every year.  Tetanus shot: Get a tetanus shot every 10 years.  Pneumococcal, hepatitis A, and RSV shots: Ask your care team if you need these based on your risk.  Shingles shot (for age 50 and up)  General health tests  Diabetes screening:  Starting at age 35, Get screened for diabetes at least every 3 years.  If you are younger than age 35, ask your care team if you should be screened for diabetes.  Cholesterol test: At age 39, start having a cholesterol test every 5 years, or more often if advised.  Bone density scan (DEXA): At age 50, ask your care team if you should have this scan for osteoporosis (brittle bones).  Hepatitis C: Get tested at least once in your life.  STIs (sexually transmitted infections)  Before age 24: Ask your care team if you should be screened for STIs.  After age 24: Get screened for STIs if you're at risk. You are at risk for STIs (including HIV) if:  You are sexually active with more than one person.  You don't use condoms every time.  You or a partner was diagnosed with a sexually transmitted infection.  If you are at risk for HIV, ask about PrEP medicine to prevent HIV.  Get tested for HIV at least once in your life, whether you are at risk for HIV or not.  Cancer screening tests  Cervical cancer screening: If you have a cervix, begin getting regular cervical cancer screening tests starting at age 21.  Breast cancer scan (mammogram): If you've ever had breasts, begin having regular mammograms starting at age 40. This is a scan to check for breast cancer.  Colon cancer screening: It is important to start screening for colon cancer at age 45.  Have a colonoscopy test every 10 years (or more often if you're at risk) Or, ask your provider about stool tests like a  FIT test every year or Cologuard test every 3 years.  To learn more about your testing options, visit:   .  For help making a decision, visit:   https://bit.ly/sc27343.  Prostate cancer screening test: If you have a prostate, ask your care team if a prostate cancer screening test (PSA) at age 55 is right for you.  Lung cancer screening: If you are a current or former smoker ages 50 to 80, ask your care team if ongoing lung cancer screenings are right for you.  For informational purposes only. Not to replace the advice of your health care provider. Copyright   2023 [employer]. All rights reserved. Clinically reviewed by the  [facility]. [employer] [id number] - REV 01/24.  Hearing Loss: Care Instructions  Overview     Hearing loss is a sudden or slow decrease in how well you hear. It can range from slight to profound. Permanent hearing loss can occur with aging. It also can happen when you are exposed long-term to loud noise. Examples include listening to loud music, riding motorcycles, or being around other loud machines.  Hearing loss can affect your work and home life. It can make you feel lonely or depressed. You may feel that you have lost your independence. But hearing aids and other devices can help you hear better and feel connected to others.  Follow-up care is a key part of your treatment and safety. Be sure to make and go to all appointments, and call your doctor if you are having problems. It's also a good idea to know your test results and keep a list of the medicines you take.  How can you care for yourself at home?  Avoid loud noises whenever possible. This helps keep your hearing from getting worse.  Always wear hearing protection around loud noises.  Wear a hearing aid as directed.  A professional can help you pick a hearing aid that will work best for you.  You can also get hearing aids over the counter for mild to moderate hearing loss.  Have hearing tests as your  "doctor suggests. They can show whether your hearing has changed. Your hearing aid may need to be adjusted.  Use other devices as needed. These may include:  Telephone amplifiers and hearing aids that can connect to a television, stereo, radio, or microphone.  Devices that use lights or vibrations. These alert you to the doorbell, a ringing telephone, or a baby monitor.  Television closed-captioning. This shows the words at the bottom of the screen. Most new TVs can do this.  TTY (text telephone). This lets you type messages back and forth on the telephone instead of talking or listening. These devices are also called TDD. When messages are typed on the keyboard, they are sent over the phone line to a receiving TTY. The message is shown on a monitor.  Use text messaging, social media, and email if it is hard for you to communicate by telephone.  Try to learn a listening technique called speechreading. It is not lipreading. You pay attention to people's gestures, expressions, posture, and tone of voice. These clues can help you understand what a person is saying. Face the person you are talking to, and have them face you. Make sure the lighting is good. You need to see the other person's face clearly.  Think about counseling if you need help to adjust to your hearing loss.  When should you call for help?  Watch closely for changes in your health, and be sure to contact your doctor if:    You think your hearing is getting worse.     You have new symptoms, such as dizziness or nausea.   Where can you learn more?  Go to https://www.[employer].net/patiented  Enter R798 in the search box to learn more about \"Hearing Loss: Care Instructions.\"  Current as of: September 27, 2023  Content Version: 14.2 2024 [employer].   Care instructions adapted under license by your healthcare professional. If you have questions about a medical condition or this instruction, always ask your healthcare professional. [employer] " Incorporated disclaims any warranty or liability for your use of this information.    Substance Use Disorder: Care Instructions  Overview     You can improve your life and health by stopping your use of alcohol or drugs. When you don't drink or use drugs, you may feel and sleep better. You may get along better with your family, friends, and coworkers. There are medicines and programs that can help with substance use disorder.  How can you care for yourself at home?  Here are some ways to help you stay sober and prevent relapse.  If you have been given medicine to help keep you sober or reduce your cravings, be sure to take it exactly as prescribed.  Talk to your doctor about programs that can help you stop using drugs or drinking alcohol.  Do not keep alcohol or drugs in your home.  Plan ahead. Think about what you'll say if other people ask you to drink or use drugs. Try not to spend time with people who drink or use drugs.  Use the time and money spent on drinking or drugs to do something that's important to you.  Preventing a relapse  Have a plan to deal with relapse. Learn to recognize changes in your thinking that lead you to drink or use drugs. Get help before you start to drink or use drugs again.  Try to stay away from situations, friends, or places that may lead you to drink or use drugs.  If you feel the need to drink alcohol or use drugs again, seek help right away. Call a trusted friend or family member. Some people get support from organizations such as Narcotics Anonymous or [employer] or from treatment facilities.  If you relapse, get help as soon as you can. Some people make a plan with another person that outlines what they want that person to do for them if they relapse. The plan usually includes how to handle the relapse and who to notify in case of relapse.  Don't give up. Remember that a relapse doesn't mean that you have failed. Use the experience to learn the triggers that lead you to  "drink or use drugs. Then quit again. Recovery is a lifelong process. Many people have several relapses before they are able to quit for good.  Follow-up care is a key part of your treatment and safety. Be sure to make and go to all appointments, and call your doctor if you are having problems. It's also a good idea to know your test results and keep a list of the medicines you take.  When should you call for help?   Call 911  anytime you think you may need emergency care. For example, call if you or someone else:    Has overdosed or has withdrawal signs. Be sure to tell the emergency workers that you are or someone else is using or trying to quit using drugs. Overdose or withdrawal signs may include:  Losing consciousness.  Seizure.  Seeing or hearing things that aren't there (hallucinations).     Is thinking or talking about suicide or harming others.   Where to get help 24 hours a day, 7 days a week   If you or someone you know talks about suicide, self-harm, a mental health crisis, a substance use crisis, or any other kind of emotional distress, get help right away. You can:    Call the Suicide and Crisis Lifeline at 988.     Call 7-127-779-TALK (1-771.354.4563).     Text HOME to 068369 to access the Crisis Text Line.   Consider saving these numbers in your phone.  Go to [employer] for more information or to chat online.  Call your doctor now or seek immediate medical care if:    You are having withdrawal symptoms. These may include nausea or vomiting, sweating, shakiness, and anxiety.   Watch closely for changes in your health, and be sure to contact your doctor if:    You have a relapse.     You need more help or support to stop.   Where can you learn more?  Go to https://www.[employer].net/patiented  Enter H573 in the search box to learn more about \"Substance Use Disorder: Care Instructions.\"  Current as of: November 15, 2023  Content Version: 14.2 2024 [facility]   Care instructions " adapted under license by your healthcare professional. If you have questions about a medical condition or this instruction, always ask your healthcare professional. Healthwise, Incorporated disclaims any warranty or liability for your use of this information.

## 2024-12-17 DIAGNOSIS — J30.1 CHRONIC SEASONAL ALLERGIC RHINITIS DUE TO POLLEN: ICD-10-CM

## 2024-12-17 NOTE — TELEPHONE ENCOUNTER
Azelastine (Astelin) 0.1% nasal spray      Last Written Prescription Date:  11/20/2023  Last Fill Quantity: 30,   # refills: 11  Last Office Visit: 11/11/2024  Future Office visit:       Routing refill request to provider for review/approval because:

## 2024-12-19 RX ORDER — AZELASTINE 1 MG/ML
SPRAY, METERED NASAL
Qty: 30 ML | Refills: 11 | Status: SHIPPED | OUTPATIENT
Start: 2024-12-19

## 2025-02-10 DIAGNOSIS — E78.5 HYPERLIPIDEMIA LDL GOAL <130: ICD-10-CM

## 2025-02-10 NOTE — TELEPHONE ENCOUNTER
Crestor      Last Written Prescription Date:  8/19/24  Last Fill Quantity: 90,   # refills: 1  Last Office Visit: 11/11/24  Future Office visit:       Routing refill request to provider for review/approval because:

## 2025-02-11 RX ORDER — ROSUVASTATIN CALCIUM 10 MG/1
10 TABLET, COATED ORAL DAILY
Qty: 90 TABLET | Refills: 2 | Status: SHIPPED | OUTPATIENT
Start: 2025-02-11

## 2025-02-27 ENCOUNTER — OFFICE VISIT (OUTPATIENT)
Dept: FAMILY MEDICINE | Facility: OTHER | Age: 70
End: 2025-02-27
Attending: FAMILY MEDICINE
Payer: MEDICARE

## 2025-02-27 VITALS
SYSTOLIC BLOOD PRESSURE: 144 MMHG | TEMPERATURE: 97.8 F | OXYGEN SATURATION: 96 % | DIASTOLIC BLOOD PRESSURE: 96 MMHG | HEART RATE: 65 BPM

## 2025-02-27 DIAGNOSIS — L30.4 INTERTRIGO: Primary | ICD-10-CM

## 2025-02-27 PROCEDURE — G0463 HOSPITAL OUTPT CLINIC VISIT: HCPCS

## 2025-02-27 RX ORDER — KETOCONAZOLE 20 MG/G
CREAM TOPICAL 2 TIMES DAILY
Qty: 60 G | Refills: 3 | Status: SHIPPED | OUTPATIENT
Start: 2025-02-27

## 2025-02-27 ASSESSMENT — PAIN SCALES - GENERAL: PAINLEVEL_OUTOF10: NO PAIN (0)

## 2025-02-27 NOTE — PROGRESS NOTES
Assessment & Plan     Intertrigo  Given the location and appearance this seems to be most consistent with intertrigo.  At this time we will use ketoconazole 1-2 times daily on the affected areas.  Discussed other recommendations including keeping the area clean, barrier creams.  He will contact me if this rash is not improving and certainly if it changes or worsens.  - ketoconazole (NIZORAL) 2 % external cream; Apply topically 2 times daily.    No follow-ups on file.    José Miguel Perea is a 70 year old, presenting for the following health issues:  No chief complaint on file.    History of Present Illness       Reason for visit:  Red   He is taking medications regularly.     He comes in today with a rash underneath his armpits that have been present for a couple of weeks.  He first felt that it may have started after he was sleeping in a T-shirt but has been sleeping without a tissue for couple weeks without any improvement.  He also notes that it seemed to have started after he was cross-country skiing.  No change in deodorants, detergents, soaps etc.  He has tried over-the-counter hydrocortisone cream which has helped with the itching and the scaliness about the redness has persisted.    Rash  Onset/Duration: one month  Description  Location: bilateral axilla   Character:   Itching: mild  Intensity:  mild  Progression of Symptoms:  same  Accompanying signs and symptoms:   Fever: No  Body aches or joint pain: No  Sore throat symptoms: No  Recent cold symptoms: No  History:           Previous episodes of similar rash: None  New exposures:  None  Recent travel: [place] couple before rash showed up  Exposure to similar rash: No  Precipitating or alleviating factors: hydrocortisone decreases the itching, not the redness  Therapies tried and outcome: hydrocortisone cream -  helps with itch        Objective    There were no vitals taken for this visit.  There is no height or weight on file to calculate BMI.  Physical  Exam   GENERAL: alert and no distress  SKIN: 6-10 erythematous patches underneath both armpits all less than 2 cm in greatest dimension.  No vesicles or scaling is noted.  1 in his left axilla surrounds a skin tag.  These are not raised.  No active drainage.          Signed Electronically by: Simon Green MD

## 2025-04-07 DIAGNOSIS — G89.29 CHRONIC PAIN OF BOTH SHOULDERS: ICD-10-CM

## 2025-04-07 DIAGNOSIS — M25.511 CHRONIC PAIN OF BOTH SHOULDERS: ICD-10-CM

## 2025-04-07 DIAGNOSIS — M25.512 CHRONIC PAIN OF BOTH SHOULDERS: ICD-10-CM

## 2025-04-07 NOTE — TELEPHONE ENCOUNTER
Voltaren  Last Written Prescription Date: 11/8/23  Last Fill Quantity: 100 g # of Refills: 4  Last Office Visit: 11/11/24

## 2025-04-16 ENCOUNTER — MYC MEDICAL ADVICE (OUTPATIENT)
Dept: FAMILY MEDICINE | Facility: OTHER | Age: 70
End: 2025-04-16

## 2025-04-16 DIAGNOSIS — B35.4 TINEA CORPORIS: Primary | ICD-10-CM

## 2025-04-16 RX ORDER — TERBINAFINE HYDROCHLORIDE 250 MG/1
250 TABLET ORAL DAILY
Qty: 28 TABLET | Refills: 0 | Status: SHIPPED | OUTPATIENT
Start: 2025-04-16 | End: 2025-05-14

## 2025-04-16 NOTE — TELEPHONE ENCOUNTER
Yes the patient would like the medication but is wondering if her needs to still use the cream?  Luis Eduardo Bell in [facility]

## 2025-04-16 NOTE — TELEPHONE ENCOUNTER
Please see My chart message      Patient was seen on 2/27/25 with Peter.  Do you want to see him tomorrow 4/17/25 in [place]?    ntertrigo  Dx Derm Problem   Reason for Visit       Assessment & Plan  Intertrigo  Given the location and appearance this seems to be most consistent with intertrigo.  At this time we will use ketoconazole 1-2 times daily on the affected areas.  Discussed other recommendations including keeping the area clean, barrier creams.  He will contact me if this rash is not improving and certainly if it changes or worsens.  - ketoconazole (NIZORAL) 2 % external cream; Apply topically 2 times daily.

## 2025-04-16 NOTE — TELEPHONE ENCOUNTER
I don't have any openings in [place] tomorrow.  Could consider an oral option to see if that is more effective.  If he is good with that, I can send in for him.  Would need to confirm pharmacy and follow up if that also is not helping.

## 2025-05-06 ENCOUNTER — MYC MEDICAL ADVICE (OUTPATIENT)
Dept: FAMILY MEDICINE | Facility: OTHER | Age: 70
End: 2025-05-06

## 2025-05-06 NOTE — TELEPHONE ENCOUNTER
Per mychart thread from 4/16/25, Dr. Green wanted patient to follow up if rash was not improving with oral medications.   Called patient and scheduled him to see Dr. Green on 5/8/25 in [place].     Roberto DUPONT RN, Care Coordinator  [facility]

## 2025-05-08 ENCOUNTER — OFFICE VISIT (OUTPATIENT)
Dept: FAMILY MEDICINE | Facility: OTHER | Age: 70
End: 2025-05-08
Attending: FAMILY MEDICINE
Payer: MEDICARE

## 2025-05-08 VITALS
BODY MASS INDEX: 28.65 KG/M2 | OXYGEN SATURATION: 98 % | TEMPERATURE: 97.9 F | WEIGHT: 194 LBS | SYSTOLIC BLOOD PRESSURE: 122 MMHG | RESPIRATION RATE: 16 BRPM | HEART RATE: 78 BPM | DIASTOLIC BLOOD PRESSURE: 66 MMHG

## 2025-05-08 DIAGNOSIS — L30.9 DERMATITIS: Primary | ICD-10-CM

## 2025-05-08 PROBLEM — R20.2 PARESTHESIAS: Status: RESOLVED | Noted: 2022-01-05 | Resolved: 2025-05-08

## 2025-05-08 PROBLEM — L81.9 ATYPICAL PIGMENTED LESION: Status: RESOLVED | Noted: 2022-01-05 | Resolved: 2025-05-08

## 2025-05-08 PROBLEM — Z13.6 SCREENING FOR AAA (ABDOMINAL AORTIC ANEURYSM): Status: RESOLVED | Noted: 2019-06-12 | Resolved: 2025-05-08

## 2025-05-08 PROBLEM — R73.09 ELEVATED GLUCOSE: Status: RESOLVED | Noted: 2023-11-08 | Resolved: 2025-05-08

## 2025-05-08 PROBLEM — R25.2 MUSCLE CRAMPS: Status: RESOLVED | Noted: 2022-01-05 | Resolved: 2025-05-08

## 2025-05-08 PROBLEM — R29.898 WEAKNESS OF BOTH HANDS: Status: RESOLVED | Noted: 2022-01-05 | Resolved: 2025-05-08

## 2025-05-08 PROBLEM — Z12.5 SCREENING FOR PROSTATE CANCER: Status: RESOLVED | Noted: 2023-11-08 | Resolved: 2025-05-08

## 2025-05-08 PROCEDURE — G0463 HOSPITAL OUTPT CLINIC VISIT: HCPCS

## 2025-05-08 RX ORDER — FLUOCINONIDE 0.5 MG/G
CREAM TOPICAL 2 TIMES DAILY
Qty: 60 G | Refills: 3 | Status: SHIPPED | OUTPATIENT
Start: 2025-05-08

## 2025-05-08 ASSESSMENT — PAIN SCALES - GENERAL: PAINLEVEL_OUTOF10: NO PAIN (0)

## 2025-05-08 NOTE — PROGRESS NOTES
"  Assessment & Plan     Dermatitis  Discussed differential and given previous treatments doubt that this is fungal.  Does not appear classic bacterial so we will treat for psoriatic or dermatitis lesions with fluocinonide cream twice daily.  Discussed options of skin biopsy which we will hold off on for now but if he is having no symptom improvement would consider that in the future.  Discussed methods to keep this area as dry as possible.  Try to avoid deodorant until this resolves.  - fluocinonide (LIDEX) 0.05 % external cream; Apply topically 2 times daily.          BMI  Estimated body mass index is 28.65 kg/m  as calculated from the following:    Height as of 11/11/24: 1.753 m (5' 9\").    Weight as of this encounter: 88 kg (194 lb).   Weight management plan: Discussed healthy diet and exercise guidelines    José Miguel Perea is a 70 year old, presenting for the following health issues:  Derm Problem (Follow up rash)        5/8/2025     9:22 AM   Additional Questions   Roomed by JJ Brandt CMA   Accompanied by Self         5/8/2025     9:22 AM   Patient Reported Additional Medications   Patient reports taking the following new medications None     HPI    Patient was seen in February for a erythematous rash in his armpits bilaterally.  At that time we treated it with ketoconazole cream twice daily.  He then contacted me and stated that it had not yet resolved and so we switched him to oral terbinafine for 4 weeks.  He comes in today for follow-up.    He thinks that he has had maybe slight improvement but overall no significant change with the 2 treatments.  Certainly no worsening.  No new skin changes anywhere else that he is aware of.        Objective    /66   Pulse 78   Temp 97.9  F (36.6  C) (Tympanic)   Resp 16   Wt 88 kg (194 lb)   SpO2 98%   BMI 28.65 kg/m    Body mass index is 28.65 kg/m .  Physical Exam   GENERAL: alert and no distress  SKIN: He has 5-6 well-demarcated, erythematous flat " lesions in his armpits bilaterally.  No vesicles.  Potentially some scaling is noted however this is difficult given the location being moist.  No active drainage.  No excoriations.  Checked other skin folds including elbows, neck, trunk without any changes.          Signed Electronically by: Simon Green MD

## 2025-05-14 ENCOUNTER — OFFICE VISIT (OUTPATIENT)
Dept: FAMILY MEDICINE | Facility: OTHER | Age: 70
End: 2025-05-14
Attending: FAMILY MEDICINE
Payer: MEDICARE

## 2025-05-14 VITALS
HEART RATE: 75 BPM | SYSTOLIC BLOOD PRESSURE: 138 MMHG | HEIGHT: 69 IN | BODY MASS INDEX: 28.76 KG/M2 | TEMPERATURE: 97.9 F | OXYGEN SATURATION: 97 % | DIASTOLIC BLOOD PRESSURE: 82 MMHG | WEIGHT: 194.2 LBS | RESPIRATION RATE: 18 BRPM

## 2025-05-14 DIAGNOSIS — L40.9 PSORIASIS: ICD-10-CM

## 2025-05-14 DIAGNOSIS — L57.0 ACTINIC KERATOSIS: Primary | ICD-10-CM

## 2025-05-14 DIAGNOSIS — E88.819 INSULIN RESISTANCE: ICD-10-CM

## 2025-05-14 PROCEDURE — 17003 DESTRUCT PREMALG LES 2-14: CPT | Performed by: FAMILY MEDICINE

## 2025-05-14 PROCEDURE — G0463 HOSPITAL OUTPT CLINIC VISIT: HCPCS

## 2025-05-14 PROCEDURE — 17000 DESTRUCT PREMALG LESION: CPT | Performed by: FAMILY MEDICINE

## 2025-05-14 ASSESSMENT — PAIN SCALES - GENERAL: PAINLEVEL_OUTOF10: NO PAIN (0)

## 2025-05-14 NOTE — PATIENT INSTRUCTIONS
Eat less processed foods  Try intermittent fasting      The Glucose Revolution  by Lulú Hernandez  (glucose goddess)  The Obesity Code by Dr Omer Agee  (web -- you tube)

## (undated) DEVICE — SUCTION MANIFOLD NEPTUNE 2 SYS 1 PORT 702-025-000

## (undated) DEVICE — IRRIGATION-NACL 1000ML

## (undated) DEVICE — CAUTERY PAD-POLYHESIVE II ADULT

## (undated) DEVICE — FORCEP-COLON BIOPSY LARGE W/NEEDLE 240CM

## (undated) DEVICE — CANISTER-SUCTION 2000CC

## (undated) DEVICE — GLV-8.5 PROTEXIS PI CLASSIC LF/PF

## (undated) DEVICE — CONNECTOR-ERBEFLO 2 PORT

## (undated) DEVICE — LUBRICANT JELLY 2OZ. TUBE

## (undated) DEVICE — SCD SLEEVE-KNEE REG.

## (undated) DEVICE — SENSOR-OXISENSOR II ADULT

## (undated) DEVICE — TOPICAL SKIN ADHESIVE EXOFIN

## (undated) DEVICE — IRRIGATION-H2O 1000ML

## (undated) DEVICE — SUTURE-VICRYL 3-0 SH J416H

## (undated) DEVICE — STERI-STRIP-1/2" X 4"

## (undated) DEVICE — BLADE-SCALPEL #15

## (undated) DEVICE — TUBING SUCTION 20FT N620A

## (undated) DEVICE — CAUTERY-MICROFINE NEEDLE

## (undated) DEVICE — TUBING-SUCTION 20FT

## (undated) DEVICE — SUTURE-VICRYL 0 CT-1 J946H

## (undated) DEVICE — LABEL-STERILE PREPRINTED FOR OR

## (undated) DEVICE — APPLICATOR-CHLORAPREP 26ML TINTED CHG 2%+ 70% IPA-SURGICAL

## (undated) DEVICE — STAPLER-SKIN 35 WIDE STAPLES

## (undated) DEVICE — DRSG-MEPILEX BORDER AG 3" X 3" (7.5CM X 7.5CM)

## (undated) DEVICE — STERILE SLEEVE

## (undated) DEVICE — LIGHT HANDLE COVER FOR SKYTRON LIGHTS

## (undated) DEVICE — DRAPE-U DRAPE SPLIT SHEET 72" X 122"

## (undated) DEVICE — PACK-LAPAROTOMY-CUSTOM

## (undated) DEVICE — SWABSTICK-BENZOIN

## (undated) DEVICE — PACK-BASIN SET-UP

## (undated) DEVICE — DRAPE-THREE QUARTER (LARGE) SHEET

## (undated) DEVICE — SUTURE-MONOCRYL 4-0 PS-2 Y496G

## (undated) DEVICE — TRAY-SKIN PREP POVIDONE/IODINE

## (undated) DEVICE — SOL WATER IRRIG 1000ML BOTTLE 2F7114

## (undated) DEVICE — SYRINGE-ASEPTO IRRIGATION

## (undated) DEVICE — BIN-COVIDIEN MESH BIN

## (undated) DEVICE — CONNECTOR ERBEFLO 2 PORT 20325-215

## (undated) DEVICE — SUTURE-PROLENE 3-0 PS-1 8663G

## (undated) RX ORDER — FENTANYL CITRATE 50 UG/ML
INJECTION, SOLUTION INTRAMUSCULAR; INTRAVENOUS
Status: DISPENSED
Start: 2022-02-14

## (undated) RX ORDER — FENTANYL CITRATE 50 UG/ML
INJECTION, SOLUTION INTRAMUSCULAR; INTRAVENOUS
Status: DISPENSED
Start: 2022-03-07

## (undated) RX ORDER — LIDOCAINE HYDROCHLORIDE 20 MG/ML
INJECTION, SOLUTION EPIDURAL; INFILTRATION; INTRACAUDAL; PERINEURAL
Status: DISPENSED
Start: 2022-03-07

## (undated) RX ORDER — PROPOFOL 10 MG/ML
INJECTION, EMULSION INTRAVENOUS
Status: DISPENSED
Start: 2022-03-07

## (undated) RX ORDER — PROPOFOL 10 MG/ML
INJECTION, EMULSION INTRAVENOUS
Status: DISPENSED
Start: 2022-02-14

## (undated) RX ORDER — DEXAMETHASONE SODIUM PHOSPHATE 10 MG/ML
INJECTION, SOLUTION INTRAMUSCULAR; INTRAVENOUS
Status: DISPENSED
Start: 2022-02-14

## (undated) RX ORDER — LIDOCAINE HYDROCHLORIDE 20 MG/ML
INJECTION, SOLUTION EPIDURAL; INFILTRATION; INTRACAUDAL; PERINEURAL
Status: DISPENSED
Start: 2022-02-14

## (undated) RX ORDER — ONDANSETRON 2 MG/ML
INJECTION INTRAMUSCULAR; INTRAVENOUS
Status: DISPENSED
Start: 2022-02-14

## (undated) RX ORDER — PROPOFOL 10 MG/ML
INJECTION, EMULSION INTRAVENOUS
Status: DISPENSED
Start: 2019-05-23

## (undated) RX ORDER — PROPOFOL 10 MG/ML
INJECTION, EMULSION INTRAVENOUS
Status: DISPENSED
Start: 2024-09-20

## (undated) RX ORDER — LIDOCAINE HYDROCHLORIDE 20 MG/ML
INJECTION, SOLUTION EPIDURAL; INFILTRATION; INTRACAUDAL; PERINEURAL
Status: DISPENSED
Start: 2019-05-23